# Patient Record
Sex: FEMALE | Race: WHITE | Employment: OTHER | URBAN - METROPOLITAN AREA
[De-identification: names, ages, dates, MRNs, and addresses within clinical notes are randomized per-mention and may not be internally consistent; named-entity substitution may affect disease eponyms.]

---

## 2017-01-13 ENCOUNTER — OFFICE VISIT (OUTPATIENT)
Dept: INTERNAL MEDICINE CLINIC | Age: 52
End: 2017-01-13

## 2017-01-13 VITALS
DIASTOLIC BLOOD PRESSURE: 80 MMHG | HEART RATE: 75 BPM | WEIGHT: 212.4 LBS | SYSTOLIC BLOOD PRESSURE: 122 MMHG | OXYGEN SATURATION: 98 % | BODY MASS INDEX: 33.27 KG/M2

## 2017-01-13 DIAGNOSIS — Z72.0 TOBACCO ABUSE: ICD-10-CM

## 2017-01-13 DIAGNOSIS — Z00.00 ROUTINE GENERAL MEDICAL EXAMINATION AT A HEALTH CARE FACILITY: Primary | ICD-10-CM

## 2017-01-13 LAB
ANION GAP SERPL CALCULATED.3IONS-SCNC: 11 MMOL/L (ref 3–16)
BASOPHILS ABSOLUTE: 0 K/UL (ref 0–0.2)
BASOPHILS RELATIVE PERCENT: 0.6 %
BUN BLDV-MCNC: 18 MG/DL (ref 7–20)
CALCIUM SERPL-MCNC: 9.2 MG/DL (ref 8.3–10.6)
CHLORIDE BLD-SCNC: 104 MMOL/L (ref 99–110)
CHOLESTEROL, TOTAL: 213 MG/DL (ref 0–199)
CO2: 27 MMOL/L (ref 21–32)
CREAT SERPL-MCNC: 0.7 MG/DL (ref 0.6–1.1)
EOSINOPHILS ABSOLUTE: 0.2 K/UL (ref 0–0.6)
EOSINOPHILS RELATIVE PERCENT: 2.6 %
GFR AFRICAN AMERICAN: >60
GFR NON-AFRICAN AMERICAN: >60
GLUCOSE BLD-MCNC: 99 MG/DL (ref 70–99)
HCT VFR BLD CALC: 46.4 % (ref 36–48)
HDLC SERPL-MCNC: 46 MG/DL (ref 40–60)
HEMOGLOBIN: 15.5 G/DL (ref 12–16)
LDL CHOLESTEROL CALCULATED: 134 MG/DL
LYMPHOCYTES ABSOLUTE: 2 K/UL (ref 1–5.1)
LYMPHOCYTES RELATIVE PERCENT: 31.3 %
MCH RBC QN AUTO: 31.7 PG (ref 26–34)
MCHC RBC AUTO-ENTMCNC: 33.5 G/DL (ref 31–36)
MCV RBC AUTO: 94.6 FL (ref 80–100)
MONOCYTES ABSOLUTE: 0.4 K/UL (ref 0–1.3)
MONOCYTES RELATIVE PERCENT: 6.6 %
NEUTROPHILS ABSOLUTE: 3.8 K/UL (ref 1.7–7.7)
NEUTROPHILS RELATIVE PERCENT: 58.9 %
PDW BLD-RTO: 13.3 % (ref 12.4–15.4)
PLATELET # BLD: 282 K/UL (ref 135–450)
PMV BLD AUTO: 9.1 FL (ref 5–10.5)
POTASSIUM SERPL-SCNC: 4.4 MMOL/L (ref 3.5–5.1)
RBC # BLD: 4.91 M/UL (ref 4–5.2)
SODIUM BLD-SCNC: 142 MMOL/L (ref 136–145)
TRIGL SERPL-MCNC: 166 MG/DL (ref 0–150)
TSH REFLEX: 0.63 UIU/ML (ref 0.27–4.2)
VITAMIN D 25-HYDROXY: 31.3 NG/ML
VLDLC SERPL CALC-MCNC: 33 MG/DL
WBC # BLD: 6.5 K/UL (ref 4–11)

## 2017-01-13 PROCEDURE — 99386 PREV VISIT NEW AGE 40-64: CPT | Performed by: NURSE PRACTITIONER

## 2017-01-13 RX ORDER — ESTRADIOL 0.1 MG/D
1 FILM, EXTENDED RELEASE TRANSDERMAL
COMMUNITY
End: 2018-10-11

## 2017-01-13 RX ORDER — AMOXICILLIN AND CLAVULANATE POTASSIUM 875; 125 MG/1; MG/1
1 TABLET, FILM COATED ORAL 2 TIMES DAILY
COMMUNITY
End: 2018-10-11

## 2017-01-13 RX ORDER — BENZONATATE 200 MG/1
200 CAPSULE ORAL 3 TIMES DAILY PRN
COMMUNITY
End: 2018-10-11

## 2017-01-13 RX ORDER — VARENICLINE TARTRATE 25 MG
KIT ORAL
Qty: 1 EACH | Refills: 0 | Status: SHIPPED | OUTPATIENT
Start: 2017-01-13 | End: 2018-10-11

## 2018-01-30 ENCOUNTER — HOSPITAL ENCOUNTER (OUTPATIENT)
Dept: ULTRASOUND IMAGING | Age: 53
Discharge: HOME | End: 2018-01-30
Attending: INTERNAL MEDICINE | Admitting: INTERNAL MEDICINE

## 2018-01-30 ENCOUNTER — OFFICE VISIT (OUTPATIENT)
Dept: INTERNAL MEDICINE CLINIC | Age: 53
End: 2018-01-30

## 2018-01-30 VITALS
TEMPERATURE: 98.3 F | WEIGHT: 215.6 LBS | HEART RATE: 72 BPM | BODY MASS INDEX: 33.77 KG/M2 | DIASTOLIC BLOOD PRESSURE: 76 MMHG | SYSTOLIC BLOOD PRESSURE: 138 MMHG

## 2018-01-30 DIAGNOSIS — M54.9 DORSALGIA: ICD-10-CM

## 2018-01-30 DIAGNOSIS — M54.9 OTHER ACUTE BACK PAIN: ICD-10-CM

## 2018-01-30 DIAGNOSIS — M54.9 OTHER ACUTE BACK PAIN: Primary | ICD-10-CM

## 2018-01-30 LAB
BILIRUBIN URINE: NEGATIVE
BILIRUBIN, POC: ABNORMAL
BLOOD URINE, POC: ABNORMAL
BLOOD, URINE: NEGATIVE
CLARITY, POC: CLEAR
CLARITY: CLEAR
COLOR, POC: YELLOW
COLOR: YELLOW
EPITHELIAL CELLS, UA: 6 /HPF (ref 0–5)
GLUCOSE URINE, POC: ABNORMAL
GLUCOSE URINE: NEGATIVE MG/DL
HYALINE CASTS: 5 /LPF (ref 0–8)
KETONES, POC: ABNORMAL
KETONES, URINE: NEGATIVE MG/DL
LEUKOCYTE EST, POC: ABNORMAL
LEUKOCYTE ESTERASE, URINE: NEGATIVE
MICROSCOPIC EXAMINATION: YES
NITRITE, POC: ABNORMAL
NITRITE, URINE: NEGATIVE
PH UA: 6
PH, POC: 6
PROTEIN UA: ABNORMAL MG/DL
PROTEIN, POC: ABNORMAL
RBC UA: 8 /HPF (ref 0–4)
SPECIFIC GRAVITY UA: >1.03
SPECIFIC GRAVITY, POC: >1.03
UROBILINOGEN, POC: 0.2
UROBILINOGEN, URINE: 0.2 E.U./DL
WBC UA: 1 /HPF (ref 0–5)

## 2018-01-30 PROCEDURE — G8419 CALC BMI OUT NRM PARAM NOF/U: HCPCS | Performed by: INTERNAL MEDICINE

## 2018-01-30 PROCEDURE — 3017F COLORECTAL CA SCREEN DOC REV: CPT | Performed by: INTERNAL MEDICINE

## 2018-01-30 PROCEDURE — 81001 URINALYSIS AUTO W/SCOPE: CPT | Performed by: INTERNAL MEDICINE

## 2018-01-30 PROCEDURE — G8482 FLU IMMUNIZE ORDER/ADMIN: HCPCS | Performed by: INTERNAL MEDICINE

## 2018-01-30 PROCEDURE — 3014F SCREEN MAMMO DOC REV: CPT | Performed by: INTERNAL MEDICINE

## 2018-01-30 PROCEDURE — 99213 OFFICE O/P EST LOW 20 MIN: CPT | Performed by: INTERNAL MEDICINE

## 2018-01-30 PROCEDURE — G8427 DOCREV CUR MEDS BY ELIG CLIN: HCPCS | Performed by: INTERNAL MEDICINE

## 2018-01-30 PROCEDURE — 4004F PT TOBACCO SCREEN RCVD TLK: CPT | Performed by: INTERNAL MEDICINE

## 2018-01-30 NOTE — PROGRESS NOTES
 Not on file     Social History Narrative    No narrative on file       Assessment/Plan:    1. Other acute back pain  - XR LUMBAR SPINE (2-3 VIEWS); Future  - URINALYSIS WITH MICROSCOPIC  UA POCT showed microscopic hematuria - rule out any urinary issues -US Renal Complete; Future  Continue pain medication PRN.

## 2018-01-31 ENCOUNTER — TELEPHONE (OUTPATIENT)
Dept: INTERNAL MEDICINE CLINIC | Age: 53
End: 2018-01-31

## 2018-01-31 DIAGNOSIS — R31.9 HEMATURIA, UNSPECIFIED TYPE: Primary | ICD-10-CM

## 2018-10-11 ENCOUNTER — OFFICE VISIT (OUTPATIENT)
Dept: PRIMARY CARE CLINIC | Age: 53
End: 2018-10-11

## 2018-10-11 VITALS
TEMPERATURE: 98.2 F | HEART RATE: 115 BPM | WEIGHT: 197 LBS | HEIGHT: 67 IN | SYSTOLIC BLOOD PRESSURE: 138 MMHG | BODY MASS INDEX: 30.92 KG/M2 | DIASTOLIC BLOOD PRESSURE: 70 MMHG

## 2018-10-11 DIAGNOSIS — T50.905A ADVERSE EFFECT OF DRUG, INITIAL ENCOUNTER: ICD-10-CM

## 2018-10-11 DIAGNOSIS — R00.2 PALPITATIONS: Primary | ICD-10-CM

## 2018-10-11 PROCEDURE — 99213 OFFICE O/P EST LOW 20 MIN: CPT | Performed by: INTERNAL MEDICINE

## 2018-10-11 PROCEDURE — 93000 ELECTROCARDIOGRAM COMPLETE: CPT | Performed by: INTERNAL MEDICINE

## 2018-10-11 RX ORDER — PHENTERMINE HYDROCHLORIDE 37.5 MG/1
37.5 CAPSULE ORAL 2 TIMES DAILY
COMMUNITY
End: 2019-04-25 | Stop reason: ALTCHOICE

## 2018-10-11 ASSESSMENT — ENCOUNTER SYMPTOMS
SHORTNESS OF BREATH: 1
WHEEZING: 0

## 2019-04-22 ENCOUNTER — TELEPHONE (OUTPATIENT)
Dept: PRIMARY CARE CLINIC | Age: 54
End: 2019-04-22

## 2019-04-22 NOTE — TELEPHONE ENCOUNTER
Pt is experiencing issues poss with her thyroids. Per pt stated that she is shaking all over. Pt was requesting an appt asap but scheduled the pt with the  first availability.             Thanks

## 2019-04-22 NOTE — TELEPHONE ENCOUNTER
She can be seen when there is availability. If symptoms are so severe, she can not wait until appt then I would suggest ER/urgent care.  At the very least prior to her appt, she can come in for LAB visit only for tsh w/ reflex

## 2019-04-25 ENCOUNTER — OFFICE VISIT (OUTPATIENT)
Dept: PRIMARY CARE CLINIC | Age: 54
End: 2019-04-25
Payer: COMMERCIAL

## 2019-04-25 VITALS
SYSTOLIC BLOOD PRESSURE: 130 MMHG | WEIGHT: 180.6 LBS | TEMPERATURE: 98.2 F | DIASTOLIC BLOOD PRESSURE: 60 MMHG | BODY MASS INDEX: 30.09 KG/M2 | HEART RATE: 88 BPM | HEIGHT: 65 IN

## 2019-04-25 DIAGNOSIS — L65.9 HAIR LOSS: ICD-10-CM

## 2019-04-25 DIAGNOSIS — R53.83 FATIGUE, UNSPECIFIED TYPE: Primary | ICD-10-CM

## 2019-04-25 DIAGNOSIS — E04.9 GOITER: ICD-10-CM

## 2019-04-25 LAB
ANION GAP SERPL CALCULATED.3IONS-SCNC: 12 MMOL/L (ref 3–16)
BUN BLDV-MCNC: 20 MG/DL (ref 7–20)
CALCIUM SERPL-MCNC: 9.4 MG/DL (ref 8.3–10.6)
CHLORIDE BLD-SCNC: 109 MMOL/L (ref 99–110)
CO2: 21 MMOL/L (ref 21–32)
CREAT SERPL-MCNC: <0.5 MG/DL (ref 0.6–1.1)
GFR AFRICAN AMERICAN: >60
GFR NON-AFRICAN AMERICAN: >60
GLUCOSE BLD-MCNC: 151 MG/DL (ref 70–99)
POTASSIUM SERPL-SCNC: 4.3 MMOL/L (ref 3.5–5.1)
SODIUM BLD-SCNC: 142 MMOL/L (ref 136–145)
T4 FREE: 5.7 NG/DL (ref 0.9–1.8)
TSH REFLEX: <0.01 UIU/ML (ref 0.27–4.2)

## 2019-04-25 PROCEDURE — 36415 COLL VENOUS BLD VENIPUNCTURE: CPT | Performed by: NURSE PRACTITIONER

## 2019-04-25 PROCEDURE — 3017F COLORECTAL CA SCREEN DOC REV: CPT | Performed by: NURSE PRACTITIONER

## 2019-04-25 PROCEDURE — 99213 OFFICE O/P EST LOW 20 MIN: CPT | Performed by: NURSE PRACTITIONER

## 2019-04-25 PROCEDURE — G8417 CALC BMI ABV UP PARAM F/U: HCPCS | Performed by: NURSE PRACTITIONER

## 2019-04-25 PROCEDURE — G8427 DOCREV CUR MEDS BY ELIG CLIN: HCPCS | Performed by: NURSE PRACTITIONER

## 2019-04-25 PROCEDURE — 1036F TOBACCO NON-USER: CPT | Performed by: NURSE PRACTITIONER

## 2019-04-25 ASSESSMENT — PATIENT HEALTH QUESTIONNAIRE - PHQ9
SUM OF ALL RESPONSES TO PHQ QUESTIONS 1-9: 1
1. LITTLE INTEREST OR PLEASURE IN DOING THINGS: 1
2. FEELING DOWN, DEPRESSED OR HOPELESS: 0
SUM OF ALL RESPONSES TO PHQ9 QUESTIONS 1 & 2: 1
SUM OF ALL RESPONSES TO PHQ QUESTIONS 1-9: 1

## 2019-04-25 NOTE — PROGRESS NOTES
Subjective:      Patient ID: Tiffany Baker is a 46 y.o. female. HPI 45 yo female presents for swollen lump to anterior neck. Has been there for a while, first noticed around dexter time. Cant sleep at night, heart feels like it is racing. Feels fine out the outside, but not inside. Reports tremors, cramps in finger, restlessness, diarrhea. Symptoms present > 1 month. Unable to sleep, fatigue, night sweats, feels shaky    No longer smoking, over 90 days. Went cold turkey    Patient past medical history, family history, social, and smoking reviewed and updated as pertinent. Health maintenance has been reviewed. Prior to Visit Medications    Not on File         Review of Systems   Constitutional: Positive for fatigue. Cardiovascular: Positive for palpitations. Endocrine:        Night sweats     Neurological: Positive for tremors. All other systems reviewed and are negative. Objective:   Physical Exam   Constitutional: She is oriented to person, place, and time. She appears well-developed and well-nourished. Neck: Thyromegaly present. Cardiovascular: Normal rate, regular rhythm and normal heart sounds. Pulmonary/Chest: Effort normal and breath sounds normal.   Neurological: She is alert and oriented to person, place, and time. Skin: Skin is warm and dry. Assessment:       Diagnosis Orders   1. Fatigue, unspecified type  TSH with Reflex    Basic Metabolic Panel   2. Hair loss  TSH with Reflex    Basic Metabolic Panel   3.  Goiter  US Head Neck Soft Tissue Thyroid    TSH with Reflex    Basic Metabolic Panel           Plan:      As above          Rosie Villa, APRN - CNP

## 2019-04-26 ENCOUNTER — HOSPITAL ENCOUNTER (OUTPATIENT)
Dept: ULTRASOUND IMAGING | Age: 54
Discharge: HOME | End: 2019-04-26
Payer: COMMERCIAL

## 2019-04-26 DIAGNOSIS — E04.9 GOITER: ICD-10-CM

## 2019-04-26 PROCEDURE — 76536 US EXAM OF HEAD AND NECK: CPT

## 2019-04-29 ENCOUNTER — TELEPHONE (OUTPATIENT)
Dept: PRIMARY CARE CLINIC | Age: 54
End: 2019-04-29

## 2019-04-29 DIAGNOSIS — E05.90 HYPERTHYROIDISM: Primary | ICD-10-CM

## 2019-04-29 NOTE — TELEPHONE ENCOUNTER
----- Message from MARY Travis CNP sent at 4/29/2019 10:14 AM EDT -----  Please let william know that her ultrasound confirms an enlarged thyroid. Her bloodwork is indicative of HYPERthyroidism.  I am going to refer her to endocrine for management    Please place referral to Dr. Manuel Danielle or partners    Dx: enlarged thyroid, hyperthyroidism

## 2019-05-21 PROBLEM — E66.9 CLASS 1 OBESITY WITH BODY MASS INDEX (BMI) OF 30.0 TO 30.9 IN ADULT: Status: ACTIVE | Noted: 2019-05-21

## 2019-05-21 PROBLEM — E05.90 HYPERTHYROIDISM: Status: ACTIVE | Noted: 2019-05-21

## 2019-05-21 PROBLEM — E04.1 THYROID NODULE: Status: ACTIVE | Noted: 2019-05-21

## 2019-05-21 PROBLEM — E04.9 GOITER: Status: ACTIVE | Noted: 2019-05-21

## 2019-05-21 PROBLEM — E66.811 CLASS 1 OBESITY WITH BODY MASS INDEX (BMI) OF 30.0 TO 30.9 IN ADULT: Status: ACTIVE | Noted: 2019-05-21

## 2019-05-22 ENCOUNTER — OFFICE VISIT (OUTPATIENT)
Dept: ENDOCRINOLOGY | Age: 54
End: 2019-05-22
Payer: COMMERCIAL

## 2019-05-22 VITALS
DIASTOLIC BLOOD PRESSURE: 67 MMHG | HEIGHT: 65 IN | OXYGEN SATURATION: 97 % | HEART RATE: 94 BPM | WEIGHT: 179.2 LBS | BODY MASS INDEX: 29.85 KG/M2 | SYSTOLIC BLOOD PRESSURE: 133 MMHG

## 2019-05-22 DIAGNOSIS — E66.3 OVERWEIGHT (BMI 25.0-29.9): ICD-10-CM

## 2019-05-22 DIAGNOSIS — E05.90 HYPERTHYROIDISM: ICD-10-CM

## 2019-05-22 DIAGNOSIS — E04.9 GOITER: ICD-10-CM

## 2019-05-22 DIAGNOSIS — E04.1 THYROID NODULE: ICD-10-CM

## 2019-05-22 PROCEDURE — G8417 CALC BMI ABV UP PARAM F/U: HCPCS | Performed by: INTERNAL MEDICINE

## 2019-05-22 PROCEDURE — 4004F PT TOBACCO SCREEN RCVD TLK: CPT | Performed by: INTERNAL MEDICINE

## 2019-05-22 PROCEDURE — G8427 DOCREV CUR MEDS BY ELIG CLIN: HCPCS | Performed by: INTERNAL MEDICINE

## 2019-05-22 PROCEDURE — 3017F COLORECTAL CA SCREEN DOC REV: CPT | Performed by: INTERNAL MEDICINE

## 2019-05-22 PROCEDURE — 99204 OFFICE O/P NEW MOD 45 MIN: CPT | Performed by: INTERNAL MEDICINE

## 2019-05-22 RX ORDER — CHOLECALCIFEROL (VITAMIN D3) 1250 MCG
CAPSULE ORAL
COMMUNITY
End: 2019-11-14

## 2019-05-22 RX ORDER — ATENOLOL 25 MG/1
25 TABLET ORAL DAILY
Qty: 60 TABLET | Refills: 3 | Status: SHIPPED | OUTPATIENT
Start: 2019-05-22 | End: 2019-06-21 | Stop reason: SDUPTHER

## 2019-05-22 NOTE — PROGRESS NOTES
SUBJECTIVE:  Josefa Mcardle is a 46 y.o. female who is here for hyperthyroidism. 1. Hyperthyroidism    This started in 2019. Patient was diagnosed with hyperthyroidism. The problem has been gradually worsening. Previous thyroid studies include: TSH and free thyroxine. Patient started medication in N/A. Currently patient is on: N/A. Misses  N/A doses a month. 2. Goiter    Current complaints: palpitations, weight loss, diarrhea, heat intolerance, goiter, hair thinning, sweating, insomnia, decreased concentration, anxiety, tremor. History of obstructive symptoms: difficulty swallowing Yes, changes in voice/hoarseness Yes. History of radiation to patient's neck: No  Resent iodine exposure: No  Family history includes unknown  Family history of thyroid cancer: unknown    Noticed right neck swelling 1 year ago, increased in size. Feels like sore throat. 3. Thyroid nodule  Small, 6 mm. 4. Overweight  Lost 30 lbs over 5 months on diet, exercise. THYROID ULTRASOUND       CLINICAL HISTORY: Goiter. Thyromegaly.        FINDINGS:  Ultrasound imaging of the thyroid gland was performed. The right left lobes of the thyroid gland are diffusely enlarged. The right lobe of the thyroid gland measures 6.1 x 2.8 x 2.4 cm. Left lobe of the thyroid gland measures 5.4 x 2.2 x 2.1    cm in size. Echotexture of the gland is mildly heterogeneous with some areas of lobulation. There is a small mixed echogenic nodule identified medially within the inferior pole of the right lobe of the thyroid measuring 0.6 x 0.5 x 0.4 cm.       There is increased Doppler flow identified within the thyroid gland.           Impression           1. Diffusely enlarged thyroid gland with hyperemia on Doppler flow. Thyromegaly is nonspecific but can be seen with Graves' disease, or Hashimoto's thyroiditis. Correlate with thyroid function tests.    2. Small thyroid nodule is identified at the inferior pole the right lobe of the thyroid gland. Recommend sonographic follow-up in one year to confirm stability.       Order History         History reviewed. No pertinent past medical history.   Patient Active Problem List    Diagnosis Date Noted    Hyperthyroidism 2019    Goiter 2019    Thyroid nodule 2019    Overweight (BMI 25.0-29.9) 2019    Acute back pain 2018     Past Surgical History:   Procedure Laterality Date    CHOLECYSTECTOMY      HYSTERECTOMY      TUBAL LIGATION       Family History   Adopted: Yes     Social History     Socioeconomic History    Marital status:      Spouse name: None    Number of children: None    Years of education: None    Highest education level: None   Occupational History    None   Social Needs    Financial resource strain: None    Food insecurity:     Worry: None     Inability: None    Transportation needs:     Medical: None     Non-medical: None   Tobacco Use    Smoking status: Current Every Day Smoker     Packs/day: 0.25     Years: 25.00     Pack years: 6.25     Types: Cigarettes     Last attempt to quit: 2019     Years since quittin.3    Smokeless tobacco: Never Used   Substance and Sexual Activity    Alcohol use: Yes     Comment: socially    Drug use: No    Sexual activity: Yes     Partners: Male   Lifestyle    Physical activity:     Days per week: None     Minutes per session: None    Stress: None   Relationships    Social connections:     Talks on phone: None     Gets together: None     Attends Gnosticist service: None     Active member of club or organization: None     Attends meetings of clubs or organizations: None     Relationship status: None    Intimate partner violence:     Fear of current or ex partner: None     Emotionally abused: None     Physically abused: None     Forced sexual activity: None   Other Topics Concern    None   Social History Narrative    None     Current Outpatient Medications   Medication Sig Dispense Refill    Cholecalciferol (VITAMIN D3) 30976 units CAPS Take by mouth      UNABLE TO FIND Hormone Pellets      atenolol (TENORMIN) 25 MG tablet Take 1 tablet by mouth daily 60 tablet 3     No current facility-administered medications for this visit. No Known Allergies  No family status information on file.        Review of Systems:  Constitutional: has fatigue, no fever, no recent weight gain, has recent weight loss, no changes in appetite  Eyes: no eye pain, no change in vision, no eye redness, no eye irritation, no double vision  Ears, nose, throat: no nasal congestion, no sore throat, no earache, no decrease in hearing, hsa hoarseness, no dry mouth, no sinus problems, has difficulty swallowing, no neck lumps, no dental problems, no mouth sores, no ringing in ears  Pulmonary: has shortness of breath, no wheezing, no dyspnea on exertion, has cough  Cardiovascular: has chest pain, has lower extremity edema, no orthopnea, no intermittent leg claudication, has palpitations  Gastrointestinal: no abdominal pain, no nausea, no vomiting, has diarrhea, no constipation, no dysphagia, no heartburn, no bloating  Genitourinary: no dysuria, no urinary incontinence, no urinary hesitancy, no urinary frequency, no feelings of urinary urgency, has nocturia  Musculoskeletal: no joint swelling, no joint stiffness, no joint pain, no muscle cramps, has muscle pain, no bone pain  Integument/Breast: no hair loss, no skin rashes, no skin lesions, no itching, no dry skin  Neurological: no numbness, no tingling, no weakness, no confusion, has headaches, has dizziness, no fainting, no tremors, no decrease in memory, no balance problems  Psychiatric: has anxiety, no depression, no insomnia  Hematologic/Lymphatic: no tendency for easy bleeding, no swollen lymph nodes, no tendency for easy bruising  Immunology: no seasonal allergies, no frequent infections, no frequent illnesses  Endocrine: no temperature intolerance, has hand tremor    /67 CO2 21 04/25/2019    BUN 20 04/25/2019    CREATININE <0.5 04/25/2019    GLUCOSE 151 04/25/2019    CALCIUM 9.4 04/25/2019    PROT 7.1 02/21/2018    LABALBU 3.8 02/21/2018    BILITOT <0.2 02/21/2018    ALKPHOS 89 02/21/2018    AST 17 02/21/2018    ALT 19 02/21/2018    LABGLOM >60 04/25/2019    GFRAA >60 04/25/2019     No results found for: TSHFT4, TSH, FT3  No results found for: LABA1C  No results found for: EAG  Lab Results   Component Value Date    CHOL 213 01/13/2017     Lab Results   Component Value Date    TRIG 166 01/13/2017     Lab Results   Component Value Date    HDL 46 01/13/2017     Lab Results   Component Value Date    LDLCALC 134 01/13/2017     Lab Results   Component Value Date    LABVLDL 33 01/13/2017     No results found for: [facility]  Lab Results   Component Value Date    LABMICR Yes 02/21/2018     Lab Results   Component Value Date    VITD25 31.3 01/13/2017        ASSESSMENT/PLAN:  1. Hyperthyroidism  - Thyroid uptake and scan  - atenolol (TENORMIN) 25 MG tablet; Take 1 tablet by mouth daily  Dispense: 60 tablet; Refill: 3  - T3; Future  - T3, Free; Future  - T4; Future  - T4, Free; Future  - CBC Auto Differential; Future  - Comprehensive Metabolic Panel; Future  - Thyrotropin receptor antibody; Future  - Thyroid Stimulating Immunoglobulin; Future  - Thyroid Peroxidase Antibody; Future  - TSH without Reflex; Future  - Anti-Thyroglobulin Antibody; Future    2. Goiter    - T3; Future  - T3, Free; Future  - T4; Future  - T4, Free; Future  - CBC Auto Differential; Future  - Comprehensive Metabolic Panel; Future  - Thyrotropin receptor antibody; Future  - Thyroid Stimulating Immunoglobulin; Future  - Thyroid Peroxidase Antibody; Future  - TSH without Reflex; Future  - Anti-Thyroglobulin Antibody; Future    3. Thyroid nodule  Thyroid sono follow up. 4. Overweight (BMI 25.0-29. 9)  Diet, exercise.     Reviewed and/or ordered clinical lab results Yes  Reviewed and/or ordered radiology tests Yes Reviewed and/or ordered other diagnostic tests No  Discussed test results with performing physician No  Independently reviewed image, tracing, or specimen No  Made a decision to obtain old records No  Reviewed and summarized old records Yes  TSH less than 0.01  FT4 5.7  TSH 0.63 in 2017  Obtained history from other than patient No    Masha Pichardo was counseled regarding symptoms of thyroid diagnosis, course and complications of disease if inadequately treated, side effects of medications, diagnosis, treatment options, and prognosis, risks, benefits, complications, and alternatives of treatment, labs, imaging and other studies and treatment targets and goals, I-131 Tx, methimazole, side effects, remission, permanent hypothyroidism. She understands instructions and counseling. Return in about 2 weeks (around 6/5/2019) for thyroid problems.

## 2019-05-23 ENCOUNTER — TELEPHONE (OUTPATIENT)
Dept: ENDOCRINOLOGY | Age: 54
End: 2019-05-23

## 2019-05-23 DIAGNOSIS — E05.90 HYPERTHYROIDISM: Primary | ICD-10-CM

## 2019-05-30 ENCOUNTER — HOSPITAL ENCOUNTER (OUTPATIENT)
Dept: NUCLEAR MEDICINE | Age: 54
Discharge: HOME | End: 2019-05-30
Payer: COMMERCIAL

## 2019-05-30 DIAGNOSIS — E05.90 HYPERTHYROIDISM: ICD-10-CM

## 2019-05-30 PROCEDURE — 78014 THYROID IMAGING W/BLOOD FLOW: CPT

## 2019-05-30 PROCEDURE — A9516 IODINE I-123 SOD IODIDE MIC: HCPCS | Performed by: INTERNAL MEDICINE

## 2019-05-30 PROCEDURE — 3430000000 HC RX DIAGNOSTIC RADIOPHARMACEUTICAL: Performed by: INTERNAL MEDICINE

## 2019-05-30 RX ADMIN — SODIUM IODIDE I 123 200 MICRO CURIE: 100 CAPSULE, GELATIN COATED ORAL at 15:09

## 2019-05-31 ENCOUNTER — HOSPITAL ENCOUNTER (OUTPATIENT)
Dept: NUCLEAR MEDICINE | Age: 54
Discharge: HOME | End: 2019-05-31
Payer: COMMERCIAL

## 2019-05-31 DIAGNOSIS — E05.90 HYPERTHYROIDISM: ICD-10-CM

## 2019-05-31 PROCEDURE — 78014 THYROID IMAGING W/BLOOD FLOW: CPT

## 2019-06-12 ENCOUNTER — OFFICE VISIT (OUTPATIENT)
Dept: ENDOCRINOLOGY | Age: 54
End: 2019-06-12
Payer: COMMERCIAL

## 2019-06-12 VITALS
OXYGEN SATURATION: 98 % | WEIGHT: 175.4 LBS | BODY MASS INDEX: 29.22 KG/M2 | DIASTOLIC BLOOD PRESSURE: 79 MMHG | SYSTOLIC BLOOD PRESSURE: 133 MMHG | HEIGHT: 65 IN | HEART RATE: 92 BPM

## 2019-06-12 DIAGNOSIS — E04.1 THYROID NODULE: ICD-10-CM

## 2019-06-12 DIAGNOSIS — E66.3 OVERWEIGHT (BMI 25.0-29.9): ICD-10-CM

## 2019-06-12 DIAGNOSIS — Z78.0 MENOPAUSE: ICD-10-CM

## 2019-06-12 DIAGNOSIS — E05.90 HYPERTHYROIDISM: Primary | ICD-10-CM

## 2019-06-12 DIAGNOSIS — E05.90 HYPERTHYROIDISM: ICD-10-CM

## 2019-06-12 DIAGNOSIS — E04.9 GOITER: ICD-10-CM

## 2019-06-12 LAB
A/G RATIO: 1.6 (ref 1.1–2.2)
ALBUMIN SERPL-MCNC: 3.9 G/DL (ref 3.4–5)
ALP BLD-CCNC: 139 U/L (ref 40–129)
ALT SERPL-CCNC: 29 U/L (ref 10–40)
ANION GAP SERPL CALCULATED.3IONS-SCNC: 12 MMOL/L (ref 3–16)
ANTI-THYROGLOB ABS: 211 IU/ML
AST SERPL-CCNC: 19 U/L (ref 15–37)
BILIRUB SERPL-MCNC: 0.3 MG/DL (ref 0–1)
BUN BLDV-MCNC: 19 MG/DL (ref 7–20)
CALCIUM SERPL-MCNC: 9.6 MG/DL (ref 8.3–10.6)
CHLORIDE BLD-SCNC: 106 MMOL/L (ref 99–110)
CO2: 24 MMOL/L (ref 21–32)
CREAT SERPL-MCNC: <0.5 MG/DL (ref 0.6–1.1)
GFR AFRICAN AMERICAN: >60
GFR NON-AFRICAN AMERICAN: >60
GLOBULIN: 2.5 G/DL
GLUCOSE BLD-MCNC: 104 MG/DL (ref 70–99)
POTASSIUM SERPL-SCNC: 5.1 MMOL/L (ref 3.5–5.1)
SODIUM BLD-SCNC: 142 MMOL/L (ref 136–145)
T3 FREE: 26 PG/ML (ref 2.3–4.2)
T3 TOTAL: 6 NG/ML (ref 0.8–2)
T4 FREE: 6.5 NG/DL (ref 0.9–1.8)
T4 TOTAL: 16.4 UG/DL (ref 4.5–10.9)
THYROID PEROXIDASE (TPO) ABS: 124 IU/ML
TOTAL PROTEIN: 6.4 G/DL (ref 6.4–8.2)

## 2019-06-12 PROCEDURE — 1036F TOBACCO NON-USER: CPT | Performed by: INTERNAL MEDICINE

## 2019-06-12 PROCEDURE — 3017F COLORECTAL CA SCREEN DOC REV: CPT | Performed by: INTERNAL MEDICINE

## 2019-06-12 PROCEDURE — G8417 CALC BMI ABV UP PARAM F/U: HCPCS | Performed by: INTERNAL MEDICINE

## 2019-06-12 PROCEDURE — 99214 OFFICE O/P EST MOD 30 MIN: CPT | Performed by: INTERNAL MEDICINE

## 2019-06-12 PROCEDURE — G8427 DOCREV CUR MEDS BY ELIG CLIN: HCPCS | Performed by: INTERNAL MEDICINE

## 2019-06-12 NOTE — PROGRESS NOTES
SUBJECTIVE:  Lissa Betancourt is a 46 y.o. female who is here for hyperthyroidism. 1. Hyperthyroidism    This started in 2019. Patient was diagnosed with hyperthyroidism. The problem has been gradually worsening. Previous thyroid studies include: TSH and free thyroxine. Patient started medication in N/A. Currently patient is on: N/A. Misses  N/A doses a month. 2. Goiter    Current complaints: palpitations, weight loss, diarrhea, heat intolerance, goiter, hair thinning, sweating, insomnia, decreased concentration, anxiety, tremor. Insomnia is better. History of obstructive symptoms: difficulty swallowing Yes, changes in voice/hoarseness Yes. History of radiation to patient's neck: No  Resent iodine exposure: No  Family history includes unknown  Family history of thyroid cancer: unknown    Noticed right neck swelling 1 year ago, increased in size. Feels like sore throat. 3. Thyroid nodule  Small, 6 mm. 4. Overweight  Lost 30 lbs over 5 months on diet, exercise. Thyroid uptake and scan.       HISTORY: Hyperthyroidism       COMPARISON: Thyroid ultrasound April 26, 2019.       228.4 uCi of I-123 was administered by mouth followed by thyroid uptake and scan       Homogeneous activity within the right and left lobes of the thyroid without hot or cold nodule. The right lobe is larger than the left incidental.       The 6 mm nodule noted inferior pole right lobe on previous ultrasound examination is beyond the spatial resolution of the thyroid scan.       Uptake at 24 hours is 73% which is markedly elevated with normal between 10 and 35%.         Impression       Markedly increased uptake at 73.6% indeterminate though likely relates to diffuse toxic goiter/Graves' disease         THYROID ULTRASOUND       CLINICAL HISTORY: Goiter. Thyromegaly.        FINDINGS:  Ultrasound imaging of the thyroid gland was performed. The right left lobes of the thyroid gland are diffusely enlarged.  The right lobe of the thyroid gland measures 6.1 x 2.8 x 2.4 cm. Left lobe of the thyroid gland measures 5.4 x 2.2 x 2.1    cm in size. Echotexture of the gland is mildly heterogeneous with some areas of lobulation. There is a small mixed echogenic nodule identified medially within the inferior pole of the right lobe of the thyroid measuring 0.6 x 0.5 x 0.4 cm.       There is increased Doppler flow identified within the thyroid gland.           Impression           1. Diffusely enlarged thyroid gland with hyperemia on Doppler flow. Thyromegaly is nonspecific but can be seen with Graves' disease, or Hashimoto's thyroiditis. Correlate with thyroid function tests. 2. Small thyroid nodule is identified at the inferior pole the right lobe of the thyroid gland. Recommend sonographic follow-up in one year to confirm stability.       Order History         History reviewed. No pertinent past medical history. Patient Active Problem List    Diagnosis Date Noted    Hyperthyroidism 2019    Goiter 2019    Thyroid nodule 2019    Overweight (BMI 25.0-29.9) 2019    Acute back pain 2018     Past Surgical History:   Procedure Laterality Date    CHOLECYSTECTOMY      HYSTERECTOMY      TUBAL LIGATION       Family History   Adopted: Yes     Social History     Socioeconomic History    Marital status:      Spouse name: None    Number of children: None    Years of education: None    Highest education level: None   Occupational History    None   Social Needs    Financial resource strain: None    Food insecurity:     Worry: None     Inability: None    Transportation needs:     Medical: None     Non-medical: None   Tobacco Use    Smoking status: Former Smoker     Packs/day: 0.25     Years: 25.00     Pack years: 6.25     Types: Cigarettes     Last attempt to quit: 2019     Years since quittin.3    Smokeless tobacco: Never Used   Substance and Sexual Activity    Alcohol use:  Yes Comment: socially    Drug use: No    Sexual activity: Yes     Partners: Male   Lifestyle    Physical activity:     Days per week: None     Minutes per session: None    Stress: None   Relationships    Social connections:     Talks on phone: None     Gets together: None     Attends Congregational service: None     Active member of club or organization: None     Attends meetings of clubs or organizations: None     Relationship status: None    Intimate partner violence:     Fear of current or ex partner: None     Emotionally abused: None     Physically abused: None     Forced sexual activity: None   Other Topics Concern    None   Social History Narrative    None     Current Outpatient Medications   Medication Sig Dispense Refill    Cholecalciferol (VITAMIN D3) 65570 units CAPS Take by mouth      UNABLE TO FIND Hormone Pellets      atenolol (TENORMIN) 25 MG tablet Take 1 tablet by mouth daily 60 tablet 3     No current facility-administered medications for this visit. No Known Allergies  No family status information on file.        Review of Systems:  Constitutional: has fatigue, no fever, no recent weight gain, has recent weight loss, no changes in appetite  Eyes: no eye pain, no change in vision, no eye redness, no eye irritation, no double vision  Ears, nose, throat: no nasal congestion, no sore throat, no earache, no decrease in hearing, hsa hoarseness, no dry mouth, no sinus problems, has difficulty swallowing, no neck lumps, no dental problems, no mouth sores, no ringing in ears  Pulmonary: has shortness of breath, no wheezing, no dyspnea on exertion, has cough  Cardiovascular: has chest pain, has lower extremity edema, no orthopnea, no intermittent leg claudication, has palpitations  Gastrointestinal: no abdominal pain, no nausea, no vomiting, has diarrhea, no constipation, no dysphagia, no heartburn, no bloating  Genitourinary: no dysuria, no urinary incontinence, no urinary hesitancy, no urinary frequency, no feelings of urinary urgency, has nocturia  Musculoskeletal: no joint swelling, no joint stiffness, no joint pain, no muscle cramps, has muscle pain, no bone pain  Integument/Breast: no hair loss, no skin rashes, no skin lesions, no itching, no dry skin  Neurological: no numbness, no tingling, no weakness, no confusion, has headaches, has dizziness, no fainting, no tremors, no decrease in memory, no balance problems  Psychiatric: has anxiety, no depression, no insomnia  Hematologic/Lymphatic: no tendency for easy bleeding, no swollen lymph nodes, no tendency for easy bruising  Immunology: no seasonal allergies, no frequent infections, no frequent illnesses  Endocrine: no temperature intolerance, has hand tremor    /79 (Site: Left Upper Arm, Position: Sitting, Cuff Size: Medium Adult)   Pulse 92   Ht 5' 5\" (1.651 m)   Wt 175 lb 6.4 oz (79.6 kg)   SpO2 98%   BMI 29.19 kg/m²    Wt Readings from Last 3 Encounters:   06/12/19 175 lb 6.4 oz (79.6 kg)   05/22/19 179 lb 3.2 oz (81.3 kg)   04/25/19 180 lb 9.6 oz (81.9 kg)     Body mass index is 29.19 kg/m².     OBJECTIVE:  Constitutional: no acute distress, well appearing and well nourished  Psychiatric: oriented to person, place and time, judgement and insight and normal, recent and remote memory and intact and mood and affect are normal  Skin: skin and subcutaneous tissue is normal without mass, normal turgor  Head and Face: examination of head and face revealed no abnormalities  Eyes: no lid or conjunctival swelling, erythema or discharge, pupils are normal, equal, round, reactive to light  Ears/Nose: external inspection of ears and nose revealed no abnormalities, hearing is grossly normal  Oropharynx/Mouth/Face: lips, tongue and gums are normal with no lesions, the voice quality was normal  Neck: neck is supple and symmetric, with midline trachea and no masses, thyroid is enlarged, right lobe larger  Lymphatics: normal cervical lymph nodes, normal supraclavicular nodes  Pulmonary: no increased work of breathing or signs of respiratory distress, lungs are clear to auscultation  Cardiovascular: normal heart rate and rhythm, normal S1 and S2, no murmurs and pedal pulses and 2+ bilaterally, No edema  Abdomen: abdomen is soft, non-tender with no masses  Musculoskeletal: normal gait and station and exam of the digits and nails are normal  Neurological: normal coordination and normal general cortical function, has hand tremor      Lab Review:    Lab Results   Component Value Date    WBC 12.5 02/21/2018    HGB 15.1 02/21/2018    HCT 44.4 02/21/2018    MCV 93.5 02/21/2018     02/21/2018     Lab Results   Component Value Date     04/25/2019    K 4.3 04/25/2019     04/25/2019    CO2 21 04/25/2019    BUN 20 04/25/2019    CREATININE <0.5 04/25/2019    GLUCOSE 151 04/25/2019    CALCIUM 9.4 04/25/2019    PROT 7.1 02/21/2018    LABALBU 3.8 02/21/2018    BILITOT <0.2 02/21/2018    ALKPHOS 89 02/21/2018    AST 17 02/21/2018    ALT 19 02/21/2018    LABGLOM >60 04/25/2019    GFRAA >60 04/25/2019     No results found for: TSHFT4, TSH, FT3  No results found for: LABA1C  No results found for: EAG  Lab Results   Component Value Date    CHOL 213 01/13/2017     Lab Results   Component Value Date    TRIG 166 01/13/2017     Lab Results   Component Value Date    HDL 46 01/13/2017     Lab Results   Component Value Date    LDLCALC 134 01/13/2017     Lab Results   Component Value Date    LABVLDL 33 01/13/2017     No results found for: [facility]  Lab Results   Component Value Date    LABMICR Yes 02/21/2018     Lab Results   Component Value Date    VITD25 31.3 01/13/2017        ASSESSMENT/PLAN:  1. Hyperthyroidism  Call with results, start [address] if needed. I-131 Tx when stable. - DXA  - atenolol (TENORMIN) 25 MG tablet; Take 1 tablet by mouth daily  Dispense: 60 tablet;  Refill: 3  - T3; Future  - T3, Free; Future  - T4; Future  - T4, Free; Future  - CBC Auto Differential; Future  - Comprehensive Metabolic Panel; Future  - TSH without Reflex; Future    2. Goiter  Thyroid sono    3. Thyroid nodule  Thyroid sono follow up. 4. Overweight (BMI 25.0-29. 9)  Diet, exercise. Reviewed and/or ordered clinical lab results Yes  Reviewed and/or ordered radiology tests Yes   Reviewed and/or ordered other diagnostic tests No  Discussed test results with performing physician No  Independently reviewed image, tracing, or specimen No  Made a decision to obtain old records No  Reviewed and summarized old records Yes  TSH less than 0.01  FT4 5.7  TSH 0.63 in 2017  Obtained history from other than patient No    Yury Vásquez was counseled regarding symptoms of thyroid diagnosis, course and complications of disease if inadequately treated, side effects of medications, diagnosis, treatment options, and prognosis, risks, benefits, complications, and alternatives of treatment, labs, imaging and other studies and treatment targets and goals, I-131 Tx, methimazole, side effects, remission, permanent hypothyroidism. She understands instructions and counseling. Return in about 1 month (around 7/12/2019), or one and 2 months, for thyroid problems.

## 2019-06-14 LAB — TSH RECEPTOR AB: >40 IU/L

## 2019-06-17 LAB — THYROID STIMULATING IMMUNOGLOBULIN: 480 %

## 2019-06-21 ENCOUNTER — TELEPHONE (OUTPATIENT)
Dept: ENDOCRINOLOGY | Age: 54
End: 2019-06-21

## 2019-06-21 DIAGNOSIS — E05.90 HYPERTHYROIDISM: ICD-10-CM

## 2019-06-21 RX ORDER — METHIMAZOLE 10 MG/1
10 TABLET ORAL 3 TIMES DAILY
Qty: 90 TABLET | Refills: 3 | Status: SHIPPED | OUTPATIENT
Start: 2019-06-21 | End: 2019-10-30 | Stop reason: SINTOL

## 2019-06-21 RX ORDER — ATENOLOL 25 MG/1
25 TABLET ORAL DAILY
Qty: 60 TABLET | Refills: 3 | Status: SHIPPED | OUTPATIENT
Start: 2019-06-21 | End: 2019-11-13 | Stop reason: SDUPTHER

## 2019-06-21 NOTE — TELEPHONE ENCOUNTER
PT called states Dr. Chano Doherty left her a voicemail and she couldn't understand it, needs a call back to let her know what the doctor wants to tell her, [phone]

## 2019-06-21 NOTE — TELEPHONE ENCOUNTER
I spoke with patient, discussed side effects and benefits of methimazole. Informed how to take it. I sent prescription to pharmacy. I answered questions to patient satisfaction.

## 2019-06-21 NOTE — TELEPHONE ENCOUNTER
LOV: 6/12/19  NOV: 7/17/19    Medication: Atenolol 25 MG  Sig: Take 1 tablet by mouth daily  Route: Oral  Quantity: 60

## 2019-06-24 ENCOUNTER — TELEPHONE (OUTPATIENT)
Dept: ENDOCRINOLOGY | Age: 54
End: 2019-06-24

## 2019-06-24 NOTE — TELEPHONE ENCOUNTER
Spoke with patient and advised prescription was sent to [facility]. Patient stated that was fine.

## 2019-07-09 DIAGNOSIS — E05.90 HYPERTHYROIDISM: ICD-10-CM

## 2019-07-09 LAB
A/G RATIO: 1.8 (ref 1.1–2.2)
ALBUMIN SERPL-MCNC: 3.9 G/DL (ref 3.4–5)
ALP BLD-CCNC: 149 U/L (ref 40–129)
ALT SERPL-CCNC: 28 U/L (ref 10–40)
ANION GAP SERPL CALCULATED.3IONS-SCNC: 10 MMOL/L (ref 3–16)
AST SERPL-CCNC: 19 U/L (ref 15–37)
BASOPHILS ABSOLUTE: 0 K/UL (ref 0–0.2)
BASOPHILS RELATIVE PERCENT: 0.6 %
BILIRUB SERPL-MCNC: 0.5 MG/DL (ref 0–1)
BUN BLDV-MCNC: 15 MG/DL (ref 7–20)
CALCIUM SERPL-MCNC: 9.6 MG/DL (ref 8.3–10.6)
CHLORIDE BLD-SCNC: 107 MMOL/L (ref 99–110)
CO2: 24 MMOL/L (ref 21–32)
CREAT SERPL-MCNC: <0.5 MG/DL (ref 0.6–1.1)
EOSINOPHILS ABSOLUTE: 0.3 K/UL (ref 0–0.6)
EOSINOPHILS RELATIVE PERCENT: 6.2 %
GFR AFRICAN AMERICAN: >60
GFR NON-AFRICAN AMERICAN: >60
GLOBULIN: 2.2 G/DL
GLUCOSE BLD-MCNC: 117 MG/DL (ref 70–99)
HCT VFR BLD CALC: 40.8 % (ref 36–48)
HEMOGLOBIN: 13.7 G/DL (ref 12–16)
LYMPHOCYTES ABSOLUTE: 1.6 K/UL (ref 1–5.1)
LYMPHOCYTES RELATIVE PERCENT: 34.8 %
MCH RBC QN AUTO: 28.4 PG (ref 26–34)
MCHC RBC AUTO-ENTMCNC: 33.5 G/DL (ref 31–36)
MCV RBC AUTO: 84.6 FL (ref 80–100)
MONOCYTES ABSOLUTE: 0.5 K/UL (ref 0–1.3)
MONOCYTES RELATIVE PERCENT: 11.2 %
NEUTROPHILS ABSOLUTE: 2.2 K/UL (ref 1.7–7.7)
NEUTROPHILS RELATIVE PERCENT: 47.2 %
PDW BLD-RTO: 13.6 % (ref 12.4–15.4)
PLATELET # BLD: 251 K/UL (ref 135–450)
PMV BLD AUTO: 9.6 FL (ref 5–10.5)
POTASSIUM SERPL-SCNC: 4.9 MMOL/L (ref 3.5–5.1)
RBC # BLD: 4.82 M/UL (ref 4–5.2)
SODIUM BLD-SCNC: 141 MMOL/L (ref 136–145)
T3 FREE: 8.4 PG/ML (ref 2.3–4.2)
T3 TOTAL: 2.48 NG/ML (ref 0.8–2)
T4 FREE: 2.4 NG/DL (ref 0.9–1.8)
T4 TOTAL: 9.9 UG/DL (ref 4.5–10.9)
TOTAL PROTEIN: 6.1 G/DL (ref 6.4–8.2)
TSH SERPL DL<=0.05 MIU/L-ACNC: <0.01 UIU/ML (ref 0.27–4.2)
WBC # BLD: 4.6 K/UL (ref 4–11)

## 2019-07-17 ENCOUNTER — OFFICE VISIT (OUTPATIENT)
Dept: ENDOCRINOLOGY | Age: 54
End: 2019-07-17
Payer: COMMERCIAL

## 2019-07-17 VITALS
BODY MASS INDEX: 29.06 KG/M2 | DIASTOLIC BLOOD PRESSURE: 76 MMHG | HEART RATE: 90 BPM | WEIGHT: 174.4 LBS | HEIGHT: 65 IN | OXYGEN SATURATION: 99 % | SYSTOLIC BLOOD PRESSURE: 124 MMHG

## 2019-07-17 DIAGNOSIS — E05.90 HYPERTHYROIDISM: Primary | ICD-10-CM

## 2019-07-17 DIAGNOSIS — E04.1 THYROID NODULE: ICD-10-CM

## 2019-07-17 DIAGNOSIS — E04.9 GOITER: ICD-10-CM

## 2019-07-17 DIAGNOSIS — E05.00 GRAVES' DISEASE: ICD-10-CM

## 2019-07-17 DIAGNOSIS — E66.3 OVERWEIGHT (BMI 25.0-29.9): ICD-10-CM

## 2019-07-17 PROCEDURE — 3017F COLORECTAL CA SCREEN DOC REV: CPT | Performed by: INTERNAL MEDICINE

## 2019-07-17 PROCEDURE — G8427 DOCREV CUR MEDS BY ELIG CLIN: HCPCS | Performed by: INTERNAL MEDICINE

## 2019-07-17 PROCEDURE — 99214 OFFICE O/P EST MOD 30 MIN: CPT | Performed by: INTERNAL MEDICINE

## 2019-07-17 PROCEDURE — G8417 CALC BMI ABV UP PARAM F/U: HCPCS | Performed by: INTERNAL MEDICINE

## 2019-07-17 PROCEDURE — 1036F TOBACCO NON-USER: CPT | Performed by: INTERNAL MEDICINE

## 2019-07-17 NOTE — PROGRESS NOTES
gland are diffusely enlarged. The right lobe of the thyroid gland measures 6.1 x 2.8 x 2.4 cm. Left lobe of the thyroid gland measures 5.4 x 2.2 x 2.1    cm in size. Echotexture of the gland is mildly heterogeneous with some areas of lobulation. There is a small mixed echogenic nodule identified medially within the inferior pole of the right lobe of the thyroid measuring 0.6 x 0.5 x 0.4 cm.       There is increased Doppler flow identified within the thyroid gland.           Impression           1. Diffusely enlarged thyroid gland with hyperemia on Doppler flow. Thyromegaly is nonspecific but can be seen with Graves' disease, or Hashimoto's thyroiditis. Correlate with thyroid function tests. 2. Small thyroid nodule is identified at the inferior pole the right lobe of the thyroid gland. Recommend sonographic follow-up in one year to confirm stability.       Order History         History reviewed. No pertinent past medical history.   Patient Active Problem List    Diagnosis Date Noted    Hyperthyroidism 2019    Goiter 2019    Thyroid nodule 2019    Overweight (BMI 25.0-29.9) 2019    Acute back pain 2018     Past Surgical History:   Procedure Laterality Date    CHOLECYSTECTOMY      HYSTERECTOMY      TUBAL LIGATION       Family History   Adopted: Yes     Social History     Socioeconomic History    Marital status:      Spouse name: None    Number of children: None    Years of education: None    Highest education level: None   Occupational History    None   Social Needs    Financial resource strain: None    Food insecurity:     Worry: None     Inability: None    Transportation needs:     Medical: None     Non-medical: None   Tobacco Use    Smoking status: Former Smoker     Packs/day: 0.25     Years: 25.00     Pack years: 6.25     Types: Cigarettes     Last attempt to quit: 2019     Years since quittin.4    Smokeless tobacco: Never Used   Substance and Sexual with midline trachea and no masses, thyroid is enlarged, right lobe larger  Lymphatics: normal cervical lymph nodes, normal supraclavicular nodes  Pulmonary: no increased work of breathing or signs of respiratory distress, lungs are clear to auscultation  Cardiovascular: normal heart rate and rhythm, normal S1 and S2, no murmurs and pedal pulses and 2+ bilaterally, No edema  Abdomen: abdomen is soft, non-tender with no masses  Musculoskeletal: normal gait and station and exam of the digits and nails are normal  Neurological: normal coordination and normal general cortical function, has hand tremor      Lab Review:    Lab Results   Component Value Date    WBC 4.6 07/09/2019    HGB 13.7 07/09/2019    HCT 40.8 07/09/2019    MCV 84.6 07/09/2019     07/09/2019     Lab Results   Component Value Date     07/09/2019    K 4.9 07/09/2019     07/09/2019    CO2 24 07/09/2019    BUN 15 07/09/2019    CREATININE <0.5 07/09/2019    GLUCOSE 117 07/09/2019    CALCIUM 9.6 07/09/2019    PROT 6.1 07/09/2019    LABALBU 3.9 07/09/2019    BILITOT 0.5 07/09/2019    ALKPHOS 149 07/09/2019    AST 19 07/09/2019    ALT 28 07/09/2019    LABGLOM >60 07/09/2019    GFRAA >60 07/09/2019    AGRATIO 1.8 07/09/2019    GLOB 2.2 07/09/2019     Lab Results   Component Value Date    TSH <0.01 07/09/2019    FT3 8.4 07/09/2019     No results found for: LABA1C  No results found for: EAG  Lab Results   Component Value Date    CHOL 213 01/13/2017     Lab Results   Component Value Date    TRIG 166 01/13/2017     Lab Results   Component Value Date    HDL 46 01/13/2017     Lab Results   Component Value Date    LDLCALC 134 01/13/2017     Lab Results   Component Value Date    LABVLDL 33 01/13/2017     No results found for: [facility]  Lab Results   Component Value Date    LABMICR Yes 02/21/2018     Lab Results   Component Value Date    VITD25 31.3 01/13/2017        ASSESSMENT/PLAN:  1. Hyperthyroidism  Improved, but uncontrolled.   Same

## 2019-10-09 ENCOUNTER — OFFICE VISIT (OUTPATIENT)
Dept: FAMILY MEDICINE CLINIC | Age: 54
End: 2019-10-09
Payer: COMMERCIAL

## 2019-10-09 ENCOUNTER — TELEPHONE (OUTPATIENT)
Dept: ENDOCRINOLOGY | Age: 54
End: 2019-10-09

## 2019-10-09 VITALS
DIASTOLIC BLOOD PRESSURE: 68 MMHG | WEIGHT: 171.6 LBS | BODY MASS INDEX: 28.56 KG/M2 | HEART RATE: 73 BPM | OXYGEN SATURATION: 98 % | SYSTOLIC BLOOD PRESSURE: 132 MMHG

## 2019-10-09 DIAGNOSIS — R19.7 DIARRHEA, UNSPECIFIED TYPE: Primary | ICD-10-CM

## 2019-10-09 DIAGNOSIS — E05.90 HYPERTHYROIDISM: Primary | ICD-10-CM

## 2019-10-09 DIAGNOSIS — R19.7 DIARRHEA, UNSPECIFIED TYPE: ICD-10-CM

## 2019-10-09 LAB
A/G RATIO: 1.6 (ref 1.1–2.2)
ALBUMIN SERPL-MCNC: 3.8 G/DL (ref 3.4–5)
ALP BLD-CCNC: 161 U/L (ref 40–129)
ALT SERPL-CCNC: 27 U/L (ref 10–40)
AMYLASE: 36 U/L (ref 25–115)
ANION GAP SERPL CALCULATED.3IONS-SCNC: 15 MMOL/L (ref 3–16)
AST SERPL-CCNC: 21 U/L (ref 15–37)
BILIRUB SERPL-MCNC: 0.3 MG/DL (ref 0–1)
BUN BLDV-MCNC: 18 MG/DL (ref 7–20)
CALCIUM SERPL-MCNC: 9.1 MG/DL (ref 8.3–10.6)
CHLORIDE BLD-SCNC: 108 MMOL/L (ref 99–110)
CO2: 20 MMOL/L (ref 21–32)
CREAT SERPL-MCNC: <0.5 MG/DL (ref 0.6–1.1)
GFR AFRICAN AMERICAN: >60
GFR NON-AFRICAN AMERICAN: >60
GLOBULIN: 2.4 G/DL
GLUCOSE BLD-MCNC: 92 MG/DL (ref 70–99)
LIPASE: 20 U/L (ref 13–60)
POTASSIUM SERPL-SCNC: 4.2 MMOL/L (ref 3.5–5.1)
SODIUM BLD-SCNC: 143 MMOL/L (ref 136–145)
TOTAL PROTEIN: 6.2 G/DL (ref 6.4–8.2)

## 2019-10-09 PROCEDURE — 1036F TOBACCO NON-USER: CPT | Performed by: NURSE PRACTITIONER

## 2019-10-09 PROCEDURE — G8427 DOCREV CUR MEDS BY ELIG CLIN: HCPCS | Performed by: NURSE PRACTITIONER

## 2019-10-09 PROCEDURE — 99213 OFFICE O/P EST LOW 20 MIN: CPT | Performed by: NURSE PRACTITIONER

## 2019-10-09 PROCEDURE — 3017F COLORECTAL CA SCREEN DOC REV: CPT | Performed by: NURSE PRACTITIONER

## 2019-10-09 PROCEDURE — G8417 CALC BMI ABV UP PARAM F/U: HCPCS | Performed by: NURSE PRACTITIONER

## 2019-10-09 PROCEDURE — G8484 FLU IMMUNIZE NO ADMIN: HCPCS | Performed by: NURSE PRACTITIONER

## 2019-10-09 ASSESSMENT — ENCOUNTER SYMPTOMS
ABDOMINAL PAIN: 0
RECTAL PAIN: 0
ABDOMINAL DISTENTION: 0
NAUSEA: 0
VOMITING: 0
BLOOD IN STOOL: 0
CONSTIPATION: 0
DIARRHEA: 1

## 2019-10-30 ENCOUNTER — TELEPHONE (OUTPATIENT)
Dept: ENDOCRINOLOGY | Age: 54
End: 2019-10-30

## 2019-10-30 ENCOUNTER — OFFICE VISIT (OUTPATIENT)
Dept: ENDOCRINOLOGY | Age: 54
End: 2019-10-30
Payer: COMMERCIAL

## 2019-10-30 VITALS
BODY MASS INDEX: 28.49 KG/M2 | HEIGHT: 65 IN | HEART RATE: 87 BPM | WEIGHT: 171 LBS | OXYGEN SATURATION: 96 % | SYSTOLIC BLOOD PRESSURE: 132 MMHG | DIASTOLIC BLOOD PRESSURE: 64 MMHG

## 2019-10-30 DIAGNOSIS — E04.9 GOITER: ICD-10-CM

## 2019-10-30 DIAGNOSIS — F17.200 SMOKING: ICD-10-CM

## 2019-10-30 DIAGNOSIS — E05.90 HYPERTHYROIDISM: Primary | ICD-10-CM

## 2019-10-30 DIAGNOSIS — E66.3 OVERWEIGHT (BMI 25.0-29.9): ICD-10-CM

## 2019-10-30 DIAGNOSIS — E04.1 THYROID NODULE: ICD-10-CM

## 2019-10-30 PROCEDURE — G8484 FLU IMMUNIZE NO ADMIN: HCPCS | Performed by: INTERNAL MEDICINE

## 2019-10-30 PROCEDURE — G8427 DOCREV CUR MEDS BY ELIG CLIN: HCPCS | Performed by: INTERNAL MEDICINE

## 2019-10-30 PROCEDURE — 4004F PT TOBACCO SCREEN RCVD TLK: CPT | Performed by: INTERNAL MEDICINE

## 2019-10-30 PROCEDURE — G8417 CALC BMI ABV UP PARAM F/U: HCPCS | Performed by: INTERNAL MEDICINE

## 2019-10-30 PROCEDURE — 99214 OFFICE O/P EST MOD 30 MIN: CPT | Performed by: INTERNAL MEDICINE

## 2019-10-30 PROCEDURE — 3017F COLORECTAL CA SCREEN DOC REV: CPT | Performed by: INTERNAL MEDICINE

## 2019-11-04 DIAGNOSIS — E05.90 HYPERTHYROIDISM: ICD-10-CM

## 2019-11-04 LAB
T3 FREE: 25.6 PG/ML (ref 2.3–4.2)
T3 TOTAL: 5.35 NG/ML (ref 0.8–2)
T4 FREE: 6.3 NG/DL (ref 0.9–1.8)
T4 TOTAL: 17.2 UG/DL (ref 4.5–10.9)
TSH SERPL DL<=0.05 MIU/L-ACNC: <0.01 UIU/ML (ref 0.27–4.2)

## 2019-11-08 RX ORDER — METHIMAZOLE 10 MG/1
10 TABLET ORAL 3 TIMES DAILY
Qty: 90 TABLET | Refills: 2 | Status: SHIPPED | OUTPATIENT
Start: 2019-11-08 | End: 2020-02-20 | Stop reason: SDUPTHER

## 2019-11-13 DIAGNOSIS — E05.90 HYPERTHYROIDISM: ICD-10-CM

## 2019-11-14 ENCOUNTER — OFFICE VISIT (OUTPATIENT)
Dept: PRIMARY CARE CLINIC | Age: 54
End: 2019-11-14
Payer: COMMERCIAL

## 2019-11-14 VITALS
WEIGHT: 170.2 LBS | TEMPERATURE: 98.1 F | HEART RATE: 84 BPM | DIASTOLIC BLOOD PRESSURE: 74 MMHG | SYSTOLIC BLOOD PRESSURE: 138 MMHG | BODY MASS INDEX: 28.32 KG/M2

## 2019-11-14 DIAGNOSIS — R00.2 PALPITATIONS: ICD-10-CM

## 2019-11-14 DIAGNOSIS — F17.200 SMOKING: ICD-10-CM

## 2019-11-14 DIAGNOSIS — E05.90 HYPERTHYROIDISM: Primary | ICD-10-CM

## 2019-11-14 DIAGNOSIS — R11.0 NAUSEA: ICD-10-CM

## 2019-11-14 PROCEDURE — 3017F COLORECTAL CA SCREEN DOC REV: CPT | Performed by: INTERNAL MEDICINE

## 2019-11-14 PROCEDURE — G8417 CALC BMI ABV UP PARAM F/U: HCPCS | Performed by: INTERNAL MEDICINE

## 2019-11-14 PROCEDURE — G8484 FLU IMMUNIZE NO ADMIN: HCPCS | Performed by: INTERNAL MEDICINE

## 2019-11-14 PROCEDURE — 99214 OFFICE O/P EST MOD 30 MIN: CPT | Performed by: INTERNAL MEDICINE

## 2019-11-14 PROCEDURE — G8427 DOCREV CUR MEDS BY ELIG CLIN: HCPCS | Performed by: INTERNAL MEDICINE

## 2019-11-14 PROCEDURE — 4004F PT TOBACCO SCREEN RCVD TLK: CPT | Performed by: INTERNAL MEDICINE

## 2019-11-14 RX ORDER — ATENOLOL 25 MG/1
25 TABLET ORAL DAILY
Qty: 90 TABLET | Refills: 0 | Status: SHIPPED | OUTPATIENT
Start: 2019-11-14 | End: 2020-02-07

## 2019-11-14 RX ORDER — ONDANSETRON 4 MG/1
4 TABLET, FILM COATED ORAL EVERY 12 HOURS PRN
Qty: 14 TABLET | Refills: 0 | Status: SHIPPED | OUTPATIENT
Start: 2019-11-14 | End: 2020-06-08 | Stop reason: CLARIF

## 2020-02-07 RX ORDER — ATENOLOL 25 MG/1
25 TABLET ORAL DAILY
Qty: 90 TABLET | Refills: 0 | Status: SHIPPED | OUTPATIENT
Start: 2020-02-07 | End: 2020-03-11 | Stop reason: ALTCHOICE

## 2020-02-20 RX ORDER — METHIMAZOLE 10 MG/1
10 TABLET ORAL 3 TIMES DAILY
Qty: 90 TABLET | Refills: 0 | Status: SHIPPED | OUTPATIENT
Start: 2020-02-20 | End: 2020-02-21 | Stop reason: SDUPTHER

## 2020-02-21 RX ORDER — METHIMAZOLE 10 MG/1
10 TABLET ORAL 3 TIMES DAILY
Qty: 270 TABLET | Refills: 0 | Status: SHIPPED | OUTPATIENT
Start: 2020-02-21 | End: 2020-03-11

## 2020-02-21 NOTE — TELEPHONE ENCOUNTER
I spoke with patient. She feels overall better. Complains of hot flashes. Sent prescription for methimazole to [facility].

## 2020-02-21 NOTE — TELEPHONE ENCOUNTER
Spoke with pt and advised to keep appointment and to have labs done 1 week before her appointment. Pt asked that the prescription be sent too [facility].

## 2020-03-06 DIAGNOSIS — E05.90 HYPERTHYROIDISM: ICD-10-CM

## 2020-03-06 LAB
A/G RATIO: 1.5 (ref 1.1–2.2)
ALBUMIN SERPL-MCNC: 4 G/DL (ref 3.4–5)
ALP BLD-CCNC: 203 U/L (ref 40–129)
ALT SERPL-CCNC: 24 U/L (ref 10–40)
ANION GAP SERPL CALCULATED.3IONS-SCNC: 11 MMOL/L (ref 3–16)
AST SERPL-CCNC: 15 U/L (ref 15–37)
BASOPHILS ABSOLUTE: 0.1 K/UL (ref 0–0.2)
BASOPHILS RELATIVE PERCENT: 0.9 %
BILIRUB SERPL-MCNC: <0.2 MG/DL (ref 0–1)
BUN BLDV-MCNC: 22 MG/DL (ref 7–20)
CALCIUM SERPL-MCNC: 9.1 MG/DL (ref 8.3–10.6)
CHLORIDE BLD-SCNC: 103 MMOL/L (ref 99–110)
CO2: 24 MMOL/L (ref 21–32)
CREAT SERPL-MCNC: 0.6 MG/DL (ref 0.6–1.1)
EOSINOPHILS ABSOLUTE: 0.3 K/UL (ref 0–0.6)
EOSINOPHILS RELATIVE PERCENT: 4.2 %
GFR AFRICAN AMERICAN: >60
GFR NON-AFRICAN AMERICAN: >60
GLOBULIN: 2.6 G/DL
GLUCOSE BLD-MCNC: 114 MG/DL (ref 70–99)
HCT VFR BLD CALC: 46.9 % (ref 36–48)
HEMOGLOBIN: 15.9 G/DL (ref 12–16)
LYMPHOCYTES ABSOLUTE: 2.1 K/UL (ref 1–5.1)
LYMPHOCYTES RELATIVE PERCENT: 31.1 %
MCH RBC QN AUTO: 32.1 PG (ref 26–34)
MCHC RBC AUTO-ENTMCNC: 33.9 G/DL (ref 31–36)
MCV RBC AUTO: 94.8 FL (ref 80–100)
MONOCYTES ABSOLUTE: 0.6 K/UL (ref 0–1.3)
MONOCYTES RELATIVE PERCENT: 9.7 %
NEUTROPHILS ABSOLUTE: 3.6 K/UL (ref 1.7–7.7)
NEUTROPHILS RELATIVE PERCENT: 54.1 %
PDW BLD-RTO: 14.8 % (ref 12.4–15.4)
PLATELET # BLD: 248 K/UL (ref 135–450)
PMV BLD AUTO: 9.3 FL (ref 5–10.5)
POTASSIUM SERPL-SCNC: 4.1 MMOL/L (ref 3.5–5.1)
RBC # BLD: 4.94 M/UL (ref 4–5.2)
SODIUM BLD-SCNC: 138 MMOL/L (ref 136–145)
T3 FREE: 3 PG/ML (ref 2.3–4.2)
T3 TOTAL: 1.18 NG/ML (ref 0.8–2)
T4 FREE: 0.6 NG/DL (ref 0.9–1.8)
T4 TOTAL: 4.3 UG/DL (ref 4.5–10.9)
TOTAL PROTEIN: 6.6 G/DL (ref 6.4–8.2)
TSH SERPL DL<=0.05 MIU/L-ACNC: 0.03 UIU/ML (ref 0.27–4.2)
WBC # BLD: 6.6 K/UL (ref 4–11)

## 2020-03-09 LAB — TSH RECEPTOR AB: >40 IU/L

## 2020-03-10 LAB — THYROID STIMULATING IMMUNOGLOBULIN: >40 IU/L

## 2020-03-11 ENCOUNTER — OFFICE VISIT (OUTPATIENT)
Dept: ENDOCRINOLOGY | Age: 55
End: 2020-03-11
Payer: COMMERCIAL

## 2020-03-11 VITALS
HEART RATE: 91 BPM | WEIGHT: 193 LBS | SYSTOLIC BLOOD PRESSURE: 132 MMHG | DIASTOLIC BLOOD PRESSURE: 76 MMHG | OXYGEN SATURATION: 97 % | BODY MASS INDEX: 32.15 KG/M2 | HEIGHT: 65 IN

## 2020-03-11 PROCEDURE — 3017F COLORECTAL CA SCREEN DOC REV: CPT | Performed by: INTERNAL MEDICINE

## 2020-03-11 PROCEDURE — 99214 OFFICE O/P EST MOD 30 MIN: CPT | Performed by: INTERNAL MEDICINE

## 2020-03-11 PROCEDURE — G8417 CALC BMI ABV UP PARAM F/U: HCPCS | Performed by: INTERNAL MEDICINE

## 2020-03-11 PROCEDURE — G8427 DOCREV CUR MEDS BY ELIG CLIN: HCPCS | Performed by: INTERNAL MEDICINE

## 2020-03-11 PROCEDURE — G8484 FLU IMMUNIZE NO ADMIN: HCPCS | Performed by: INTERNAL MEDICINE

## 2020-03-11 PROCEDURE — 1036F TOBACCO NON-USER: CPT | Performed by: INTERNAL MEDICINE

## 2020-03-11 RX ORDER — METHIMAZOLE 5 MG/1
15 TABLET ORAL DAILY
Qty: 90 TABLET | Refills: 3 | Status: SHIPPED | OUTPATIENT
Start: 2020-03-11 | End: 2020-05-20 | Stop reason: SDUPTHER

## 2020-03-11 NOTE — PROGRESS NOTES
Comment: socially    Drug use: No    Sexual activity: Yes     Partners: Male   Lifestyle    Physical activity     Days per week: None     Minutes per session: None    Stress: None   Relationships    Social connections     Talks on phone: None     Gets together: None     Attends Hindu service: None     Active member of club or organization: None     Attends meetings of clubs or organizations: None     Relationship status: None    Intimate partner violence     Fear of current or ex partner: None     Emotionally abused: None     Physically abused: None     Forced sexual activity: None   Other Topics Concern    None   Social History Narrative    None     Current Outpatient Medications   Medication Sig Dispense Refill    methIMAzole (TAPAZOLE) 10 MG tablet Take 1 tablet by mouth 3 times daily 270 tablet 0    atenolol (TENORMIN) 25 MG tablet TAKE 1 TABLET BY MOUTH  DAILY 90 tablet 0    ondansetron (ZOFRAN) 4 MG tablet Take 1 tablet by mouth every 12 hours as needed for Nausea or Vomiting 14 tablet 0     No current facility-administered medications for this visit. No Known Allergies  No family status information on file.        Review of Systems:  Constitutional: has fatigue, no fever, no recent weight gain, has recent weight loss, no changes in appetite  Eyes: no eye pain, no change in vision, no eye redness, no eye irritation, no double vision  Ears, nose, throat: no nasal congestion, no sore throat, no earache, no decrease in hearing, hsa hoarseness, no dry mouth, no sinus problems, has difficulty swallowing, no neck lumps, no dental problems, no mouth sores, no ringing in ears  Pulmonary: has shortness of breath, no wheezing, no dyspnea on exertion, has cough  Cardiovascular: has chest pain, has lower extremity edema, no orthopnea, no intermittent leg claudication, has palpitations  Gastrointestinal: no abdominal pain, no nausea, no vomiting, has diarrhea, no constipation, no dysphagia, no heartburn, no bloating  Genitourinary: no dysuria, no urinary incontinence, no urinary hesitancy, no urinary frequency, no feelings of urinary urgency, has nocturia  Musculoskeletal: no joint swelling, no joint stiffness, no joint pain, no muscle cramps, has muscle pain, no bone pain  Integument/Breast: no hair loss, no skin rashes, no skin lesions, no itching, no dry skin  Neurological: no numbness, no tingling, no weakness, no confusion, has headaches, has dizziness, no fainting, no tremors, no decrease in memory, no balance problems  Psychiatric: has anxiety, no depression, no insomnia  Hematologic/Lymphatic: no tendency for easy bleeding, no swollen lymph nodes, no tendency for easy bruising  Immunology: no seasonal allergies, no frequent infections, no frequent illnesses  Endocrine: no temperature intolerance, has hand tremor    /76 (Site: Left Upper Arm, Position: Sitting, Cuff Size: Large Adult)   Pulse 91   Ht 5' 5\" (1.651 m)   Wt 193 lb (87.5 kg)   SpO2 97%   BMI 32.12 kg/m²    Wt Readings from Last 3 Encounters:   03/11/20 193 lb (87.5 kg)   11/14/19 170 lb 3.2 oz (77.2 kg)   10/30/19 171 lb (77.6 kg)     Body mass index is 32.12 kg/m².     OBJECTIVE:  Constitutional: no acute distress, well appearing and well nourished  Psychiatric: oriented to person, place and time, judgement and insight and normal, recent and remote memory and intact and mood and affect are normal  Skin: skin and subcutaneous tissue is normal without mass, normal turgor  Head and Face: examination of head and face revealed no abnormalities  Eyes: no lid or conjunctival swelling, erythema or discharge, pupils are normal, equal, round, reactive to light  Ears/Nose: external inspection of ears and nose revealed no abnormalities, hearing is grossly normal  Oropharynx/Mouth/Face: lips, tongue and gums are normal with no lesions, the voice quality was normal  Neck: neck is supple and symmetric, with midline trachea and no masses, thyroid is enlarged, right lobe larger  Lymphatics: normal cervical lymph nodes, normal supraclavicular nodes  Pulmonary: no increased work of breathing or signs of respiratory distress, lungs are clear to auscultation  Cardiovascular: normal heart rate and rhythm, normal S1 and S2, no murmurs and pedal pulses and 2+ bilaterally, No edema  Abdomen: abdomen is soft, non-tender with no masses  Musculoskeletal: normal gait and station and exam of the digits and nails are normal  Neurological: normal coordination and normal general cortical function, no hand tremor      Lab Review:    Lab Results   Component Value Date    WBC 6.6 03/06/2020    HGB 15.9 03/06/2020    HCT 46.9 03/06/2020    MCV 94.8 03/06/2020     03/06/2020     Lab Results   Component Value Date     03/06/2020    K 4.1 03/06/2020     03/06/2020    CO2 24 03/06/2020    BUN 22 03/06/2020    CREATININE 0.6 03/06/2020    GLUCOSE 114 03/06/2020    CALCIUM 9.1 03/06/2020    PROT 6.6 03/06/2020    LABALBU 4.0 03/06/2020    BILITOT <0.2 03/06/2020    ALKPHOS 203 03/06/2020    AST 15 03/06/2020    ALT 24 03/06/2020    LABGLOM >60 03/06/2020    GFRAA >60 03/06/2020    AGRATIO 1.5 03/06/2020    GLOB 2.6 03/06/2020     Lab Results   Component Value Date    TSH 0.03 03/06/2020    FT3 3.0 03/06/2020     No results found for: LABA1C  No results found for: EAG  Lab Results   Component Value Date    CHOL 213 01/13/2017     Lab Results   Component Value Date    TRIG 166 01/13/2017     Lab Results   Component Value Date    HDL 46 01/13/2017     Lab Results   Component Value Date    LDLCALC 134 01/13/2017     Lab Results   Component Value Date    LABVLDL 33 01/13/2017     No results found for: [facility]  Lab Results   Component Value Date    LABMICR Yes 02/21/2018     Lab Results   Component Value Date    VITD25 31.3 01/13/2017        ASSESSMENT/PLAN:  1.  Hyperthyroidism  Noncompliant with appointments  Very good response, T4, T3, free T4, free T3 increased. TR antibody, TSI positive. Thyroid uptake and scan consistent with Graves' disease  Stopped MMI because of severe nausea, resumed, still has nausea. I-131 Tx or surgery, patient does not know yet. Decrease Methimazole to 15 mg daily  - T3; Future  - T3, Free; Future  - T4; Future  - T4, Free; Future  - CBC Auto Differential; Future  - Comprehensive Metabolic Panel; Future  - TSH without Reflex; Future    2. Goiter  Thyroid sono    3. Thyroid nodule  Thyroid sono follow up. 4. Obesity  Diet, exercise. Reviewed and/or ordered clinical lab results Yes  Reviewed and/or ordered radiology tests Yes   Reviewed and/or ordered other diagnostic tests No  Discussed test results with performing physician No  Independently reviewed image, tracing, or specimen No  Made a decision to obtain old records No  Reviewed and summarized old records Yes  TSH less than 0.01  FT4 5.7  TSH 0.63 in 2017  Obtained history from other than patient No    Jordi Arce was counseled regarding symptoms of thyroid diagnosis, course and complications of disease if inadequately treated, side effects of medications, diagnosis, treatment options, and prognosis, risks, benefits, complications, and alternatives of treatment, labs, imaging and other studies and treatment targets and goals, I-131 Tx, methimazole, side effects, remission, permanent hypothyroidism, Graves' eye disease, smoking cessations, risk of worsening of eye disease with A-686, surgery, complications. She understands instructions and counseling. Return in about 1 month (around 4/11/2020) for thyroid problems.

## 2020-03-13 ENCOUNTER — OFFICE VISIT (OUTPATIENT)
Dept: ENT CLINIC | Age: 55
End: 2020-03-13
Payer: COMMERCIAL

## 2020-03-13 VITALS
SYSTOLIC BLOOD PRESSURE: 138 MMHG | WEIGHT: 192 LBS | DIASTOLIC BLOOD PRESSURE: 78 MMHG | BODY MASS INDEX: 31.99 KG/M2 | TEMPERATURE: 97.7 F | HEIGHT: 65 IN | HEART RATE: 81 BPM

## 2020-03-13 PROCEDURE — 99204 OFFICE O/P NEW MOD 45 MIN: CPT | Performed by: OTOLARYNGOLOGY

## 2020-03-13 PROCEDURE — G8484 FLU IMMUNIZE NO ADMIN: HCPCS | Performed by: OTOLARYNGOLOGY

## 2020-03-13 PROCEDURE — 31575 DIAGNOSTIC LARYNGOSCOPY: CPT | Performed by: OTOLARYNGOLOGY

## 2020-03-13 PROCEDURE — G8427 DOCREV CUR MEDS BY ELIG CLIN: HCPCS | Performed by: OTOLARYNGOLOGY

## 2020-03-13 PROCEDURE — G8417 CALC BMI ABV UP PARAM F/U: HCPCS | Performed by: OTOLARYNGOLOGY

## 2020-03-13 ASSESSMENT — ENCOUNTER SYMPTOMS
CONSTIPATION: 0
BLOOD IN STOOL: 0
EYE DISCHARGE: 0
COUGH: 0
SINUS PAIN: 0
VOICE CHANGE: 0
STRIDOR: 0
COLOR CHANGE: 0
FACIAL SWELLING: 0
DIARRHEA: 0
WHEEZING: 0
EYE ITCHING: 0
SHORTNESS OF BREATH: 0
SORE THROAT: 0
TROUBLE SWALLOWING: 1
CHOKING: 0
BACK PAIN: 0
RHINORRHEA: 0
SINUS PRESSURE: 0
NAUSEA: 0
PHOTOPHOBIA: 0
VOMITING: 0

## 2020-03-13 NOTE — PROGRESS NOTES
[facility]  [facility], [address]  P: [phone]  F: [phone]       Patient     Isaiah Cantrell  [birth year]    ChiefComplaint     Chief Complaint   Patient presents with    Thyroid Problem     Patient is here today because she would like to discuss having her thyroid removed       History of Present Illness     Isaiah Catnrell is a pleasant 53 y.o. female who was referred by her endocrinologist Dr. Mary Kay Parra to discuss thyroidectomy for Graves' disease. She is hyperthyroid with TR antibody and TSI positive. TSH less than 0.01 thyroid uptake scan consistent with Graves' disease. Stop MMI  because of severe nausea. Currently on 15 mg of methimazole daily. She is interested in thyroidectomy for treating her Graves' disease and hyperthyroidism. Thyroid ultrasound reveals enlarged thyroid with no nodules greater than 1 cm. No family history of thyroid cancer. No history of radiation exposure to the neck. She does have some compressive symptoms with dysphasia. Denies change in voice. Past Medical History     No past medical history on file.     Past Surgical History     Past Surgical History:   Procedure Laterality Date    CHOLECYSTECTOMY      HYSTERECTOMY      TUBAL LIGATION         Family History     Family History   Adopted: Yes       Social History     Social History     Socioeconomic History    Marital status:      Spouse name: Not on file    Number of children: Not on file    Years of education: Not on file    Highest education level: Not on file   Occupational History    Not on file   Social Needs    Financial resource strain: Not on file    Food insecurity     Worry: Not on file     Inability: Not on file    Transportation needs     Medical: Not on file     Non-medical: Not on file   Tobacco Use    Smoking status: Former Smoker     Packs/day: 0.25     Years: 25.00     Pack years: 6.25     Types: Cigarettes    Smokeless constipation, diarrhea, nausea and vomiting. Endocrine: Negative for cold intolerance, heat intolerance, polyphagia and polyuria. Musculoskeletal: Negative for back pain, gait problem, neck pain and neck stiffness. Skin: Negative for color change, pallor, rash and wound. Neurological: Negative for dizziness, syncope, facial asymmetry, speech difficulty, light-headedness, numbness and headaches. Hematological: Negative for adenopathy. Does not bruise/bleed easily. Psychiatric/Behavioral: Negative for agitation, confusion and sleep disturbance. PhysicalExam     Vitals:    03/13/20 1314   BP: 138/78   Pulse: 81   Temp: 97.7 °F (36.5 °C)       Physical Exam  Constitutional:       Appearance: She is well-developed. HENT:      Head: Normocephalic and atraumatic. Not macrocephalic and not microcephalic. No raccoon eyes, Pak's sign, abrasion, contusion, right periorbital erythema, left periorbital erythema or laceration. Hair is normal.      Jaw: No trismus. Right Ear: Hearing, tympanic membrane and external ear normal. No decreased hearing noted. No drainage, swelling or tenderness. No middle ear effusion. No mastoid tenderness. Tympanic membrane is not perforated, retracted or bulging. Tympanic membrane has normal mobility. Left Ear: Hearing, tympanic membrane and external ear normal. No decreased hearing noted. No drainage, swelling or tenderness. No middle ear effusion. No mastoid tenderness. Tympanic membrane is not perforated, retracted or bulging. Tympanic membrane has normal mobility. Nose: No nasal deformity, septal deviation, laceration, mucosal edema or rhinorrhea. Right Sinus: No maxillary sinus tenderness or frontal sinus tenderness. Left Sinus: No maxillary sinus tenderness or frontal sinus tenderness. Mouth/Throat:      Mouth: Mucous membranes are not pale, not dry and not cyanotic. No lacerations or oral lesions. Dentition: Normal dentition.  No dental caries or dental abscesses. Pharynx: Uvula midline. No oropharyngeal exudate, posterior oropharyngeal erythema or uvula swelling. Tonsils: No tonsillar abscesses. Eyes:      General: Lids are normal.         Right eye: No discharge. Left eye: No discharge. Extraocular Movements:      Right eye: Normal extraocular motion and no nystagmus. Left eye: Normal extraocular motion and no nystagmus. Conjunctiva/sclera:      Right eye: No chemosis or exudate. Left eye: No chemosis or exudate. Neck:      Musculoskeletal: Neck supple. Thyroid: Thyromegaly present. No thyroid mass. Vascular: Normal carotid pulses. Trachea: No tracheal tenderness or tracheal deviation. Cardiovascular:      Rate and Rhythm: Normal rate and regular rhythm. Pulmonary:      Effort: No tachypnea, bradypnea or respiratory distress. Breath sounds: No stridor. Musculoskeletal:      Right shoulder: She exhibits normal range of motion. Lymphadenopathy:      Head:      Right side of head: No submental, submandibular, tonsillar, preauricular, posterior auricular or occipital adenopathy. Left side of head: No submental, submandibular, tonsillar, preauricular, posterior auricular or occipital adenopathy. Cervical: No cervical adenopathy. Right cervical: No superficial, deep or posterior cervical adenopathy. Left cervical: No superficial, deep or posterior cervical adenopathy. Skin:     General: Skin is warm and dry. Findings: No bruising, erythema, laceration, lesion or rash. Neurological:      Mental Status: She is alert and oriented to person, place, and time. Cranial Nerves: No cranial nerve deficit. Psychiatric:         Speech: Speech normal.         Behavior: Behavior normal.           Procedure     Flexible Laryngoscopy    Pre op: Hyperthyroidism, Graves' disease.    Procedure : Flexible Laryngoscopy  Surgeon: Roberto Jarvis DO  Anesthesia: Afrin with 4% lidocaine  Indication: Laryngeal mirror examination was not tolerated due to gag reflex  Description:  The scope was passed along the floor of the right naris to the level of the larynx. There was no evidence of concerning masses or lesions of the base of tongue, vallecula, epiglottis, aryepiglottic folds, arytenoids, false vocal folds, true vocal folds, or pyriform sinuses. True vocal folds exhibited symmetric motion bilaterally without evidence of paralysis or paresis. The scope was removed. The patient tolerated the procedure without difficulty. There were no complications. Pertinent findings: Normal examination           Assessment and Plan     1. Graves disease  The patient is hyperthyroid with a TSH of less than 0.01, not tolerating medical therapy with Graves' disease. She was referred here to discuss surgical excision of her thyroid to control her hyperthyroidism. Risks, benefits and alternatives of the procedure discussed with the patient. Risks include, but not limited to bleeding, infection, pain, hematoma and airway obstruction, temporary or permanent damage to the recurrent laryngeal nerves, need for tracheostomy, hypocalcemia and hypoparathyroidism. Patient understands the risks and is willing to proceed with the procedure. She will fill the necessary paperwork and schedule a time. 2. Hyperthyroidism        Return for 1 week post op. Portions of this note were dictated using Dragon.  There may be linguistic errors secondary to the use of this program.

## 2020-03-16 ENCOUNTER — TELEPHONE (OUTPATIENT)
Dept: ENT CLINIC | Age: 55
End: 2020-03-16

## 2020-03-16 NOTE — TELEPHONE ENCOUNTER
Received surgery order from Dr. Rebeca Mcdonald on Friday. Patient is to be scheduled for a total thyroidectomy on 3/23/2020.

## 2020-04-22 ENCOUNTER — VIRTUAL VISIT (OUTPATIENT)
Dept: ENDOCRINOLOGY | Age: 55
End: 2020-04-22
Payer: COMMERCIAL

## 2020-04-22 ENCOUNTER — TELEPHONE (OUTPATIENT)
Dept: PRIMARY CARE CLINIC | Age: 55
End: 2020-04-22

## 2020-04-22 PROCEDURE — 3017F COLORECTAL CA SCREEN DOC REV: CPT | Performed by: INTERNAL MEDICINE

## 2020-04-22 PROCEDURE — 99214 OFFICE O/P EST MOD 30 MIN: CPT | Performed by: INTERNAL MEDICINE

## 2020-04-22 PROCEDURE — G8427 DOCREV CUR MEDS BY ELIG CLIN: HCPCS | Performed by: INTERNAL MEDICINE

## 2020-04-22 NOTE — TELEPHONE ENCOUNTER
Spoke with patient. Explained to her that a pre-op could not be done as a virtual visit. The office may not open up to appointments until May. She should contact her surgeon's office to let them know of the situation. She is aware that if her surgery is put off until June she will need to choose a new primary care provider.

## 2020-04-22 NOTE — PROGRESS NOTES
Review:    Lab Results   Component Value Date    WBC 6.6 03/06/2020    HGB 15.9 03/06/2020    HCT 46.9 03/06/2020    MCV 94.8 03/06/2020     03/06/2020     Lab Results   Component Value Date     03/06/2020    K 4.1 03/06/2020     03/06/2020    CO2 24 03/06/2020    BUN 22 03/06/2020    CREATININE 0.6 03/06/2020    GLUCOSE 114 03/06/2020    CALCIUM 9.1 03/06/2020    PROT 6.6 03/06/2020    LABALBU 4.0 03/06/2020    BILITOT <0.2 03/06/2020    ALKPHOS 203 03/06/2020    AST 15 03/06/2020    ALT 24 03/06/2020    LABGLOM >60 03/06/2020    GFRAA >60 03/06/2020    AGRATIO 1.5 03/06/2020    GLOB 2.6 03/06/2020     Lab Results   Component Value Date    TSH 0.03 03/06/2020    FT3 3.0 03/06/2020     No results found for: LABA1C  No results found for: EAG  Lab Results   Component Value Date    CHOL 213 01/13/2017     Lab Results   Component Value Date    TRIG 166 01/13/2017     Lab Results   Component Value Date    HDL 46 01/13/2017     Lab Results   Component Value Date    LDLCALC 134 01/13/2017     Lab Results   Component Value Date    LABVLDL 33 01/13/2017     No results found for: [facility]  Lab Results   Component Value Date    LABMICR Yes 02/21/2018     Lab Results   Component Value Date    VITD25 31.3 01/13/2017        ASSESSMENT/PLAN:  1. Hyperthyroidism  Referred patient for surgical consultation  Patient saw ENT surgeon for thyroidectomy, will schedule when allowed  Patient will notify me with date of surgery, if longer than 6 weeks wait, obtain labs. Will need SSKI for preparation for surgery. Will send Rx to pharmacy when date is known. TR antibody, TSI positive. Thyroid uptake and scan consistent with Graves' disease  Stopped MMI because of severe nausea, resumed, but still had nausea.   Decreased Methimazole to 15 mg daily, tolerates it well, no nausea  - T3; Future  - T3, Free; Future  - T4; Future  - T4, Free; Future  - CBC Auto Differential; Future  - Comprehensive Metabolic Panel; Future  - TSH without Reflex; Future    2. Goiter  Patient saw ENT surgeon for thyroidectomy  Thyroid sono    3. Thyroid nodule  Thyroid sono follow up. 4. Obesity  Diet, exercise. Reviewed and/or ordered clinical lab results Yes  Reviewed and/or ordered radiology tests Yes   Reviewed and/or ordered other diagnostic tests yes- nuclear medicine thyroid uptake and scan  Discussed test results with performing physician No  Independently reviewed image, tracing, or specimen yes, reviewed images of thyroid ultrasound  Made a decision to obtain old records No  Reviewed and summarized old records Yes  TSH less than 0.01  FT4 5.7  TSH 0.63 in 2017  Obtained history from other than patient No    Garrick Judd was counseled regarding symptoms of thyroid diagnosis, course and complications of disease if inadequately treated, side effects of medications, diagnosis, treatment options, and prognosis, risks, benefits, complications, and alternatives of treatment, labs, imaging and other studies and treatment targets and goals, methimazole, side effects, permanent hypothyroidism after surgery, Graves' eye disease, surgery, complications, SSKI prior to surgery. She understands instructions and counseling. This was a video visit, including two-way audio and video communication, in lieu of an in-person visit due to coronavirus emergency. Patient provided verbal consent to use the video visit. I conducted an interview, performed a limited exam by video and educated the patient on my assessment and plan. Pursuant to the emergency declaration under the [facility] [address] waiver authority and the [employer] General Act, this Virtual  Visit was conducted, with patient's consent, to reduce the patient's risk of exposure to COVID-19 and provide continuity of care for an established patient.     Services were provided through a video

## 2020-05-04 RX ORDER — ATENOLOL 25 MG/1
25 TABLET ORAL DAILY
Qty: 90 TABLET | Refills: 0 | Status: ON HOLD | OUTPATIENT
Start: 2020-05-04 | End: 2020-06-30 | Stop reason: HOSPADM

## 2020-05-05 NOTE — TELEPHONE ENCOUNTER
I sent prescription for atenolol. Please ask patient to schedule appointment for the end of June, will be 2 months as discussed during appointment.

## 2020-06-01 ENCOUNTER — TELEPHONE (OUTPATIENT)
Dept: PRIMARY CARE CLINIC | Age: 55
End: 2020-06-01

## 2020-06-08 ENCOUNTER — OFFICE VISIT (OUTPATIENT)
Dept: INTERNAL MEDICINE CLINIC | Age: 55
End: 2020-06-08
Payer: COMMERCIAL

## 2020-06-08 VITALS — TEMPERATURE: 98 F | WEIGHT: 176 LBS | BODY MASS INDEX: 29.32 KG/M2 | HEIGHT: 65 IN

## 2020-06-08 PROCEDURE — 3017F COLORECTAL CA SCREEN DOC REV: CPT | Performed by: HOSPITALIST

## 2020-06-08 PROCEDURE — G8417 CALC BMI ABV UP PARAM F/U: HCPCS | Performed by: HOSPITALIST

## 2020-06-08 PROCEDURE — 99214 OFFICE O/P EST MOD 30 MIN: CPT | Performed by: HOSPITALIST

## 2020-06-08 PROCEDURE — 93000 ELECTROCARDIOGRAM COMPLETE: CPT | Performed by: HOSPITALIST

## 2020-06-08 PROCEDURE — 1036F TOBACCO NON-USER: CPT | Performed by: HOSPITALIST

## 2020-06-08 PROCEDURE — G8427 DOCREV CUR MEDS BY ELIG CLIN: HCPCS | Performed by: HOSPITALIST

## 2020-06-08 ASSESSMENT — ENCOUNTER SYMPTOMS
TROUBLE SWALLOWING: 1
ALLERGIC/IMMUNOLOGIC NEGATIVE: 1
BACK PAIN: 1
CHOKING: 1
DIARRHEA: 1

## 2020-06-08 NOTE — PROGRESS NOTES
Neck:      Musculoskeletal: Normal range of motion and neck supple. No neck rigidity or muscular tenderness. Vascular: No carotid bruit. Comments: Thyroid gland is diffusely enlarged  Cardiovascular:      Rate and Rhythm: Normal rate and regular rhythm. Pulses: Normal pulses. Heart sounds: Normal heart sounds. No murmur. Pulmonary:      Effort: Pulmonary effort is normal.      Breath sounds: No wheezing, rhonchi or rales. Chest:      Chest wall: No tenderness. Abdominal:      General: Bowel sounds are normal. There is no distension. Palpations: Abdomen is soft. There is no mass. Tenderness: There is no abdominal tenderness. There is no right CVA tenderness, left CVA tenderness or guarding. Musculoskeletal: Normal range of motion. General: No swelling, deformity or signs of injury. Right lower leg: No edema. Left lower leg: No edema. Lymphadenopathy:      Cervical: No cervical adenopathy. Skin:     General: Skin is warm. Capillary Refill: Capillary refill takes less than 2 seconds. Findings: No erythema or rash. Neurological:      General: No focal deficit present. Mental Status: She is alert and oriented to person, place, and time. Sensory: No sensory deficit.       Comments: Mild bilateral hand tremor   Psychiatric:         Mood and Affect: Mood normal.         Behavior: Behavior normal.       EKG: NSR, normal ECG     /Plan:   Linnea Stephens was seen today for pre-op exam.    Diagnoses and all orders for this visit:    Pre-op exam  -     EKG 12 Lead    Hyperthyroidism          Pre-Operative Risk assessment using 2014 ACC/AHA guidelines     Emergent procedure No  Active Cardiac Condition No (decompensated HF, Arrhythmia, MI <3 weeks, severe valve disease)  Risk Level of Procedure Intermediate Risk (intraperitoneal, intrathoracic, HENT, orthopedic, or carotid endarterectomy, etc.)  Revised Cardiac Risk Index Risk factors: None  Measurement of Exercise Tolerance before Surgery >4 Yes    According to the 2014 ACC/AHA pre-operative risk assessment guidelines Milady Petit is a low risk for major cardiac complications during a intermediate risk procedure and may continue as planned. Specific medication recommendations are listed below. Medications recommended to continue should be taken with a sip of water even when NPO. Further recommendations from consultants: None      1. Preoperative workup as follows: per ENT  2. Change in medicationregimen before surgery: none  3. Prophylaxis for cardiac events with perioperative beta-blockers:  Continue [place]  4. Deep vein thrombosis prophylaxis:  Per ENT. Encouraged early ambulation.     This form will be routed to Dr Karen Montalvo,  Dr Kayla Villagran, and Dr Tino Esparza M.D.   6/8/2020, 9:42 AM

## 2020-06-22 NOTE — PROGRESS NOTES
PRE-OP INSTRUCTIONS FOR THE SURGICAL PATIENT YOU ARE UNABLE TO MAKE CONTACT FOR AN INTERVIEW:      1. Follow instructions for your ARRIVAL TIME as DIRECTED BY YOUR SURGEON. 2. Enter the MAIN entrance located on [address] and report to the desk. 3. Bring your insurance & prescription card and photo ID with you. You may also be asked to pay a co-pay, as you may want to bring a check or credit card with you. 4. Leave all other valuables at home. 5. Arrange for someone to drive you home and be with you for the first 24 hours after discharge. 6. Bring your medication list with you day of surgery with doses and frequency listed  7. You must contact your surgeon for Instructions regarding:         - ALL medication instructions, especially if taking blood thinners, aspirin, or diabetic medication.  - IF  there is a change in your physical condition such as a cold, fever, rash, cuts, sores or any other infection, especially near your surgical site. 8. A Pre-op History and Physical for surgery MUST be completed by your Physician or an Urgent Care within 30 days of your procedure date. Please bring a copy with you on the day of your procedure and along with any other testing performed. 9. DO NOT EAT ANYTHING eight hours prior to surgery. May have up to 8 ounces of water 4 hours prior to surgery. 10. No gum, candy, mints, or ice chips day of procedure. 11. Please refrain from drinking alcohol the day before or day of your procedure. 12. Please do not smoke the day of your procedure. 13. Dress in loose, comfortable clothing appropriate for redressing after your procedure. Do not wear jewelry (including body piercings), make-up, fingernail polish, lotion, powders or metal hairclips. 15. Contacts will need to be removed prior to surgery. You may want to bring your            Eye glasses to wear immediately before and after surgery.   14. Dentures will need to be removed before your

## 2020-06-23 ENCOUNTER — OFFICE VISIT (OUTPATIENT)
Dept: PRIMARY CARE CLINIC | Age: 55
End: 2020-06-23
Payer: COMMERCIAL

## 2020-06-23 PROCEDURE — G8417 CALC BMI ABV UP PARAM F/U: HCPCS | Performed by: NURSE PRACTITIONER

## 2020-06-23 PROCEDURE — 99211 OFF/OP EST MAY X REQ PHY/QHP: CPT | Performed by: NURSE PRACTITIONER

## 2020-06-23 PROCEDURE — G8428 CUR MEDS NOT DOCUMENT: HCPCS | Performed by: NURSE PRACTITIONER

## 2020-06-25 ENCOUNTER — TELEPHONE (OUTPATIENT)
Dept: ENT CLINIC | Age: 55
End: 2020-06-25

## 2020-06-26 ENCOUNTER — ANESTHESIA EVENT (OUTPATIENT)
Dept: OPERATING ROOM | Age: 55
End: 2020-06-26
Payer: COMMERCIAL

## 2020-06-26 LAB
SARS-COV-2: NOT DETECTED
SOURCE: NORMAL

## 2020-06-29 ENCOUNTER — ANESTHESIA (OUTPATIENT)
Dept: OPERATING ROOM | Age: 55
End: 2020-06-29
Payer: COMMERCIAL

## 2020-06-29 ENCOUNTER — HOSPITAL ENCOUNTER (OUTPATIENT)
Age: 55
Setting detail: OBSERVATION
Discharge: HOME | End: 2020-06-30
Attending: OTOLARYNGOLOGY | Admitting: OTOLARYNGOLOGY
Payer: COMMERCIAL

## 2020-06-29 VITALS — SYSTOLIC BLOOD PRESSURE: 133 MMHG | OXYGEN SATURATION: 98 % | TEMPERATURE: 94.8 F | DIASTOLIC BLOOD PRESSURE: 62 MMHG

## 2020-06-29 PROBLEM — E05.00 GRAVES DISEASE: Status: ACTIVE | Noted: 2020-06-29

## 2020-06-29 LAB
GLUCOSE BLD-MCNC: 143 MG/DL (ref 70–99)
PARATHYROID HORMONE INTACT: 19.1 PG/ML (ref 14–72)
PERFORMED ON: ABNORMAL
PTH INTACT: 19.2 PG/ML (ref 14–72)
PTH INTACT: 35.8 PG/ML (ref 14–72)

## 2020-06-29 PROCEDURE — 94150 VITAL CAPACITY TEST: CPT

## 2020-06-29 PROCEDURE — 7100000000 HC PACU RECOVERY - FIRST 15 MIN: Performed by: OTOLARYNGOLOGY

## 2020-06-29 PROCEDURE — 6360000002 HC RX W HCPCS: Performed by: STUDENT IN AN ORGANIZED HEALTH CARE EDUCATION/TRAINING PROGRAM

## 2020-06-29 PROCEDURE — 3700000001 HC ADD 15 MINUTES (ANESTHESIA): Performed by: OTOLARYNGOLOGY

## 2020-06-29 PROCEDURE — 36415 COLL VENOUS BLD VENIPUNCTURE: CPT

## 2020-06-29 PROCEDURE — 6360000002 HC RX W HCPCS: Performed by: NURSE ANESTHETIST, CERTIFIED REGISTERED

## 2020-06-29 PROCEDURE — 2580000003 HC RX 258: Performed by: STUDENT IN AN ORGANIZED HEALTH CARE EDUCATION/TRAINING PROGRAM

## 2020-06-29 PROCEDURE — 60240 REMOVAL OF THYROID: CPT | Performed by: OTOLARYNGOLOGY

## 2020-06-29 PROCEDURE — G0378 HOSPITAL OBSERVATION PER HR: HCPCS

## 2020-06-29 PROCEDURE — 2709999900 HC NON-CHARGEABLE SUPPLY: Performed by: OTOLARYNGOLOGY

## 2020-06-29 PROCEDURE — 3600000014 HC SURGERY LEVEL 4 ADDTL 15MIN: Performed by: OTOLARYNGOLOGY

## 2020-06-29 PROCEDURE — 2720000010 HC SURG SUPPLY STERILE: Performed by: OTOLARYNGOLOGY

## 2020-06-29 PROCEDURE — 7100000001 HC PACU RECOVERY - ADDTL 15 MIN: Performed by: OTOLARYNGOLOGY

## 2020-06-29 PROCEDURE — 3600000004 HC SURGERY LEVEL 4 BASE: Performed by: OTOLARYNGOLOGY

## 2020-06-29 PROCEDURE — 2500000003 HC RX 250 WO HCPCS: Performed by: OTOLARYNGOLOGY

## 2020-06-29 PROCEDURE — 94799 UNLISTED PULMONARY SVC/PX: CPT

## 2020-06-29 PROCEDURE — 2580000003 HC RX 258: Performed by: ANESTHESIOLOGY

## 2020-06-29 PROCEDURE — 2700000000 HC OXYGEN THERAPY PER DAY

## 2020-06-29 PROCEDURE — 3700000000 HC ANESTHESIA ATTENDED CARE: Performed by: OTOLARYNGOLOGY

## 2020-06-29 PROCEDURE — 83970 ASSAY OF PARATHORMONE: CPT

## 2020-06-29 PROCEDURE — 94761 N-INVAS EAR/PLS OXIMETRY MLT: CPT

## 2020-06-29 PROCEDURE — 2580000003 HC RX 258: Performed by: OTOLARYNGOLOGY

## 2020-06-29 PROCEDURE — 2500000003 HC RX 250 WO HCPCS: Performed by: NURSE ANESTHETIST, CERTIFIED REGISTERED

## 2020-06-29 PROCEDURE — 88307 TISSUE EXAM BY PATHOLOGIST: CPT

## 2020-06-29 RX ORDER — FENTANYL CITRATE 50 UG/ML
25 INJECTION, SOLUTION INTRAMUSCULAR; INTRAVENOUS EVERY 5 MIN PRN
Status: DISCONTINUED | OUTPATIENT
Start: 2020-06-29 | End: 2020-06-29

## 2020-06-29 RX ORDER — LIDOCAINE HYDROCHLORIDE 20 MG/ML
INJECTION, SOLUTION INTRAVENOUS PRN
Status: DISCONTINUED | OUTPATIENT
Start: 2020-06-29 | End: 2020-06-29 | Stop reason: SDUPTHER

## 2020-06-29 RX ORDER — CEFAZOLIN SODIUM 1 G/3ML
INJECTION, POWDER, FOR SOLUTION INTRAMUSCULAR; INTRAVENOUS PRN
Status: DISCONTINUED | OUTPATIENT
Start: 2020-06-29 | End: 2020-06-29 | Stop reason: SDUPTHER

## 2020-06-29 RX ORDER — LABETALOL 20 MG/4 ML (5 MG/ML) INTRAVENOUS SYRINGE
5 EVERY 10 MIN PRN
Status: DISCONTINUED | OUTPATIENT
Start: 2020-06-29 | End: 2020-06-29

## 2020-06-29 RX ORDER — LIDOCAINE HYDROCHLORIDE 10 MG/ML
1 INJECTION, SOLUTION EPIDURAL; INFILTRATION; INTRACAUDAL; PERINEURAL
Status: DISCONTINUED | OUTPATIENT
Start: 2020-06-29 | End: 2020-06-29

## 2020-06-29 RX ORDER — MIDAZOLAM HYDROCHLORIDE 1 MG/ML
INJECTION INTRAMUSCULAR; INTRAVENOUS PRN
Status: DISCONTINUED | OUTPATIENT
Start: 2020-06-29 | End: 2020-06-29 | Stop reason: SDUPTHER

## 2020-06-29 RX ORDER — SODIUM CHLORIDE 0.9 % (FLUSH) 0.9 %
10 SYRINGE (ML) INJECTION PRN
Status: DISCONTINUED | OUTPATIENT
Start: 2020-06-29 | End: 2020-06-29

## 2020-06-29 RX ORDER — PROPOFOL 10 MG/ML
INJECTION, EMULSION INTRAVENOUS PRN
Status: DISCONTINUED | OUTPATIENT
Start: 2020-06-29 | End: 2020-06-29 | Stop reason: SDUPTHER

## 2020-06-29 RX ORDER — SODIUM CHLORIDE 0.9 % (FLUSH) 0.9 %
10 SYRINGE (ML) INJECTION PRN
Status: DISCONTINUED | OUTPATIENT
Start: 2020-06-29 | End: 2020-06-30 | Stop reason: HOSPADM

## 2020-06-29 RX ORDER — ONDANSETRON 2 MG/ML
4 INJECTION INTRAMUSCULAR; INTRAVENOUS
Status: DISCONTINUED | OUTPATIENT
Start: 2020-06-29 | End: 2020-06-29

## 2020-06-29 RX ORDER — DEXAMETHASONE SODIUM PHOSPHATE 4 MG/ML
INJECTION, SOLUTION INTRA-ARTICULAR; INTRALESIONAL; INTRAMUSCULAR; INTRAVENOUS; SOFT TISSUE PRN
Status: DISCONTINUED | OUTPATIENT
Start: 2020-06-29 | End: 2020-06-29 | Stop reason: SDUPTHER

## 2020-06-29 RX ORDER — HYDROMORPHONE HCL 110MG/55ML
PATIENT CONTROLLED ANALGESIA SYRINGE INTRAVENOUS PRN
Status: DISCONTINUED | OUTPATIENT
Start: 2020-06-29 | End: 2020-06-29 | Stop reason: SDUPTHER

## 2020-06-29 RX ORDER — GLYCOPYRROLATE 0.2 MG/ML
0.2 INJECTION INTRAMUSCULAR; INTRAVENOUS ONCE
Status: DISCONTINUED | OUTPATIENT
Start: 2020-06-29 | End: 2020-06-29

## 2020-06-29 RX ORDER — SODIUM CHLORIDE 0.9 % (FLUSH) 0.9 %
10 SYRINGE (ML) INJECTION EVERY 12 HOURS SCHEDULED
Status: DISCONTINUED | OUTPATIENT
Start: 2020-06-29 | End: 2020-06-30 | Stop reason: HOSPADM

## 2020-06-29 RX ORDER — OXYCODONE HYDROCHLORIDE 5 MG/1
10 TABLET ORAL EVERY 4 HOURS PRN
Status: DISCONTINUED | OUTPATIENT
Start: 2020-06-29 | End: 2020-06-30 | Stop reason: HOSPADM

## 2020-06-29 RX ORDER — SODIUM CHLORIDE, SODIUM LACTATE, POTASSIUM CHLORIDE, CALCIUM CHLORIDE 600; 310; 30; 20 MG/100ML; MG/100ML; MG/100ML; MG/100ML
INJECTION, SOLUTION INTRAVENOUS CONTINUOUS
Status: DISCONTINUED | OUTPATIENT
Start: 2020-06-29 | End: 2020-06-29

## 2020-06-29 RX ORDER — ONDANSETRON 2 MG/ML
4 INJECTION INTRAMUSCULAR; INTRAVENOUS EVERY 6 HOURS PRN
Status: DISCONTINUED | OUTPATIENT
Start: 2020-06-29 | End: 2020-06-30 | Stop reason: HOSPADM

## 2020-06-29 RX ORDER — ONDANSETRON 2 MG/ML
INJECTION INTRAMUSCULAR; INTRAVENOUS
Status: DISCONTINUED
Start: 2020-06-29 | End: 2020-06-30

## 2020-06-29 RX ORDER — SODIUM CHLORIDE 0.9 % (FLUSH) 0.9 %
10 SYRINGE (ML) INJECTION EVERY 12 HOURS SCHEDULED
Status: DISCONTINUED | OUTPATIENT
Start: 2020-06-29 | End: 2020-06-29

## 2020-06-29 RX ORDER — FENTANYL CITRATE 50 UG/ML
50 INJECTION, SOLUTION INTRAMUSCULAR; INTRAVENOUS EVERY 5 MIN PRN
Status: DISCONTINUED | OUTPATIENT
Start: 2020-06-29 | End: 2020-06-29

## 2020-06-29 RX ORDER — SUCCINYLCHOLINE/SOD CL,ISO/PF 200MG/10ML
SYRINGE (ML) INTRAVENOUS PRN
Status: DISCONTINUED | OUTPATIENT
Start: 2020-06-29 | End: 2020-06-29 | Stop reason: SDUPTHER

## 2020-06-29 RX ORDER — ONDANSETRON 2 MG/ML
INJECTION INTRAMUSCULAR; INTRAVENOUS PRN
Status: DISCONTINUED | OUTPATIENT
Start: 2020-06-29 | End: 2020-06-29 | Stop reason: SDUPTHER

## 2020-06-29 RX ORDER — OXYCODONE HYDROCHLORIDE 5 MG/1
5 TABLET ORAL EVERY 4 HOURS PRN
Status: DISCONTINUED | OUTPATIENT
Start: 2020-06-29 | End: 2020-06-30 | Stop reason: HOSPADM

## 2020-06-29 RX ORDER — LIDOCAINE HYDROCHLORIDE AND EPINEPHRINE 10; 10 MG/ML; UG/ML
INJECTION, SOLUTION INFILTRATION; PERINEURAL PRN
Status: DISCONTINUED | OUTPATIENT
Start: 2020-06-29 | End: 2020-06-29 | Stop reason: ALTCHOICE

## 2020-06-29 RX ORDER — DEXAMETHASONE SODIUM PHOSPHATE 4 MG/ML
4 INJECTION, SOLUTION INTRA-ARTICULAR; INTRALESIONAL; INTRAMUSCULAR; INTRAVENOUS; SOFT TISSUE ONCE
Status: DISCONTINUED | OUTPATIENT
Start: 2020-06-29 | End: 2020-06-29

## 2020-06-29 RX ORDER — HYDRALAZINE HYDROCHLORIDE 20 MG/ML
5 INJECTION INTRAMUSCULAR; INTRAVENOUS EVERY 5 MIN PRN
Status: DISCONTINUED | OUTPATIENT
Start: 2020-06-29 | End: 2020-06-29

## 2020-06-29 RX ORDER — FENTANYL CITRATE 50 UG/ML
INJECTION, SOLUTION INTRAMUSCULAR; INTRAVENOUS PRN
Status: DISCONTINUED | OUTPATIENT
Start: 2020-06-29 | End: 2020-06-29 | Stop reason: SDUPTHER

## 2020-06-29 RX ORDER — SODIUM CHLORIDE, SODIUM LACTATE, POTASSIUM CHLORIDE, CALCIUM CHLORIDE 600; 310; 30; 20 MG/100ML; MG/100ML; MG/100ML; MG/100ML
INJECTION, SOLUTION INTRAVENOUS CONTINUOUS
Status: DISCONTINUED | OUTPATIENT
Start: 2020-06-29 | End: 2020-06-30

## 2020-06-29 RX ORDER — ENALAPRILAT 2.5 MG/2ML
1.25 INJECTION INTRAVENOUS
Status: DISCONTINUED | OUTPATIENT
Start: 2020-06-29 | End: 2020-06-29

## 2020-06-29 RX ADMIN — HYDROMORPHONE HYDROCHLORIDE 1 MG: 1 INJECTION, SOLUTION INTRAMUSCULAR; INTRAVENOUS; SUBCUTANEOUS at 16:19

## 2020-06-29 RX ADMIN — PROPOFOL 100 MG: 10 INJECTION, EMULSION INTRAVENOUS at 08:02

## 2020-06-29 RX ADMIN — PROPOFOL 80 MG: 10 INJECTION, EMULSION INTRAVENOUS at 09:53

## 2020-06-29 RX ADMIN — SODIUM CHLORIDE, SODIUM LACTATE, POTASSIUM CHLORIDE, AND CALCIUM CHLORIDE: 600; 310; 30; 20 INJECTION, SOLUTION INTRAVENOUS at 08:40

## 2020-06-29 RX ADMIN — PROPOFOL 200 MG: 10 INJECTION, EMULSION INTRAVENOUS at 07:35

## 2020-06-29 RX ADMIN — PROPOFOL 70 MG: 10 INJECTION, EMULSION INTRAVENOUS at 09:44

## 2020-06-29 RX ADMIN — Medication 80 MG: at 07:36

## 2020-06-29 RX ADMIN — HYDROMORPHONE HYDROCHLORIDE 0.2 MG: 2 INJECTION, SOLUTION INTRAMUSCULAR; INTRAVENOUS; SUBCUTANEOUS at 09:32

## 2020-06-29 RX ADMIN — SODIUM CHLORIDE, POTASSIUM CHLORIDE, SODIUM LACTATE AND CALCIUM CHLORIDE: 600; 310; 30; 20 INJECTION, SOLUTION INTRAVENOUS at 16:20

## 2020-06-29 RX ADMIN — HYDROMORPHONE HYDROCHLORIDE 0.5 MG: 2 INJECTION, SOLUTION INTRAMUSCULAR; INTRAVENOUS; SUBCUTANEOUS at 08:02

## 2020-06-29 RX ADMIN — LIDOCAINE HYDROCHLORIDE 80 MG: 20 INJECTION, SOLUTION INTRAVENOUS at 07:35

## 2020-06-29 RX ADMIN — SODIUM CHLORIDE, POTASSIUM CHLORIDE, SODIUM LACTATE AND CALCIUM CHLORIDE: 600; 310; 30; 20 INJECTION, SOLUTION INTRAVENOUS at 13:01

## 2020-06-29 RX ADMIN — SODIUM CHLORIDE, SODIUM LACTATE, POTASSIUM CHLORIDE, AND CALCIUM CHLORIDE: 600; 310; 30; 20 INJECTION, SOLUTION INTRAVENOUS at 06:38

## 2020-06-29 RX ADMIN — PROPOFOL 50 MG: 10 INJECTION, EMULSION INTRAVENOUS at 07:41

## 2020-06-29 RX ADMIN — CEFAZOLIN SODIUM 2000 MG: 1 POWDER, FOR SOLUTION INTRAMUSCULAR; INTRAVENOUS at 07:40

## 2020-06-29 RX ADMIN — PROPOFOL 50 MG: 10 INJECTION, EMULSION INTRAVENOUS at 09:32

## 2020-06-29 RX ADMIN — ONDANSETRON 4 MG: 2 INJECTION INTRAMUSCULAR; INTRAVENOUS at 09:20

## 2020-06-29 RX ADMIN — DEXAMETHASONE SODIUM PHOSPHATE 4 MG: 4 INJECTION, SOLUTION INTRAMUSCULAR; INTRAVENOUS at 09:20

## 2020-06-29 RX ADMIN — ONDANSETRON 4 MG: 2 INJECTION INTRAMUSCULAR; INTRAVENOUS at 13:09

## 2020-06-29 RX ADMIN — FENTANYL CITRATE 50 MCG: 50 INJECTION INTRAMUSCULAR; INTRAVENOUS at 07:35

## 2020-06-29 RX ADMIN — MIDAZOLAM HYDROCHLORIDE 2 MG: 2 INJECTION, SOLUTION INTRAMUSCULAR; INTRAVENOUS at 07:20

## 2020-06-29 RX ADMIN — HYDROMORPHONE HYDROCHLORIDE 1 MG: 2 INJECTION, SOLUTION INTRAMUSCULAR; INTRAVENOUS; SUBCUTANEOUS at 07:41

## 2020-06-29 RX ADMIN — FENTANYL CITRATE 50 MCG: 50 INJECTION INTRAMUSCULAR; INTRAVENOUS at 10:02

## 2020-06-29 RX ADMIN — HYDROMORPHONE HYDROCHLORIDE 0.3 MG: 2 INJECTION, SOLUTION INTRAMUSCULAR; INTRAVENOUS; SUBCUTANEOUS at 09:44

## 2020-06-29 RX ADMIN — SODIUM CHLORIDE, SODIUM LACTATE, POTASSIUM CHLORIDE, AND CALCIUM CHLORIDE: 600; 310; 30; 20 INJECTION, SOLUTION INTRAVENOUS at 07:25

## 2020-06-29 RX ADMIN — FENTANYL CITRATE 50 MCG: 50 INJECTION INTRAMUSCULAR; INTRAVENOUS at 07:20

## 2020-06-29 RX ADMIN — PROPOFOL 50 MG: 10 INJECTION, EMULSION INTRAVENOUS at 07:46

## 2020-06-29 ASSESSMENT — PULMONARY FUNCTION TESTS
PIF_VALUE: 19
PIF_VALUE: 16
PIF_VALUE: 21
PIF_VALUE: 20
PIF_VALUE: 21
PIF_VALUE: 19
PIF_VALUE: 21
PIF_VALUE: 14
PIF_VALUE: 20
PIF_VALUE: 21
PIF_VALUE: 4
PIF_VALUE: 3
PIF_VALUE: 16
PIF_VALUE: 20
PIF_VALUE: 20
PIF_VALUE: 21
PIF_VALUE: 22
PIF_VALUE: 20
PIF_VALUE: 20
PIF_VALUE: 21
PIF_VALUE: 21
PIF_VALUE: 32
PIF_VALUE: 21
PIF_VALUE: 20
PIF_VALUE: 23
PIF_VALUE: 21
PIF_VALUE: 16
PIF_VALUE: 21
PIF_VALUE: 20
PIF_VALUE: 21
PIF_VALUE: 16
PIF_VALUE: 21
PIF_VALUE: 20
PIF_VALUE: 16
PIF_VALUE: 20
PIF_VALUE: 20
PIF_VALUE: 25
PIF_VALUE: 1
PIF_VALUE: 16
PIF_VALUE: 15
PIF_VALUE: 21
PIF_VALUE: 20
PIF_VALUE: 22
PIF_VALUE: 20
PIF_VALUE: 14
PIF_VALUE: 2
PIF_VALUE: 21
PIF_VALUE: 20
PIF_VALUE: 2
PIF_VALUE: 20
PIF_VALUE: 20
PIF_VALUE: 21
PIF_VALUE: 21
PIF_VALUE: 20
PIF_VALUE: 20
PIF_VALUE: 21
PIF_VALUE: 16
PIF_VALUE: 20
PIF_VALUE: 19
PIF_VALUE: 21
PIF_VALUE: 20
PIF_VALUE: 21
PIF_VALUE: 21
PIF_VALUE: 20
PIF_VALUE: 16
PIF_VALUE: 21
PIF_VALUE: 20
PIF_VALUE: 20
PIF_VALUE: 1
PIF_VALUE: 30
PIF_VALUE: 20
PIF_VALUE: 17
PIF_VALUE: 16
PIF_VALUE: 22
PIF_VALUE: 15
PIF_VALUE: 20
PIF_VALUE: 21
PIF_VALUE: 2
PIF_VALUE: 21
PIF_VALUE: 3
PIF_VALUE: 20
PIF_VALUE: 21
PIF_VALUE: 20
PIF_VALUE: 20
PIF_VALUE: 21
PIF_VALUE: 21
PIF_VALUE: 20
PIF_VALUE: 21
PIF_VALUE: 20
PIF_VALUE: 21
PIF_VALUE: 20
PIF_VALUE: 21
PIF_VALUE: 22
PIF_VALUE: 21
PIF_VALUE: 15
PIF_VALUE: 32
PIF_VALUE: 2
PIF_VALUE: 21
PIF_VALUE: 20
PIF_VALUE: 3
PIF_VALUE: 20
PIF_VALUE: 33
PIF_VALUE: 21
PIF_VALUE: 20
PIF_VALUE: 20
PIF_VALUE: 21
PIF_VALUE: 21
PIF_VALUE: 16
PIF_VALUE: 20
PIF_VALUE: 20
PIF_VALUE: 21
PIF_VALUE: 20
PIF_VALUE: 21
PIF_VALUE: 20
PIF_VALUE: 21
PIF_VALUE: 0
PIF_VALUE: 20
PIF_VALUE: 21
PIF_VALUE: 20
PIF_VALUE: 21
PIF_VALUE: 21
PIF_VALUE: 20
PIF_VALUE: 0
PIF_VALUE: 21
PIF_VALUE: 20
PIF_VALUE: 0
PIF_VALUE: 20
PIF_VALUE: 16
PIF_VALUE: 20
PIF_VALUE: 20
PIF_VALUE: 16
PIF_VALUE: 20
PIF_VALUE: 5
PIF_VALUE: 22
PIF_VALUE: 16
PIF_VALUE: 20
PIF_VALUE: 21
PIF_VALUE: 21
PIF_VALUE: 14
PIF_VALUE: 16
PIF_VALUE: 20
PIF_VALUE: 21
PIF_VALUE: 14

## 2020-06-29 ASSESSMENT — PAIN SCALES - GENERAL
PAINLEVEL_OUTOF10: 0
PAINLEVEL_OUTOF10: 2
PAINLEVEL_OUTOF10: 7
PAINLEVEL_OUTOF10: 0
PAINLEVEL_OUTOF10: 0

## 2020-06-29 ASSESSMENT — PAIN - FUNCTIONAL ASSESSMENT: PAIN_FUNCTIONAL_ASSESSMENT: 0-10

## 2020-06-29 NOTE — ANESTHESIA PRE PROCEDURE
Department of Anesthesiology  Preprocedure Note       Name:  Bel Bermeo   Age:  53 y.o.  :  1965                                          MRN:  [id number]         Date:  2020      Surgeon: Alex Eden):  Elsi Chau DO    Procedure: Procedure(s):  TOTAL THYROIDECTOMY    Medications prior to admission:   Prior to Admission medications    Medication Sig Start Date End Date Taking?  Authorizing Provider   atenolol (TENORMIN) 25 MG tablet TAKE 1 TABLET BY MOUTH  DAILY 20  Yes Arielle Carrero MD       Current medications:    Current Facility-Administered Medications   Medication Dose Route Frequency Provider Last Rate Last Dose    lactated ringers infusion   Intravenous Continuous Phil Patel  mL/hr at 20 9751      lactated ringers infusion   Intravenous Continuous Charissa Otoole MD        sodium chloride flush 0.9 % injection 10 mL  10 mL Intravenous 2 times per day Charissa Otoole MD        sodium chloride flush 0.9 % injection 10 mL  10 mL Intravenous PRN Charissa Otoole MD        lidocaine PF 1 % injection 1 mL  1 mL Intradermal Once PRN Charissa Otoole MD        glycopyrrolate (ROBINUL) injection 0.2 mg  0.2 mg Intravenous Once Charissa Otoole MD        dexamethasone (DECADRON) injection 4 mg  4 mg Intravenous Once Charissa Otoole MD           Allergies:  No Known Allergies    Problem List:    Patient Active Problem List   Diagnosis Code    Acute back pain M54.9    Hyperthyroidism E05.90    Goiter E04.9    Thyroid nodule E04.1    Class 1 obesity with body mass index (BMI) of 31.0 to 31.9 in adult E66.9, Z68.31    Smoking F17.200       Past Medical History:        Diagnosis Date    Hypertension     Hyperthyroidism        Past Surgical History:        Procedure Laterality Date    CHOLECYSTECTOMY      HYSTERECTOMY      TUBAL LIGATION         Social History:    Social History     Tobacco Use    Smoking status: Current Every Day Smoker     Packs/day: found for: PHART, PO2ART, PZK9UYV, YBA2WHI, BEART, D8IKVIVK     Type & Screen (If Applicable):  No results found for: LABABO, LABRH    Drug/Infectious Status (If Applicable):  No results found for: HIV, HEPCAB    COVID-19 Screening (If Applicable):   Lab Results   Component Value Date    COVID19 Not Detected 06/23/2020         Anesthesia Evaluation  Patient summary reviewed no history of anesthetic complications:   Airway: Mallampati: II  TM distance: >3 FB   Neck ROM: full  Mouth opening: > = 3 FB Dental:          Pulmonary:Negative Pulmonary ROS                              Cardiovascular:        Dysrhythmias: hx of tachycardia                 Neuro/Psych:   Negative Neuro/Psych ROS              GI/Hepatic/Renal: Neg GI/Hepatic/Renal ROS            Endo/Other:    (+) hyperthyroidism::., .                 Abdominal:           Vascular:                                        Anesthesia Plan      general     ASA 2       Induction: intravenous. Anesthetic plan and risks discussed with patient.                       Kamini Padron MD   6/29/2020

## 2020-06-29 NOTE — PROGRESS NOTES
PACU Transfer Note    Vitals:    06/29/20 1154   BP: (!) 151/68   Pulse: 98   Resp: 18   Temp: 97.6 °F (36.4 °C)   SpO2: 99%       In: 1615 [I.V.:1615]  Out: 50     Pain assessment:  none  Pain Level: 0    Report given to Receiving unit RN. Patient meets charissa discharge criteria from PACU. All belongings sent with patient to inpatient unit. Patient's  informed that patient is on her way to inpatient room. No further changes.      6/29/2020 11:59 AM

## 2020-06-29 NOTE — BRIEF OP NOTE
Brief Postoperative Note      Patient: Radha Costello  YOB: 1965  MRN: [id number]    Date of Procedure: 6/29/2020    Pre-Op Diagnosis: Hyperthyroidism [E05.90] Graves disease [E05.00]    Post-Op Diagnosis: Same       Procedure(s):  TOTAL THYROIDECTOMY    Surgeon(s):  Vern Noonan DO    Assistant:  Surgical Assistant: Andrew Cat  First Assistant: Lisandro Birch DO PGY3    Anesthesia: General    Estimated Blood Loss (mL): 50    Complications: None    Specimens:   ID Type Source Tests Collected by Time Destination   1 : PRE-OP RAPID PTH Blood Blood PTH, INTRAOPERATIVE Vern Noonan DO 6/29/2020 0739    2 : INTRA-OP PTH Blood Blood PTH, INTRAOPERATIVE Vern Noonan DO 6/29/2020 0644    A : TOTAL THYROID Tissue Tissue SURGICAL PATHOLOGY Vern Noonan DO 6/29/2020 1165        Implants:  * No implants in log *      Drains: * No LDAs found *    Findings: Thyroid removed in entirety. Recurrent laryngeal nerves identified bilaterally.      Electronically signed by Lisandro Birch DO on 6/29/2020 at 10:18 AM

## 2020-06-29 NOTE — PROGRESS NOTES
Pt oriented to room and call light. Both pt and  stated understanding. Ambulated to bathroom twice, still lightly weak from OR medications. Pt has lost balance once when getting up from bed but was able to re-balance self with no issues. Nurse completed education regarding assistance when getting up. Pt and  stated understanding. Call light is within reach.  continues at bedside at this time and stated will call if needs arise. Surgical area to neck is open to air, clean, dry and intact with surgical glue noted.   Electronically signed by Crystal Isaacs RN on 6/29/2020 at 5:03 PM

## 2020-06-29 NOTE — H&P
(M25.551) Hip pain, right  (primary encounter diagnosis)  Comment: We will request X-ray of the lumbar spine and hip today and plan to resume chiropractic pain.  If not improving with chiropractic then we will plan to follow up in orthopedics   Plan: XR Lumbar Spine 2/3 Views, XR Pelvis and Hip         Right 1 View            (M48.02) Cervical stenosis of spinal canal  Comment: OK to use flexeril   Plan: cyclobenzaprine (FLEXERIL) 10 MG tablet              Fear of current or ex partner: None     Emotionally abused: None     Physically abused: None     Forced sexual activity: None   Other Topics Concern    None   Social History Narrative    None         Medications Prior to Admission:      Prior to Admission medications    Medication Sig Start Date End Date Taking? Authorizing Provider   atenolol (TENORMIN) 25 MG tablet TAKE 1 TABLET BY MOUTH  DAILY 5/4/20  Yes Gisel Danielle MD         Allergies:  Patient has no known allergies. PHYSICAL EXAM:      /75   Pulse 91   Temp 98.1 °F (36.7 °C)   Resp 16   Ht 5' 7\" (1.702 m)   Wt 170 lb (77.1 kg)   SpO2 95%   BMI 26.63 kg/m²      Airway:  Airway patent with no audible stridor    Heart:  regular rate and rhythm, No murmur noted    Lungs:  No increased work of breathing, good air exchange, clear to auscultation bilaterally, no crackles or wheezing    Abdomen:  soft, non-distended, non-tender, no rebound tenderness or guarding, normal active bowel sounds and no masses palpated    ASSESSMENT AND PLAN     Patient is a 53 y.o. female with above specified procedure planned. 1.  Patient seen and focused exam done today- no new changes since last physical exam on 6/8/20    2. Access to ancillary services are available per request of the provider.     MARY Young - CNP     6/29/2020

## 2020-06-29 NOTE — OP NOTE
Self-retaining retractors were inserted into the incision. The midline raphae was identified and Bovie electrocautery was used to dissect down and split the raphae. Then blunt combination of blunt dissection and harmonic scalpel device was used to split the strap muscles. The thyroid gland was then exposed. We then began on the right side. Again using combination of blunt dissection and the harmonic scalpel, the strap muscles were dissected free from the anterior, lateral and superior portions of the thyroid gland. Thyroid gland was found to be enlarged on the right side. We traced the superior pole. We then identified the superior pole vessels. These were ligated using the harmonic scalpel. At this time then the right portion of the gland was delivered through the incision. We then rolled the gland medially and the undersurface was exposed. The middle thyroid vein was then dissected free and ligated using the harmonic scalpel. We continued with blunt dissection using Maria Ines nerve dissector. As well as bipolar electrocautery. We then dissected and identified the right recurrent laryngeal nerve. This was confirmed with Nims stimulation. We then dissected the nerve free from the attaching fascial tissue to the thyroid lobe. This was done using the [facility] nerve dissector, bipolar electrocautery, and a Metzenbaum scissor. Once the nerve was freed and then Bovie electrocautery was used on the undersurface to detach Hugh's ligament from the thyroid and trachea. Attention was then turned to the left side and the same procedure was performed with similar findings. Once both lobes were freed in both recurrent laryngeal nerves were identified and intact, Bovie electrocautery was used to separate to the thyroid isthmus from the trachea. It was removed en bloc. At this time any residual bleeding was then controlled with bipolar electrocautery.   The surgical bed was then irrigated and suctioned with bacitracin impregnated sterile saline solution. A Valsalva was performed. There was no residual bleeding. Surgicel was placed in the surgical bed bilaterally. Strap muscles were then closed loosely with a figure-of-eight chromic 3-0 chromic suture. The dismal layer was then closed with a 3-0 chromic buried simple erupted suture. Subcutaneous deep dermal layer was closed with a 4-0 chromic simple erupted suture. The skin was closed with Dermabond. This concluded my portion of procedure. The patient was then turned back over the anesthesia team.  She was taken off anesthesia, extubated and transferred to PACU in stable condition. There were no complications with the procedure. The patient tolerated the procedure well.       Electronically signed by Alexei Mcnamara DO on 6/29/2020 at 10:18 AM

## 2020-06-29 NOTE — PROGRESS NOTES
Otolaryngology Surgery Post-op Note    CC: graves disease    Procedure(s) Performed: total thyroidecomy    Subjective:   Complains some pain that feels like sore throat, denies nausea or vomiting. Voiding. Endorses minimal dysphonia, no dysphagia or difficulty breathing. Denies perioral or digital numbness or tingling. Denies muscle cramp. Objective:  Anesthesia type: General      I/O    Intra op    Post op     Fluids  1000 mL 615 mL     EBL 50 mL 0 mL     Urine 0 mL 400 mL     Exam:  Vitals:    06/29/20 1145 06/29/20 1154 06/29/20 1228 06/29/20 1312   BP: (!) 167/66 (!) 151/68 (!) 152/78    Pulse: 95 98 93    Resp: 15 18 16    Temp:  97.6 °F (36.4 °C) 97.8 °F (36.6 °C)    TempSrc:  Temporal Oral    SpO2: 97% 99% 98% 98%   Weight:       Height:           General appearance: alert, no acute distress, grooming appropriate  HEENT: incision c/d/i without fluid collection, hematoma, or skin color changes. Chest/Lungs: CTAB, no crackles/rales, wheezes/rhonchi, normal effort  Cardiovascular: RRR, no murmurs/gallops/rubs  Abdomen: soft, non-tender, non-distended  Extremities: no edema, no cyanosis  Neuro: A&Ox3, no focal deficits, sensation intact,     Post op labs:   PTH: pending    Assessment and Plan  This is a 53y.o. year old female s/p total thyroidectomy POD #0    Pain management: scheduled tylenol, PRN oxycodone, dilaudid  Cardiovascular: continue to monitor  Respiratory: extubated, encourage hourly incentive spirometry and deep breathing  FEN:  Fluids: LR 75, Diet: CLD  : Monitor urine output  Wound: local care  Ambulation: OOB to chair, encourage ambulation  Prophylaxis: SCDs only  - Follow up post op PTH. Intraop PTH was 19.2 from 35.8 preop.     Coleman Nicole MD  General Surgery Resident  06/29/20 2:25 PM   [phone]

## 2020-06-30 VITALS
HEIGHT: 67 IN | HEART RATE: 83 BPM | BODY MASS INDEX: 26.68 KG/M2 | OXYGEN SATURATION: 96 % | TEMPERATURE: 99.3 F | WEIGHT: 170 LBS | RESPIRATION RATE: 18 BRPM | DIASTOLIC BLOOD PRESSURE: 72 MMHG | SYSTOLIC BLOOD PRESSURE: 120 MMHG

## 2020-06-30 LAB
ALBUMIN SERPL-MCNC: 3.6 G/DL (ref 3.4–5)
ANION GAP SERPL CALCULATED.3IONS-SCNC: 9 MMOL/L (ref 3–16)
BASOPHILS ABSOLUTE: 0 K/UL (ref 0–0.2)
BASOPHILS RELATIVE PERCENT: 0.1 %
BUN BLDV-MCNC: 13 MG/DL (ref 7–20)
CALCIUM IONIZED: 1.17 MMOL/L (ref 1.12–1.32)
CALCIUM SERPL-MCNC: 8.8 MG/DL (ref 8.3–10.6)
CHLORIDE BLD-SCNC: 106 MMOL/L (ref 99–110)
CO2: 27 MMOL/L (ref 21–32)
CREAT SERPL-MCNC: <0.5 MG/DL (ref 0.6–1.1)
EKG ATRIAL RATE: 109 BPM
EKG DIAGNOSIS: NORMAL
EKG P AXIS: 56 DEGREES
EKG P-R INTERVAL: 142 MS
EKG Q-T INTERVAL: 340 MS
EKG QRS DURATION: 90 MS
EKG QTC CALCULATION (BAZETT): 457 MS
EKG R AXIS: 70 DEGREES
EKG T AXIS: 57 DEGREES
EKG VENTRICULAR RATE: 109 BPM
EOSINOPHILS ABSOLUTE: 0 K/UL (ref 0–0.6)
EOSINOPHILS RELATIVE PERCENT: 0.1 %
GFR AFRICAN AMERICAN: >60
GFR NON-AFRICAN AMERICAN: >60
GLUCOSE BLD-MCNC: 138 MG/DL (ref 70–99)
HCT VFR BLD CALC: 39.3 % (ref 36–48)
HEMOGLOBIN: 12.8 G/DL (ref 12–16)
LYMPHOCYTES ABSOLUTE: 1.5 K/UL (ref 1–5.1)
LYMPHOCYTES RELATIVE PERCENT: 17.2 %
MAGNESIUM: 1.7 MG/DL (ref 1.8–2.4)
MCH RBC QN AUTO: 28.3 PG (ref 26–34)
MCHC RBC AUTO-ENTMCNC: 32.6 G/DL (ref 31–36)
MCV RBC AUTO: 87 FL (ref 80–100)
MONOCYTES ABSOLUTE: 1 K/UL (ref 0–1.3)
MONOCYTES RELATIVE PERCENT: 11.1 %
NEUTROPHILS ABSOLUTE: 6.1 K/UL (ref 1.7–7.7)
NEUTROPHILS RELATIVE PERCENT: 71.5 %
PDW BLD-RTO: 13 % (ref 12.4–15.4)
PH VENOUS: 7.32 (ref 7.35–7.45)
PHOSPHORUS: 5 MG/DL (ref 2.5–4.9)
PLATELET # BLD: 232 K/UL (ref 135–450)
PMV BLD AUTO: 9.7 FL (ref 5–10.5)
POTASSIUM SERPL-SCNC: 4.7 MMOL/L (ref 3.5–5.1)
RBC # BLD: 4.52 M/UL (ref 4–5.2)
SODIUM BLD-SCNC: 142 MMOL/L (ref 136–145)
TROPONIN: <0.01 NG/ML
WBC # BLD: 8.6 K/UL (ref 4–11)

## 2020-06-30 PROCEDURE — 6360000002 HC RX W HCPCS: Performed by: SURGERY

## 2020-06-30 PROCEDURE — G0378 HOSPITAL OBSERVATION PER HR: HCPCS

## 2020-06-30 PROCEDURE — 2580000003 HC RX 258: Performed by: STUDENT IN AN ORGANIZED HEALTH CARE EDUCATION/TRAINING PROGRAM

## 2020-06-30 PROCEDURE — 6370000000 HC RX 637 (ALT 250 FOR IP): Performed by: STUDENT IN AN ORGANIZED HEALTH CARE EDUCATION/TRAINING PROGRAM

## 2020-06-30 PROCEDURE — 80069 RENAL FUNCTION PANEL: CPT

## 2020-06-30 PROCEDURE — 93010 ELECTROCARDIOGRAM REPORT: CPT | Performed by: INTERNAL MEDICINE

## 2020-06-30 PROCEDURE — 84484 ASSAY OF TROPONIN QUANT: CPT

## 2020-06-30 PROCEDURE — 96365 THER/PROPH/DIAG IV INF INIT: CPT

## 2020-06-30 PROCEDURE — 36415 COLL VENOUS BLD VENIPUNCTURE: CPT

## 2020-06-30 PROCEDURE — 93005 ELECTROCARDIOGRAM TRACING: CPT | Performed by: NURSE PRACTITIONER

## 2020-06-30 PROCEDURE — 83735 ASSAY OF MAGNESIUM: CPT

## 2020-06-30 PROCEDURE — 85025 COMPLETE CBC W/AUTO DIFF WBC: CPT

## 2020-06-30 PROCEDURE — 96366 THER/PROPH/DIAG IV INF ADDON: CPT

## 2020-06-30 RX ORDER — MAGNESIUM SULFATE IN WATER 40 MG/ML
2 INJECTION, SOLUTION INTRAVENOUS ONCE
Status: COMPLETED | OUTPATIENT
Start: 2020-06-30 | End: 2020-06-30

## 2020-06-30 RX ORDER — OXYCODONE HYDROCHLORIDE 5 MG/1
5 TABLET ORAL EVERY 6 HOURS PRN
Qty: 20 TABLET | Refills: 0 | Status: SHIPPED | OUTPATIENT
Start: 2020-06-30 | End: 2020-07-05

## 2020-06-30 RX ORDER — DOCUSATE SODIUM 100 MG/1
100 CAPSULE, LIQUID FILLED ORAL 2 TIMES DAILY
Qty: 28 CAPSULE | Refills: 0 | Status: SHIPPED | OUTPATIENT
Start: 2020-06-30 | End: 2020-07-14

## 2020-06-30 RX ORDER — LEVOTHYROXINE SODIUM 0.05 MG/1
50 TABLET ORAL DAILY
Qty: 30 TABLET | Refills: 0 | Status: SHIPPED | OUTPATIENT
Start: 2020-06-30 | End: 2020-08-04 | Stop reason: SDUPTHER

## 2020-06-30 RX ORDER — ATENOLOL 25 MG/1
25 TABLET ORAL DAILY
Qty: 30 TABLET | Refills: 3
Start: 2020-06-30 | End: 2021-06-16

## 2020-06-30 RX ORDER — ATENOLOL 25 MG/1
25 TABLET ORAL DAILY
Status: DISCONTINUED | OUTPATIENT
Start: 2020-06-30 | End: 2020-06-30 | Stop reason: HOSPADM

## 2020-06-30 RX ORDER — LEVOTHYROXINE SODIUM 0.05 MG/1
50 TABLET ORAL DAILY
Status: DISCONTINUED | OUTPATIENT
Start: 2020-06-30 | End: 2020-06-30 | Stop reason: HOSPADM

## 2020-06-30 RX ADMIN — SODIUM CHLORIDE, POTASSIUM CHLORIDE, SODIUM LACTATE AND CALCIUM CHLORIDE: 600; 310; 30; 20 INJECTION, SOLUTION INTRAVENOUS at 05:34

## 2020-06-30 RX ADMIN — ATENOLOL 25 MG: 25 TABLET ORAL at 17:02

## 2020-06-30 RX ADMIN — MAGNESIUM SULFATE HEPTAHYDRATE 2 G: 40 INJECTION, SOLUTION INTRAVENOUS at 08:16

## 2020-06-30 RX ADMIN — OXYCODONE 10 MG: 5 TABLET ORAL at 01:18

## 2020-06-30 RX ADMIN — Medication 10 ML: at 08:12

## 2020-06-30 RX ADMIN — LEVOTHYROXINE SODIUM 50 MCG: 50 TABLET ORAL at 08:12

## 2020-06-30 ASSESSMENT — PAIN SCALES - GENERAL: PAINLEVEL_OUTOF10: 7

## 2020-06-30 NOTE — PLAN OF CARE
Problem: Falls - Risk of:  Goal: Will remain free from falls  Description: Will remain free from falls  Note: Pt low fall risk. A&O, VSS. Bed in lowest position, call light and belongings within reach, bed alarm on, room free from clutter. Continuing to hourly round for pt needs. Problem: Infection - Surgical Site:  Goal: Will show no infection signs and symptoms  Description: Will show no infection signs and symptoms  Note: Surgical site remains open to air-CDI. Skin is generally red all over, incision does not appear different from the rest of integument. VSS. Problem: Pain:  Goal: Control of acute pain  Description: Control of acute pain  Note: Did experience an increase in pain throughout the night. PRN oxycodone given. Reinforcing pt education r/t appropriate pain management and expectations. Continuing to monitor for changes in status and for the need to adjust interventions.

## 2020-06-30 NOTE — PROGRESS NOTES
Notified by nursing staff that patient became tachycardic to 120 and was c/o SOB. Upon entering the room the patient was ambulating back from the bathroom to the bed. Stated her SOB has improved. She also stated that she has a history of intermittent tachycardia. Re-checked vitals showed HR of 100, 's/79, O2 sat of 93% on RA. Physical exam  Lungs: CTAB, diminished breath sounds at bases B/L, no wheezing, no crackles   Heart: tachy, +murmur   Neck: surgical incision is c/d/i, stable post op swelling at surgical site since this AM    - Will obtain EKG and troponin, as long as both are WNL ok to dc patient. - She will need to follow up as an outpatient for heart murmur with her primary care   - Plan discussed with Dr. Valeria Ngo. Glenis Duke MD   General Surgery Resident       Addendum:    Troponin was negative, EKG showed sinus tach at 109. Will restart home atenolol. Stable for discharge. Discussed with Dr. Valeria Ngo.       Glenis Duke MD   General Surgery Resident

## 2020-06-30 NOTE — PROGRESS NOTES
Pt A&O, VSS. Neck incision remains CDI, some swelling to site noted. Open to air. Voice is raspy. Denies difficulty swallowing or breathing. Does feel pressure sensation. IV fluids infusing. Tolerating clear liquids very well. Voiding w/o issues. PRN oxycodone given for breakthrough pain. No other requests at this time, will continue to monitor.

## 2020-06-30 NOTE — CONSULTS
Surgery Daily Progress Note  Gladys Cowan Eusebia  CC:  Graves Disease      Subjective : No acute events overnight. Confirms hunger. Denies any c/o nausea or emesis. Denies any numbness or parathesia. Denies any difficulty swallowing or breating      Objective    Infusions:   lactated ringers 75 mL/hr at 06/30/20 0534        I/O:I/O last 3 completed shifts: In: 1615 [I.V.:1615]  Out: 450 [Urine:400; Blood:50]           Wt Readings from Last 1 Encounters:   06/29/20 170 lb (77.1 kg)                 LABS:    Recent Labs     06/30/20  0506   WBC 8.6   HGB 12.8   HCT 39.3   MCV 87.0           Recent Labs     06/30/20  0506      K 4.7      CO2 27   PHOS 5.0*   BUN 13   CREATININE <0.5*      No results for input(s): AST, ALT, ALB, BILIDIR, BILITOT, ALKPHOS in the last 72 hours. No results for input(s): LIPASE, AMYLASE in the last 72 hours. No results for input(s): PROT, INR, APTT in the last 72 hours. No results for input(s): CKTOTAL, CKMB, CKMBINDEX, TROPONINI in the last 72 hours. Exam:BP (!) 153/72   Pulse 93   Temp 98.4 °F (36.9 °C) (Oral)   Resp 17   Ht 5' 7\" (1.702 m)   Wt 170 lb (77.1 kg)   SpO2 94%   BMI 26.63 kg/m²   General appearance: alert, appears stated age and cooperative  Lungs: clear to auscultation bilaterally  Heart: regular rate and rhythm, S1, S2   Abdomen: soft, appropriately-tender; bowel sounds present  NECK:  Incision c/d/i.  No hematoma, no fullness noted    ASSESSMENT/PLAN: Pt. is a 53 y.o. female s/p total thyroidectomy     - follow up calcium  - will discuss advancing diet  - if tolerates diet, will likely be discharged home today      Elizabeth Hooper 6/30/2020 6:28 AM  [phone]

## 2020-06-30 NOTE — PROGRESS NOTES
Nurse went to discharge Patient around 1400 and Patient notified nurse she was SOB. Nurse took a set of VS and Patient O2 Saturation was 93% on Room air and Patient was Tachycardic with a HR in the 120's. Nurse notified surgical residents and EKG was ordered. Surgical residents came to the bedside to evaluate. Patient was cleared for discharge and given dose of atenolol before leaving.   Electronically signed by Newton Barry RN on 6/30/2020 at 5:40 PM

## 2020-06-30 NOTE — CARE COORDINATION
(payment due at time of pick-up or delivery - cash, check, or card accepted)     Able to afford home medications/ co-pay costs: No    ADLS:  Current PT AM-PAC Score:   /24  Current OT AM-PAC Score:   /24      DISCHARGE Disposition: Home- No Services Needed    LOC at discharge: Not Applicable  TANNER Completed: Not Indicated    Notification completed in HENS/PAS?:  Not Applicable    IMM Completed:   Not Indicated    Transportation:  Transportation PLAN for discharge: family   Mode of Transport: Private Car  Reason for medical transport: Not Applicable  Name of Transport Company: Not Applicable  Time of Transport:     Transport form completed: Not Indicated    Home Care:  [address] ordered at discharge: Not [address]: Not Applicable  Orders faxed: No    Durable Medical Equipment:  DME Provider:   Equipment obtained during hospitalization:     Home Oxygen and Respiratory Equipment:  Oxygen needed at discharge?: Not [address]: Not Applicable  Portable tank available for discharge?: Not Indicated    Dialysis:  Dialysis patient: No    Dialysis Center:  Not Applicable    Hospice Services:  Location: Not Applicable  Agency: Not Applicable    Consents signed: Not Indicated    Referrals made at [facility] for outpatient continued care:  Not Applicable    Additional CM Notes:  Pt home with no needs. The Plan for Transition of Care is related to the following treatment goals of Hyperthyroidism [E05.90]  Graves disease [E05.00]  Graves disease [E05.00]    The Patient and/or patient representative Leann Peña and her family were provided with a choice of provider and agrees with the discharge plan Yes    Freedom of choice list was provided with basic dialogue that supports the patient's individualized plan of care/goals and shares the quality data associated with the providers.  Yes    Care Transitions patient: No    Haydee Bocanegra RN  [facility]  Case Management Department  Ph: [phone] Fax:

## 2020-07-02 NOTE — DISCHARGE SUMMARY
Discharge Summary      Patient:  Nani Asa Date: 6/29/2020  5:36 AM    Discharge Date: 6/30/2020    Admitting Physician: Sheryle Garland, DO     Discharge Physician: same    Admitting Diagnosis:  Hyperthyroidism     Discharge Diagnosis: same     Past Medical History:   Diagnosis Date    Hypertension     Hyperthyroidism         Indication for Admission:   Ms. Garrick Falk is a 53year old female with history of Graves disease who presented to the hospital for surgical management. Hospital Course: The patient was taken to the OR for a total thyroidectomy. She tolerated the procedure well. Post operatively she complained of a sore throat. She denied any difficulty breathing or swallowing. Her post operative PTH dropped initially but then stabilized. POD#1 Pain was well controlled. Her calcium was normal. Her diet was advanced to general and she was able to tolerate it. Prior to leaving the hospital the patient became tachycardic. An EKG was performed which showed sinus tachycardia. Her troponin was negative. She was restarted on her home atenolol. Later in the afternoon the patient improved and was stable for discharge home. Procedures: Total thyroidectomy 6/29/2020    Discharge physical exam:  /72   Pulse 83   Temp 99.3 °F (37.4 °C) (Oral)   Resp 18   Ht 5' 7\" (1.702 m)   Wt 170 lb (77.1 kg)   SpO2 96%   BMI 26.63 kg/m²   General appearance: alert, appears stated age and cooperative  Lungs: clear to auscultation bilaterally  Heart: regular rate and rhythm, S1, S2   Abdomen: soft, appropriately-tender; bowel sounds present  NECK:  Incision c/d/i.  No hematoma, no fullness noted    Disposition:  home    Condition at discharge:  Stable    Discharge Instructions:  See separate form    Patient Instructions:      Medication List      START taking these medications    docusate sodium 100 MG capsule  Commonly known as:  Colace  Take 1 capsule by mouth 2 times daily for 14 days Please take while taking narcotic pain medicine. If you develop loose or watery stools, then stop taking. levothyroxine 50 MCG tablet  Commonly known as:  SYNTHROID  Take 1 tablet by mouth Daily     oxyCODONE 5 MG immediate release tablet  Commonly known as:  ROXICODONE  Take 1 tablet by mouth every 6 hours as needed for Pain for up to 5 days.         CONTINUE taking these medications    atenolol 25 MG tablet  Commonly known as:  TENORMIN  Take 1 tablet by mouth daily           Where to Get Your Medications      You can get these medications from any pharmacy    Bring a paper prescription for each of these medications  · docusate sodium 100 MG capsule  · levothyroxine 50 MCG tablet  · oxyCODONE 5 MG immediate release tablet     Information about where to get these medications is not yet available    Ask your nurse or doctor about these medications  · atenolol 25 MG tablet         Asha Anna DO  07/02/20  3:38 PM  [phone]

## 2020-07-07 ENCOUNTER — OFFICE VISIT (OUTPATIENT)
Dept: ENT CLINIC | Age: 55
End: 2020-07-07

## 2020-07-07 VITALS — TEMPERATURE: 99.1 F | HEIGHT: 67 IN | WEIGHT: 175 LBS | BODY MASS INDEX: 27.47 KG/M2

## 2020-07-07 PROCEDURE — 99024 POSTOP FOLLOW-UP VISIT: CPT | Performed by: OTOLARYNGOLOGY

## 2020-07-07 NOTE — PROGRESS NOTES
[facility]  [address], [address]  P: [phone]  F: [phone]       Patient     Miles Henrandez  1965    ChiefComplaint     Chief Complaint   Patient presents with    Post-Op Check     Patient is here today for her pre -op, she is doing fine but still cannot raise her voice and is having discomfort when she swallows       History of Present Illness     Miles Hernandez is a pleasant 53 y.o. female here for 1 week postop visit for total thyroidectomy for Hashimoto's thyroiditis. She is overall doing well. She is not experiencing any significant pain or swelling of her neck. Her tremors have improved. She denies any numbness or tingling of her mouth or fingers. She did have a murmur discovered during her postoperative stay. She is going to follow-up with her PCP about this. She is currently on 50 mcg of Synthroid. She has not seen her endocrinologist yet. She states she is unable to raise her voice very loudly. She has some mild discomfort upon swallowing.     Past Medical History     Past Medical History:   Diagnosis Date    Hypertension     Hyperthyroidism        Past Surgical History     Past Surgical History:   Procedure Laterality Date    CHOLECYSTECTOMY      HYSTERECTOMY      THYROIDECTOMY Bilateral 6/29/2020    TOTAL THYROIDECTOMY performed by Filiberto Acosta DO at [address]         Family History     Family History   Adopted: Yes       Social History     Social History     Socioeconomic History    Marital status:      Spouse name: Not on file    Number of children: Not on file    Years of education: Not on file    Highest education level: Not on file   Occupational History    Not on file   Social Needs    Financial resource strain: Not on file    Food insecurity     Worry: Not on file     Inability: Not on file    Transportation needs     Medical: Not on file     Non-medical: Not on file   Tobacco Use    Smoking status: Current Every Day Smoker     Packs/day: 0.25     Years: 25.00     Pack years: 6.25     Types: Cigarettes    Smokeless tobacco: Never Used    Tobacco comment: 3 cig a day   Substance and Sexual Activity    Alcohol use: Yes     Comment: socially    Drug use: No    Sexual activity: Yes     Partners: Male   Lifestyle    Physical activity     Days per week: Not on file     Minutes per session: Not on file    Stress: Not on file   Relationships    Social connections     Talks on phone: Not on file     Gets together: Not on file     Attends [employer] service: Not on file     Active member of club or organization: Not on file     Attends meetings of clubs or organizations: Not on file     Relationship status: Not on file    Intimate partner violence     Fear of current or ex partner: Not on file     Emotionally abused: Not on file     Physically abused: Not on file     Forced sexual activity: Not on file   Other Topics Concern    Not on file   Social History Narrative    Not on file       Allergies     No Known Allergies    Medications     Current Outpatient Medications   Medication Sig Dispense Refill    levothyroxine (SYNTHROID) 50 MCG tablet Take 1 tablet by mouth Daily 30 tablet 0    docusate sodium (COLACE) 100 MG capsule Take 1 capsule by mouth 2 times daily for 14 days Please take while taking narcotic pain medicine. If you develop loose or watery stools, then stop taking. 28 capsule 0    atenolol (TENORMIN) 25 MG tablet Take 1 tablet by mouth daily 30 tablet 3     No current facility-administered medications for this visit. Review of Systems     Review of Systems      PhysicalExam     Vitals:    07/07/20 0957   Temp: 99.1 °F (37.3 °C)       Physical Exam  Neck is soft. Incision healing well. No evidence of hematoma or infection. Voice is strong but somewhat gravelly  Sensation intact    Procedure           Assessment and Plan     1.  Graves disease  Patient is one-week status post total thyroidectomy for Hashimoto thyroiditis. Incision healing well. She is currently on 50 mcg of Synthroid a day. I instructed her to follow-up with her endocrinologist Dr. Bradley Vincent to discuss management of her thyroid hormone levels. She is not experiencing any symptoms of hypocalcemia. Postoperative ionized calcium within normal range as well as PTH. Additionally, she should follow-up with her PCP to discuss further requirements for her atenolol and work-up for a possible heart murmur. Follow-up in 1 month. If voice is not improved we will perform laryngoscopy    2. Hyperthyroidism        Return in about 4 weeks (around 8/4/2020). Portions of this note were dictated using Dragon.  There may be linguistic errors secondary to the use of this program.

## 2020-07-08 ENCOUNTER — TELEPHONE (OUTPATIENT)
Dept: ENDOCRINOLOGY | Age: 55
End: 2020-07-08

## 2020-07-08 NOTE — TELEPHONE ENCOUNTER
I spoke with patient. She feels well other than her voice. Recommended to increase levothyroxine dose, but patient prefers to keep the same for now. Will do test in 3 weeks, and then decide on dose. I ordered labs. Please schedule lachelle in about 4 weeks.

## 2020-07-27 RX ORDER — ATENOLOL 25 MG/1
25 TABLET ORAL DAILY
Qty: 90 TABLET | Refills: 0 | OUTPATIENT
Start: 2020-07-27

## 2020-07-28 NOTE — TELEPHONE ENCOUNTER
Patient had thyroid surgery, atenolol is not needed from thyroid point. If she needs it from blood pressure or heart rate standpoint, then she needs to discuss that with PCP. Let me know.

## 2020-07-29 DIAGNOSIS — E05.90 HYPERTHYROIDISM: ICD-10-CM

## 2020-07-29 DIAGNOSIS — E04.1 THYROID NODULE: ICD-10-CM

## 2020-07-29 DIAGNOSIS — E05.00 GRAVES DISEASE: ICD-10-CM

## 2020-07-29 DIAGNOSIS — E83.51 HYPOCALCEMIA: ICD-10-CM

## 2020-07-29 LAB
A/G RATIO: 1.6 (ref 1.1–2.2)
ALBUMIN SERPL-MCNC: 3.8 G/DL (ref 3.4–5)
ALP BLD-CCNC: 190 U/L (ref 40–129)
ALT SERPL-CCNC: 16 U/L (ref 10–40)
ANION GAP SERPL CALCULATED.3IONS-SCNC: 13 MMOL/L (ref 3–16)
AST SERPL-CCNC: 15 U/L (ref 15–37)
BILIRUB SERPL-MCNC: 0.3 MG/DL (ref 0–1)
BUN BLDV-MCNC: 21 MG/DL (ref 7–20)
CALCIUM SERPL-MCNC: 8.8 MG/DL (ref 8.3–10.6)
CHLORIDE BLD-SCNC: 105 MMOL/L (ref 99–110)
CO2: 23 MMOL/L (ref 21–32)
CREAT SERPL-MCNC: 0.7 MG/DL (ref 0.6–1.1)
GFR AFRICAN AMERICAN: >60
GFR NON-AFRICAN AMERICAN: >60
GLOBULIN: 2.4 G/DL
GLUCOSE BLD-MCNC: 143 MG/DL (ref 70–99)
MAGNESIUM: 2 MG/DL (ref 1.8–2.4)
PARATHYROID HORMONE INTACT: 60.9 PG/ML (ref 14–72)
PHOSPHORUS: 3.2 MG/DL (ref 2.5–4.9)
POTASSIUM SERPL-SCNC: 4.1 MMOL/L (ref 3.5–5.1)
SODIUM BLD-SCNC: 141 MMOL/L (ref 136–145)
T3 FREE: 1.4 PG/ML (ref 2.3–4.2)
T3 TOTAL: 0.49 NG/ML (ref 0.8–2)
T4 FREE: 0.5 NG/DL (ref 0.9–1.8)
T4 TOTAL: 3.4 UG/DL (ref 4.5–10.9)
TOTAL PROTEIN: 6.2 G/DL (ref 6.4–8.2)
TSH SERPL DL<=0.05 MIU/L-ACNC: 4.22 UIU/ML (ref 0.27–4.2)
VITAMIN D 25-HYDROXY: 49.8 NG/ML

## 2020-07-31 LAB
CALCIUM IONIZED, CALC AT PH 7.4: 1.24 MMOL/L (ref 1.11–1.3)
CALCIUM IONIZED: 1.2 MMOL/L (ref 1.11–1.3)

## 2020-08-03 NOTE — TELEPHONE ENCOUNTER
PT requests a call back she has ran out of levothyroxine.  She had her thyroid removed 6/29/20 and needs a call back to discuss the medication

## 2020-08-03 NOTE — TELEPHONE ENCOUNTER
Patient needs a new script for her Thyroid med levothyroxine she said that the pharmacy call on Thursday but I do not see any record of that conversation

## 2020-08-04 ENCOUNTER — TELEPHONE (OUTPATIENT)
Dept: ENT CLINIC | Age: 55
End: 2020-08-04

## 2020-08-04 RX ORDER — LEVOTHYROXINE SODIUM 0.05 MG/1
50 TABLET ORAL DAILY
Qty: 30 TABLET | Refills: 0 | Status: SHIPPED | OUTPATIENT
Start: 2020-08-04 | End: 2020-08-05

## 2020-08-04 NOTE — TELEPHONE ENCOUNTER
Patient has been notified that her script has been sent to her pharmacy. In the future, as I explained in my previous decline of the medication, Dr. Dario Rahman should be providing refills as she will be managing the TSH/medication level. Patient needs a new script for her Thyroid med levothyroxine she said that the pharmacy call on Thursday but I do not see any record of that conversation, she told that her doctor would not refill her script because she was still under Dr. Marysol Paris care.

## 2020-08-04 NOTE — TELEPHONE ENCOUNTER
In the future, as I explained in my previous decline of the medication, Dr. Mirna Peters should be providing refills as she will be managing the TSH/medication level.

## 2020-08-05 ENCOUNTER — OFFICE VISIT (OUTPATIENT)
Dept: ENDOCRINOLOGY | Age: 55
End: 2020-08-05
Payer: COMMERCIAL

## 2020-08-05 VITALS
BODY MASS INDEX: 29.41 KG/M2 | HEIGHT: 67 IN | DIASTOLIC BLOOD PRESSURE: 86 MMHG | HEART RATE: 88 BPM | OXYGEN SATURATION: 98 % | SYSTOLIC BLOOD PRESSURE: 136 MMHG | WEIGHT: 187.4 LBS | TEMPERATURE: 97.7 F

## 2020-08-05 PROBLEM — E66.3 OVERWEIGHT (BMI 25.0-29.9): Status: ACTIVE | Noted: 2020-08-05

## 2020-08-05 PROBLEM — E89.0 POSTOPERATIVE HYPOTHYROIDISM: Status: ACTIVE | Noted: 2020-08-05

## 2020-08-05 PROBLEM — Z86.39 HISTORY OF HYPERTHYROIDISM: Status: ACTIVE | Noted: 2020-08-05

## 2020-08-05 PROCEDURE — 3017F COLORECTAL CA SCREEN DOC REV: CPT | Performed by: INTERNAL MEDICINE

## 2020-08-05 PROCEDURE — 99214 OFFICE O/P EST MOD 30 MIN: CPT | Performed by: INTERNAL MEDICINE

## 2020-08-05 PROCEDURE — 4004F PT TOBACCO SCREEN RCVD TLK: CPT | Performed by: INTERNAL MEDICINE

## 2020-08-05 PROCEDURE — G8417 CALC BMI ABV UP PARAM F/U: HCPCS | Performed by: INTERNAL MEDICINE

## 2020-08-05 PROCEDURE — G8427 DOCREV CUR MEDS BY ELIG CLIN: HCPCS | Performed by: INTERNAL MEDICINE

## 2020-08-05 RX ORDER — LEVOTHYROXINE SODIUM 0.07 MG/1
75 TABLET ORAL DAILY
Qty: 30 TABLET | Refills: 0 | Status: SHIPPED | OUTPATIENT
Start: 2020-08-05 | End: 2020-08-31 | Stop reason: SDUPTHER

## 2020-08-05 NOTE — PROGRESS NOTES
SUBJECTIVE:  Micah Leo is a 53 y.o. female who is here for hypothyroidism. 1. Hypothyroidism    This started in 2020. Patient was diagnosed with postsurgical Hypothyroidism. The problem has been uncontrolled. Patient started medication in 2020. Currently patient is on: Levothyroxine. Misses 0 doses a month. Current complaints:  heat intolerance, hair thinning, sweating, puffiness around eyes. Improved. 2.  History of hyperthyroidism    This started in 2019. Patient was diagnosed with hyperthyroidism. The problem had been gradually worsening. Previous thyroid studies include: TSH and free thyroxine. Patient started methimazole in 7/2019. Patient had severe nausea on methimazole. Stopped after surgery. History of obstructive symptoms: difficulty swallowing Yes, changes in voice/hoarseness Yes. History of radiation to patient's neck: No  Resent iodine exposure: No  Family history includes unknown  Family history of thyroid cancer: unknown    Date: 6/29/2020TOTAL THYROIDECTOMY   Surgeon  Role    Srikanth Zepeda DO       FINAL DIAGNOSIS:     Total thyroid:   -   Diffuse hyperplasia (Grave's disease). -   Attached small lymph node is noted. -   No malignant tumor is identified    3. Overweight  Lost 30 lbs over 5 months on diet, exercise. 4. Graves' disease  Positive antibodies        Thyroid uptake and scan.       HISTORY: Hyperthyroidism       COMPARISON: Thyroid ultrasound April 26, 2019.       228.4 uCi of I-123 was administered by mouth followed by thyroid uptake and scan       Homogeneous activity within the right and left lobes of the thyroid without hot or cold nodule. The right lobe is larger than the left incidental.       The 6 mm nodule noted inferior pole right lobe on previous ultrasound examination is beyond the spatial resolution of the thyroid scan.       Uptake at 24 hours is 73% which is markedly elevated with normal between 10 and 35%.            Impression     Markedly increased uptake at 73.6% indeterminate though likely relates to diffuse toxic goiter/Graves' disease         THYROID ULTRASOUND       CLINICAL HISTORY: Goiter. Thyromegaly.        FINDINGS:  Ultrasound imaging of the thyroid gland was performed. The right left lobes of the thyroid gland are diffusely enlarged. The right lobe of the thyroid gland measures 6.1 x 2.8 x 2.4 cm. Left lobe of the thyroid gland measures 5.4 x 2.2 x 2.1    cm in size. Echotexture of the gland is mildly heterogeneous with some areas of lobulation. There is a small mixed echogenic nodule identified medially within the inferior pole of the right lobe of the thyroid measuring 0.6 x 0.5 x 0.4 cm.       There is increased Doppler flow identified within the thyroid gland.           Impression           1. Diffusely enlarged thyroid gland with hyperemia on Doppler flow. Thyromegaly is nonspecific but can be seen with Graves' disease, or Hashimoto's thyroiditis. Correlate with thyroid function tests. 2. Small thyroid nodule is identified at the inferior pole the right lobe of the thyroid gland.  Recommend sonographic follow-up in one year to confirm stability.       Order History         Past Medical History:   Diagnosis Date    Hypertension     Hyperthyroidism      Patient Active Problem List    Diagnosis Date Noted    Postoperative hypothyroidism 08/05/2020    History of hyperthyroidism 08/05/2020    Overweight (BMI 25.0-29.9) 08/05/2020    Graves' disease 06/29/2020    Smoking 10/30/2019    Class 1 obesity with body mass index (BMI) of 31.0 to 31.9 in adult 05/21/2019    Acute back pain 01/30/2018     Past Surgical History:   Procedure Laterality Date    CHOLECYSTECTOMY      HYSTERECTOMY      THYROIDECTOMY Bilateral 6/29/2020    TOTAL THYROIDECTOMY performed by Amita Lincoln DO at [address] [facility]       Family History   Adopted: Yes     Social History     Socioeconomic History    Marital status:  mouth, no sinus problems, has difficulty swallowing, no neck lumps, no dental problems, no mouth sores, no ringing in ears  Pulmonary: has shortness of breath, no wheezing, no dyspnea on exertion, has cough  Cardiovascular: has chest pain, has lower extremity edema, no orthopnea, no intermittent leg claudication, has palpitations  Gastrointestinal: no abdominal pain, no nausea, no vomiting, has diarrhea, no constipation, no dysphagia, no heartburn, no bloating  Genitourinary: no dysuria, no urinary incontinence, no urinary hesitancy, no urinary frequency, no feelings of urinary urgency, has nocturia  Musculoskeletal: no joint swelling, no joint stiffness, no joint pain, no muscle cramps, has muscle pain, no bone pain  Integument/Breast: no hair loss, no skin rashes, no skin lesions, no itching, no dry skin  Neurological: no numbness, no tingling, no weakness, no confusion, has headaches, has dizziness, no fainting, no tremors, no decrease in memory, no balance problems  Psychiatric: has anxiety, no depression, no insomnia  Hematologic/Lymphatic: no tendency for easy bleeding, no swollen lymph nodes, no tendency for easy bruising  Immunology: no seasonal allergies, no frequent infections, no frequent illnesses  Endocrine: no temperature intolerance, has hand tremor    /86 (Site: Left Upper Arm, Position: Sitting, Cuff Size: Medium Adult)   Pulse 88   Temp 97.7 °F (36.5 °C) (Infrared)   Ht 5' 7\" (1.702 m)   Wt 187 lb 6.4 oz (85 kg)   SpO2 98%   BMI 29.35 kg/m²    Wt Readings from Last 3 Encounters:   08/05/20 187 lb 6.4 oz (85 kg)   07/07/20 175 lb (79.4 kg)   06/29/20 170 lb (77.1 kg)     Body mass index is 29.35 kg/m².     OBJECTIVE:  Constitutional: no acute distress, well appearing and well nourished  Psychiatric: oriented to person, place and time, judgement and insight and normal, recent and remote memory and intact and mood and affect are normal  Skin: skin and subcutaneous tissue is normal without Component Value Date    HDL 46 01/13/2017     Lab Results   Component Value Date    LDLCALC 134 01/13/2017     Lab Results   Component Value Date    LABVLDL 33 01/13/2017     No results found for: [facility]  Lab Results   Component Value Date    LABMICR Yes 02/21/2018     Lab Results   Component Value Date    VITD25 49.8 07/29/2020        ASSESSMENT/PLAN:  1. Hypothyroidism  Uncontrolled  Increase levothyroxine to 0.075 mg qd  TSH 4.22  - T3; Future  - T3, Free; Future  - T4; Future  - T4, Free; Future  - CBC Auto Differential; Future  - Comprehensive Metabolic Panel; Future  - TSH without Reflex; Future    2. History of hyperthyroidism  PTH 60.9  Calcium 8.8  Albumin 3.8  Post surgery  FINAL DIAGNOSIS:   Total thyroid:   -   Diffuse hyperplasia (Grave's disease). -   Attached small lymph node is noted. -   No malignant tumor is     3. Overweight  Diet, exercise. 4.  Graves' disease  No double vision, blurry vision      Reviewed and/or ordered clinical lab results Yes  Reviewed and/or ordered radiology tests Yes   Reviewed and/or ordered other diagnostic tests No  Discussed test results with performing physician No  Independently reviewed image, tracing, or specimen No  Made a decision to obtain old records No  Reviewed and summarized old records Yes  TSH less than 0.01  FT4 5.7  TSH 0.63 in 2017  Obtained history from other than patient No    Miles Hernandez was counseled regarding symptoms of thyroid diagnosis, course and complications of disease if inadequately treated, side effects of medications, diagnosis, treatment options, and prognosis, risks, benefits, complications, and alternatives of treatment, labs, imaging and other studies and treatment targets and goals, I-131 Tx, methimazole, side effects, remission, permanent hypothyroidism, Graves' eye disease, smoking cessations, risk of worsening of eye disease with C-104, surgery, complications.     She understands instructions and counseling. Return in about 4 weeks (around 9/2/2020) for thyroid problems.

## 2020-08-07 ENCOUNTER — OFFICE VISIT (OUTPATIENT)
Dept: ENT CLINIC | Age: 55
End: 2020-08-07
Payer: COMMERCIAL

## 2020-08-07 VITALS — HEIGHT: 67 IN | BODY MASS INDEX: 29.03 KG/M2 | WEIGHT: 185 LBS | TEMPERATURE: 97.9 F

## 2020-08-07 PROCEDURE — 99213 OFFICE O/P EST LOW 20 MIN: CPT | Performed by: OTOLARYNGOLOGY

## 2020-08-07 PROCEDURE — G8427 DOCREV CUR MEDS BY ELIG CLIN: HCPCS | Performed by: OTOLARYNGOLOGY

## 2020-08-07 PROCEDURE — 4004F PT TOBACCO SCREEN RCVD TLK: CPT | Performed by: OTOLARYNGOLOGY

## 2020-08-07 PROCEDURE — 31575 DIAGNOSTIC LARYNGOSCOPY: CPT | Performed by: OTOLARYNGOLOGY

## 2020-08-07 PROCEDURE — G8417 CALC BMI ABV UP PARAM F/U: HCPCS | Performed by: OTOLARYNGOLOGY

## 2020-08-07 PROCEDURE — 3017F COLORECTAL CA SCREEN DOC REV: CPT | Performed by: OTOLARYNGOLOGY

## 2020-08-07 RX ORDER — OMEPRAZOLE 40 MG/1
40 CAPSULE, DELAYED RELEASE ORAL DAILY
Qty: 30 CAPSULE | Refills: 2 | Status: SHIPPED | OUTPATIENT
Start: 2020-08-07 | End: 2021-06-16

## 2020-08-07 ASSESSMENT — ENCOUNTER SYMPTOMS
TROUBLE SWALLOWING: 0
VOICE CHANGE: 1
NAUSEA: 0
CHOKING: 0
COLOR CHANGE: 0
EYE REDNESS: 0
SINUS PAIN: 0
COUGH: 1
EYE PAIN: 0
RHINORRHEA: 0
SHORTNESS OF BREATH: 0
PHOTOPHOBIA: 0
STRIDOR: 0
SORE THROAT: 0
EYE ITCHING: 0
DIARRHEA: 0
SINUS PRESSURE: 0
FACIAL SWELLING: 0

## 2020-08-07 NOTE — PROGRESS NOTES
[facility]  [address] [address] [facility], [address] [place] [address] [place]  P: [phone]  F: [phone]       Patient     Becca Bray  1965    ChiefComplaint     Chief Complaint   Patient presents with    Post-Op Check     Patient is here today for her post op for her thyroid, everything is going well except for her voice she cannot raise her voice and by the end of the day she is tired of talking       History of Present Illness     Becca Bray is a pleasant 53 y.o. female here for a 1 month postop visit for total thyroidectomy for hyperthyroidism. She has seen her endocrinologist and her Synthroid is now being managed. She denies any neck pain. She is experiencing some dysphasia and aspiration symptoms with liquid. Drinking water causes her to cough. Her voice is still hoarse and gets worse as the day goes on. She is unable to raise her voice. She has been experiencing increasing reflux symptoms lately. She is never had this in the past.  She is clearing her throat constantly and coughing waking up in the middle the night.     Past Medical History     Past Medical History:   Diagnosis Date    Hypertension     Hyperthyroidism        Past Surgical History     Past Surgical History:   Procedure Laterality Date    CHOLECYSTECTOMY      HYSTERECTOMY      THYROIDECTOMY Bilateral 6/29/2020    TOTAL THYROIDECTOMY performed by Aysha Covarrubias DO at [address]         Family History     Family History   Adopted: Yes       Social History     Social History     Socioeconomic History    Marital status:      Spouse name: Not on file    Number of children: Not on file    Years of education: Not on file    Highest education level: Not on file   Occupational History    Not on file   Social Needs    Financial resource strain: Not on file    Food insecurity     Worry: Not on file     Inability: Not on file    Transportation needs     Medical: Not on file Respiratory: Positive for cough. Negative for choking, shortness of breath and stridor. Gastrointestinal: Negative for diarrhea and nausea. Musculoskeletal: Negative for neck pain and neck stiffness. Skin: Negative for color change and rash. Neurological: Negative for dizziness, facial asymmetry and light-headedness. Hematological: Negative for adenopathy. Psychiatric/Behavioral: Negative for agitation and confusion. PhysicalExam     Vitals:    08/07/20 0901   Temp: 97.9 °F (36.6 °C)       Physical Exam  Constitutional:       Appearance: She is well-developed. HENT:      Head: Normocephalic and atraumatic. Jaw: No trismus. Right Ear: Tympanic membrane, ear canal and external ear normal. No drainage. No middle ear effusion. Tympanic membrane is not perforated. Left Ear: Tympanic membrane, ear canal and external ear normal. No drainage. No middle ear effusion. Tympanic membrane is not perforated. Nose: No septal deviation, mucosal edema or rhinorrhea. Mouth/Throat:      Dentition: Normal dentition. Pharynx: Uvula midline. No oropharyngeal exudate. Eyes:      General: No scleral icterus. Right eye: No discharge. Left eye: No discharge. Pupils: Pupils are equal, round, and reactive to light. Neck:      Musculoskeletal: Neck supple. Thyroid: No thyromegaly. Trachea: Phonation normal. No tracheal deviation. Pulmonary:      Effort: Pulmonary effort is normal. No respiratory distress. Breath sounds: No stridor. Lymphadenopathy:      Cervical: No cervical adenopathy. Skin:     General: Skin is warm and dry. Neurological:      Mental Status: She is alert and oriented to person, place, and time. Cranial Nerves: No cranial nerve deficit. Psychiatric:         Behavior: Behavior normal.       Neck incision is well-healed. No erythema, swelling or drainage.     Voice is low and gravelly    Procedure     Flexible Laryngoscopy    Pre op: Dysphonia. Procedure : Flexible Laryngoscopy  Surgeon: Елена Samuel DO  Anesthesia: Afrin with 4% lidocaine  Indication: Laryngeal mirror examination was not tolerated due to gag reflex  Description:  The scope was passed along the floor of the left naris to the level of the larynx. There was no evidence of concerning masses or lesions of the base of tongue, vallecula, epiglottis, aryepiglottic folds, arytenoids, false vocal folds, true vocal folds, or pyriform sinuses. True vocal folds exhibited symmetric motion bilaterally without evidence of paralysis or paresis. The scope was removed. The patient tolerated the procedure without difficulty. There were no complications. Pertinent findings: Normal symmetric true vocal fold motion bilaterally. Moderate to severe postcricoid edema with interarytenoid granulation tissue. Assessment and Plan     1. Post-operative hypothyroidism  Patient is doing well status post total thyroidectomy for hyperthyroidism. Her thyroid hormone levels are being managed by her endocrinologist.  Incision healing well. No neck swelling. 2. Dysphonia  Patient's dysphonia is likely secondary to moderate to severe laryngopharyngeal reflux. There may also be a temporary stenting of 1 of the laryngeal nerves. However on laryngoscopy today vocal cords exhibit symmetric motion bilaterally. She does have some moderate to severe postcricoid edema with interarytenoid granulation tissue. I am to place her on Prilosec 40 mg before dinner. I will see her in 2 months to assess improvement. - omeprazole (PRILOSEC) 40 MG delayed release capsule; Take 1 capsule by mouth daily  Dispense: 30 capsule; Refill: 2    3. Laryngopharyngeal reflux (LPR)    - omeprazole (PRILOSEC) 40 MG delayed release capsule; Take 1 capsule by mouth daily  Dispense: 30 capsule; Refill: 2      Return in about 2 months (around 10/7/2020).       Portions of this note were dictated using Dragon.  There may be linguistic errors secondary to the use of this program.

## 2020-08-28 DIAGNOSIS — E89.0 POSTOPERATIVE HYPOTHYROIDISM: ICD-10-CM

## 2020-08-28 LAB
T3 FREE: 1.9 PG/ML (ref 2.3–4.2)
T3 TOTAL: 0.74 NG/ML (ref 0.8–2)
T4 FREE: 0.9 NG/DL (ref 0.9–1.8)
T4 TOTAL: 6.1 UG/DL (ref 4.5–10.9)
TSH SERPL DL<=0.05 MIU/L-ACNC: 7.04 UIU/ML (ref 0.27–4.2)

## 2020-08-31 RX ORDER — LEVOTHYROXINE SODIUM 0.07 MG/1
75 TABLET ORAL DAILY
Qty: 30 TABLET | Refills: 2 | Status: SHIPPED | OUTPATIENT
Start: 2020-08-31 | End: 2020-09-02 | Stop reason: SDUPTHER

## 2020-09-02 ENCOUNTER — OFFICE VISIT (OUTPATIENT)
Dept: ENDOCRINOLOGY | Age: 55
End: 2020-09-02
Payer: COMMERCIAL

## 2020-09-02 VITALS
OXYGEN SATURATION: 97 % | DIASTOLIC BLOOD PRESSURE: 73 MMHG | TEMPERATURE: 98.6 F | RESPIRATION RATE: 14 BRPM | BODY MASS INDEX: 29.66 KG/M2 | SYSTOLIC BLOOD PRESSURE: 129 MMHG | HEART RATE: 72 BPM | WEIGHT: 189 LBS | HEIGHT: 67 IN

## 2020-09-02 PROCEDURE — 99214 OFFICE O/P EST MOD 30 MIN: CPT | Performed by: INTERNAL MEDICINE

## 2020-09-02 RX ORDER — LEVOTHYROXINE SODIUM 88 UG/1
88 TABLET ORAL DAILY
Qty: 30 TABLET | Refills: 2 | Status: SHIPPED | OUTPATIENT
Start: 2020-09-02 | End: 2020-10-21 | Stop reason: SDUPTHER

## 2020-09-02 NOTE — PROGRESS NOTES
SUBJECTIVE:  Moses Crawford is a 53 y.o. female who is here for hypothyroidism. 1. Hypothyroidism    This started in 2020. Patient was diagnosed with postsurgical Hypothyroidism. The problem has been uncontrolled. Patient started medication in 2020. Currently patient is on: Levothyroxine. Misses 0 doses a month. Current complaints:  Fatigue, weight gain, heat intolerance, hair thinning, sweating, puffiness around eyes. Improved. 2.  History of hyperthyroidism    This started in 2019. Patient was diagnosed with hyperthyroidism. The problem had been gradually worsening. Previous thyroid studies include: TSH and free thyroxine. Patient started methimazole in 7/2019. Patient had severe nausea on methimazole. Stopped after surgery. History of obstructive symptoms: difficulty swallowing Yes, changes in voice/hoarseness Yes. History of radiation to patient's neck: No  Resent iodine exposure: No  Family history includes unknown  Family history of thyroid cancer: unknown    Date: 6/29/2020TOTAL THYROIDECTOMY   Surgeon  Role    Yomi Hahn DO       FINAL DIAGNOSIS:     Total thyroid:   -   Diffuse hyperplasia (Grave's disease). -   Attached small lymph node is noted. -   No malignant tumor is identified    3. Overweight  Lost 30 lbs over 5 months on diet, exercise. 4. Graves' disease  Positive antibodies        Thyroid uptake and scan.       HISTORY: Hyperthyroidism       COMPARISON: Thyroid ultrasound April 26, 2019.       228.4 uCi of I-123 was administered by mouth followed by thyroid uptake and scan       Homogeneous activity within the right and left lobes of the thyroid without hot or cold nodule. The right lobe is larger than the left incidental.       The 6 mm nodule noted inferior pole right lobe on previous ultrasound examination is beyond the spatial resolution of the thyroid scan.       Uptake at 24 hours is 73% which is markedly elevated with normal between 10 and 35%.          Impression       Markedly increased uptake at 73.6% indeterminate though likely relates to diffuse toxic goiter/Graves' disease         THYROID ULTRASOUND       CLINICAL HISTORY: Goiter. Thyromegaly.        FINDINGS:  Ultrasound imaging of the thyroid gland was performed. The right left lobes of the thyroid gland are diffusely enlarged. The right lobe of the thyroid gland measures 6.1 x 2.8 x 2.4 cm. Left lobe of the thyroid gland measures 5.4 x 2.2 x 2.1    cm in size. Echotexture of the gland is mildly heterogeneous with some areas of lobulation. There is a small mixed echogenic nodule identified medially within the inferior pole of the right lobe of the thyroid measuring 0.6 x 0.5 x 0.4 cm.       There is increased Doppler flow identified within the thyroid gland.           Impression           1. Diffusely enlarged thyroid gland with hyperemia on Doppler flow. Thyromegaly is nonspecific but can be seen with Graves' disease, or Hashimoto's thyroiditis. Correlate with thyroid function tests. 2. Small thyroid nodule is identified at the inferior pole the right lobe of the thyroid gland.  Recommend sonographic follow-up in one year to confirm stability.       Order History         Past Medical History:   Diagnosis Date    Hypertension     Hyperthyroidism      Patient Active Problem List    Diagnosis Date Noted    Postoperative hypothyroidism 08/05/2020    History of hyperthyroidism 08/05/2020    Overweight (BMI 25.0-29.9) 08/05/2020    Graves' disease 06/29/2020    Smoking 10/30/2019    Class 1 obesity with body mass index (BMI) of 31.0 to 31.9 in adult 05/21/2019    Acute back pain 01/30/2018     Past Surgical History:   Procedure Laterality Date    CHOLECYSTECTOMY      HYSTERECTOMY      THYROIDECTOMY Bilateral 6/29/2020    TOTAL THYROIDECTOMY performed by Sherwin Isaac DO at [id number] [facility]       Family History   Adopted: Yes     Social History     Socioeconomic History    Marital status:      Spouse name: None    Number of children: None    Years of education: None    Highest education level: None   Occupational History    None   Social Needs    Financial resource strain: None    Food insecurity     Worry: None     Inability: None    Transportation needs     Medical: None     Non-medical: None   Tobacco Use    Smoking status: Current Every Day Smoker     Packs/day: 0.25     Years: 25.00     Pack years: 6.25     Types: Cigarettes    Smokeless tobacco: Never Used    Tobacco comment: 3 cig a day   Substance and Sexual Activity    Alcohol use: Yes     Comment: socially    Drug use: No    Sexual activity: Yes     Partners: Male   Lifestyle    Physical activity     Days per week: None     Minutes per session: None    Stress: None   Relationships    Social connections     Talks on phone: None     Gets together: None     Attends Taoist service: None     Active member of club or organization: None     Attends meetings of clubs or organizations: None     Relationship status: None    Intimate partner violence     Fear of current or ex partner: None     Emotionally abused: None     Physically abused: None     Forced sexual activity: None   Other Topics Concern    None   Social History Narrative    None     Current Outpatient Medications   Medication Sig Dispense Refill    levothyroxine (SYNTHROID) 88 MCG tablet Take 1 tablet by mouth Daily 30 tablet 2    atenolol (TENORMIN) 25 MG tablet Take 1 tablet by mouth daily 30 tablet 3    omeprazole (PRILOSEC) 40 MG delayed release capsule Take 1 capsule by mouth daily (Patient not taking: Reported on 9/2/2020) 30 capsule 2     No current facility-administered medications for this visit. No Known Allergies  No family status information on file.        Review of Systems:  Constitutional: has fatigue, no fever, no recent weight gain, has recent weight loss, no changes in appetite  Eyes: no eye pain, no change in vision, no eye and mood and affect are normal  Skin: skin and subcutaneous tissue is normal without mass, normal turgor  Head and Face: examination of head and face revealed no abnormalities  Eyes: no lid or conjunctival swelling, erythema or discharge, pupils are normal, equal, round, reactive to light  Ears/Nose: external inspection of ears and nose revealed no abnormalities, hearing is grossly normal  Oropharynx/Mouth/Face: lips, tongue and gums are normal with no lesions, the voice quality was normal  Neck: neck is supple and symmetric, with midline trachea and no masses, thyroid is absent  Lymphatics: normal cervical lymph nodes, normal supraclavicular nodes  Pulmonary: no increased work of breathing or signs of respiratory distress, lungs are clear to auscultation  Cardiovascular: normal heart rate and rhythm, normal S1 and S2, no murmurs and pedal pulses and 2+ bilaterally, No edema  Abdomen: abdomen is soft, non-tender with no masses  Musculoskeletal: normal gait and station and exam of the digits and nails are normal  Neurological: normal coordination and normal general cortical function, no hand tremor      Lab Review:    Lab Results   Component Value Date    WBC 8.6 06/30/2020    HGB 12.8 06/30/2020    HCT 39.3 06/30/2020    MCV 87.0 06/30/2020     06/30/2020     Lab Results   Component Value Date     07/29/2020    K 4.1 07/29/2020     07/29/2020    CO2 23 07/29/2020    BUN 21 07/29/2020    CREATININE 0.7 07/29/2020    GLUCOSE 143 07/29/2020    CALCIUM 8.8 07/29/2020    PROT 6.2 07/29/2020    LABALBU 3.8 07/29/2020    BILITOT 0.3 07/29/2020    ALKPHOS 190 07/29/2020    AST 15 07/29/2020    ALT 16 07/29/2020    LABGLOM >60 07/29/2020    GFRAA >60 07/29/2020    AGRATIO 1.6 07/29/2020    GLOB 2.4 07/29/2020     Lab Results   Component Value Date    TSH 7.04 08/28/2020    FT3 1.9 08/28/2020     No results found for: LABA1C  No results found for: EAG  Lab Results   Component Value Date    CHOL 213 01/13/2017     Lab Results   Component Value Date    TRIG 166 01/13/2017     Lab Results   Component Value Date    HDL 46 01/13/2017     Lab Results   Component Value Date    LDLCALC 134 01/13/2017     Lab Results   Component Value Date    LABVLDL 33 01/13/2017     No results found for: [facility]  Lab Results   Component Value Date    LABMICR Yes 02/21/2018     Lab Results   Component Value Date    VITD25 49.8 07/29/2020        ASSESSMENT/PLAN:  1. Hypothyroidism  TSH 4.22-7.04  Uncontrolled  Increase levothyroxine to 0.088 mg qd  - T3; Future  - T3, Free; Future  - T4; Future  - T4, Free; Future  - CBC Auto Differential; Future  - Comprehensive Metabolic Panel; Future  - TSH without Reflex; Future    2. History of hyperthyroidism  PTH 60.9  Calcium 8.8  Albumin 3.8  Post surgery  FINAL DIAGNOSIS:   Total thyroid:   -   Diffuse hyperplasia (Grave's disease). -   Attached small lymph node is noted. -   No malignant tumor     3. Overweight  Diet, exercise.     4.  Graves' disease  No double vision, blurry vision      Reviewed and/or ordered clinical lab results Yes  Reviewed and/or ordered radiology tests Yes   Reviewed and/or ordered other diagnostic tests No  Discussed test results with performing physician No  Independently reviewed image, tracing, or specimen No  Made a decision to obtain old records No  Reviewed and summarized old records Yes  TSH less than 0.01  FT4 5.7  TSH 0.63 in 2017  Obtained history from other than patient No    Becca Bray was counseled regarding symptoms of thyroid diagnosis, course and complications of disease if inadequately treated, side effects of medications, diagnosis, treatment options, and prognosis, risks, benefits, complications, and alternatives of treatment, labs, imaging and other studies and treatment targets and goals, I-131 Tx, methimazole, side effects, remission, permanent hypothyroidism, Graves' eye disease, smoking cessations, risk of worsening of eye disease with P-278, surgery, complications. She understands instructions and counseling. Return in about 7 weeks (around 10/21/2020) for thyroid problems.

## 2020-10-19 DIAGNOSIS — E89.0 POSTOPERATIVE HYPOTHYROIDISM: ICD-10-CM

## 2020-10-19 LAB
A/G RATIO: 1.7 (ref 1.1–2.2)
ALBUMIN SERPL-MCNC: 4 G/DL (ref 3.4–5)
ALP BLD-CCNC: 166 U/L (ref 40–129)
ALT SERPL-CCNC: 14 U/L (ref 10–40)
ANION GAP SERPL CALCULATED.3IONS-SCNC: 10 MMOL/L (ref 3–16)
AST SERPL-CCNC: 17 U/L (ref 15–37)
BILIRUB SERPL-MCNC: <0.2 MG/DL (ref 0–1)
BUN BLDV-MCNC: 19 MG/DL (ref 7–20)
CALCIUM SERPL-MCNC: 8.8 MG/DL (ref 8.3–10.6)
CHLORIDE BLD-SCNC: 104 MMOL/L (ref 99–110)
CO2: 26 MMOL/L (ref 21–32)
CREAT SERPL-MCNC: 0.8 MG/DL (ref 0.6–1.1)
GFR AFRICAN AMERICAN: >60
GFR NON-AFRICAN AMERICAN: >60
GLOBULIN: 2.4 G/DL
GLUCOSE BLD-MCNC: 84 MG/DL (ref 70–99)
POTASSIUM SERPL-SCNC: 4.1 MMOL/L (ref 3.5–5.1)
SODIUM BLD-SCNC: 140 MMOL/L (ref 136–145)
TOTAL PROTEIN: 6.4 G/DL (ref 6.4–8.2)
TSH SERPL DL<=0.05 MIU/L-ACNC: 16.85 UIU/ML (ref 0.27–4.2)

## 2020-10-21 ENCOUNTER — OFFICE VISIT (OUTPATIENT)
Dept: ENDOCRINOLOGY | Age: 55
End: 2020-10-21
Payer: COMMERCIAL

## 2020-10-21 VITALS
WEIGHT: 197.6 LBS | BODY MASS INDEX: 31.01 KG/M2 | HEART RATE: 91 BPM | RESPIRATION RATE: 14 BRPM | DIASTOLIC BLOOD PRESSURE: 66 MMHG | HEIGHT: 67 IN | SYSTOLIC BLOOD PRESSURE: 109 MMHG | TEMPERATURE: 97.9 F | OXYGEN SATURATION: 96 %

## 2020-10-21 PROCEDURE — 99214 OFFICE O/P EST MOD 30 MIN: CPT | Performed by: INTERNAL MEDICINE

## 2020-10-21 RX ORDER — LEVOTHYROXINE SODIUM 112 UG/1
112 TABLET ORAL DAILY
Qty: 30 TABLET | Refills: 2 | Status: SHIPPED | OUTPATIENT
Start: 2020-10-21 | End: 2021-01-28 | Stop reason: SDUPTHER

## 2020-10-21 NOTE — PROGRESS NOTES
SUBJECTIVE:  Jose Lagos is a 63 y.o. female who is here for hypothyroidism. 1. Hypothyroidism    This started in 2020. Patient was diagnosed with postsurgical Hypothyroidism. The problem has been uncontrolled. Patient started medication in 2020. Currently patient is on: Levothyroxine. Misses 0 doses a month. Current complaints:  Fatigue, weight gain, heat intolerance, hair thinning, sweating, puffiness around eyes. Improved. 2.  History of hyperthyroidism    This started in 2019. Patient was diagnosed with hyperthyroidism. The problem had been gradually worsening. Previous thyroid studies include: TSH and free thyroxine. Patient started methimazole in 7/2019. Patient had severe nausea on methimazole. Stopped after surgery. History of obstructive symptoms: difficulty swallowing NO, changes in voice/hoarseness Yes. History of radiation to patient's neck: No  Resent iodine exposure: No  Family history includes unknown  Family history of thyroid cancer: unknown    Date: 6/29/2020TOTAL THYROIDECTOMY   Surgeon  Role    Liz Awad DO       FINAL DIAGNOSIS:     Total thyroid:   -   Diffuse hyperplasia (Grave's disease). -   Attached small lymph node is noted. -   No malignant tumor is identified    3. Obesity  Lost 30 lbs over 5 months on diet, exercise. Now gained, thyroid is uncontrolled    4. Graves' disease  Positive antibodies        Thyroid uptake and scan.       HISTORY: Hyperthyroidism       COMPARISON: Thyroid ultrasound April 26, 2019.       228.4 uCi of I-123 was administered by mouth followed by thyroid uptake and scan       Homogeneous activity within the right and left lobes of the thyroid without hot or cold nodule.  The right lobe is larger than the left incidental.       The 6 mm nodule noted inferior pole right lobe on previous ultrasound examination is beyond the spatial resolution of the thyroid scan.       Uptake at 24 hours is 73% which is markedly elevated with normal between 10 and 35%.         Impression       Markedly increased uptake at 73.6% indeterminate though likely relates to diffuse toxic goiter/Graves' disease         THYROID ULTRASOUND       CLINICAL HISTORY: Goiter. Thyromegaly.        FINDINGS:  Ultrasound imaging of the thyroid gland was performed. The right left lobes of the thyroid gland are diffusely enlarged. The right lobe of the thyroid gland measures 6.1 x 2.8 x 2.4 cm. Left lobe of the thyroid gland measures 5.4 x 2.2 x 2.1    cm in size. Echotexture of the gland is mildly heterogeneous with some areas of lobulation. There is a small mixed echogenic nodule identified medially within the inferior pole of the right lobe of the thyroid measuring 0.6 x 0.5 x 0.4 cm.       There is increased Doppler flow identified within the thyroid gland.           Impression           1. Diffusely enlarged thyroid gland with hyperemia on Doppler flow. Thyromegaly is nonspecific but can be seen with Graves' disease, or Hashimoto's thyroiditis. Correlate with thyroid function tests. 2. Small thyroid nodule is identified at the inferior pole the right lobe of the thyroid gland.  Recommend sonographic follow-up in one year to confirm stability.       Order History         Past Medical History:   Diagnosis Date    Hypertension     Hyperthyroidism      Patient Active Problem List    Diagnosis Date Noted    Postoperative hypothyroidism 08/05/2020    History of hyperthyroidism 08/05/2020    Overweight (BMI 25.0-29.9) 08/05/2020    Graves' disease 06/29/2020    Smoking 10/30/2019    Class 1 obesity with body mass index (BMI) of 31.0 to 31.9 in adult 05/21/2019    Acute back pain 01/30/2018     Past Surgical History:   Procedure Laterality Date    CHOLECYSTECTOMY      HYSTERECTOMY      THYROIDECTOMY Bilateral 6/29/2020    TOTAL THYROIDECTOMY performed by Shilpi Chapa DO at [id number] [facility]       Family History   Adopted: Yes     Social History Socioeconomic History    Marital status:      Spouse name: None    Number of children: None    Years of education: None    Highest education level: None   Occupational History    None   Social Needs    Financial resource strain: None    Food insecurity     Worry: None     Inability: None    Transportation needs     Medical: None     Non-medical: None   Tobacco Use    Smoking status: Current Every Day Smoker     Packs/day: 0.25     Years: 25.00     Pack years: 6.25     Types: Cigarettes    Smokeless tobacco: Never Used    Tobacco comment: 3 cig a day   Substance and Sexual Activity    Alcohol use: Yes     Comment: socially    Drug use: No    Sexual activity: Yes     Partners: Male   Lifestyle    Physical activity     Days per week: None     Minutes per session: None    Stress: None   Relationships    Social connections     Talks on phone: None     Gets together: None     Attends Hoahaoism service: None     Active member of club or organization: None     Attends meetings of clubs or organizations: None     Relationship status: None    Intimate partner violence     Fear of current or ex partner: None     Emotionally abused: None     Physically abused: None     Forced sexual activity: None   Other Topics Concern    None   Social History Narrative    None     Current Outpatient Medications   Medication Sig Dispense Refill    levothyroxine (SYNTHROID) 112 MCG tablet Take 1 tablet by mouth Daily 30 tablet 2    omeprazole (PRILOSEC) 40 MG delayed release capsule Take 1 capsule by mouth daily (Patient not taking: Reported on 9/2/2020) 30 capsule 2    atenolol (TENORMIN) 25 MG tablet Take 1 tablet by mouth daily (Patient not taking: Reported on 10/21/2020) 30 tablet 3     No current facility-administered medications for this visit. No Known Allergies  No family status information on file.        Review of Systems:  Constitutional: has fatigue, no fever, no recent weight gain, has recent weight loss, no changes in appetite  Eyes: no eye pain, no change in vision, no eye redness, no eye irritation, no double vision  Ears, nose, throat: no nasal congestion, no sore throat, no earache, no decrease in hearing, hsa hoarseness, no dry mouth, no sinus problems, no difficulty swallowing, no neck lumps, no dental problems, no mouth sores, no ringing in ears  Pulmonary: has shortness of breath, no wheezing, no dyspnea on exertion, has cough  Cardiovascular: has chest pain, has lower extremity edema, no orthopnea, no intermittent leg claudication, has palpitations  Gastrointestinal: no abdominal pain, no nausea, no vomiting, has diarrhea, no constipation, no dysphagia, no heartburn, no bloating  Genitourinary: no dysuria, no urinary incontinence, no urinary hesitancy, no urinary frequency, no feelings of urinary urgency, has nocturia  Musculoskeletal: no joint swelling, no joint stiffness, no joint pain, no muscle cramps, has muscle pain, no bone pain  Integument/Breast: no hair loss, no skin rashes, no skin lesions, no itching, no dry skin  Neurological: no numbness, no tingling, no weakness, no confusion, has headaches, has dizziness, no fainting, no tremors, no decrease in memory, no balance problems  Psychiatric: has anxiety, no depression, no insomnia  Hematologic/Lymphatic: no tendency for easy bleeding, no swollen lymph nodes, no tendency for easy bruising  Immunology: no seasonal allergies, no frequent infections, no frequent illnesses  Endocrine: no temperature intolerance, has hand tremor    /66   Pulse 91   Temp 97.9 °F (36.6 °C)   Resp 14   Ht 5' 7\" (1.702 m)   Wt 197 lb 9.6 oz (89.6 kg)   SpO2 96%   BMI 30.95 kg/m²    Wt Readings from Last 3 Encounters:   10/21/20 197 lb 9.6 oz (89.6 kg)   09/02/20 189 lb (85.7 kg)   08/07/20 185 lb (83.9 kg)     Body mass index is 30.95 kg/m².     OBJECTIVE:  Constitutional: no acute distress, well appearing and well nourished  Psychiatric: oriented to person, place and time, judgement and insight and normal, recent and remote memory and intact and mood and affect are normal  Skin: skin and subcutaneous tissue is normal without mass, normal turgor  Head and Face: examination of head and face revealed no abnormalities  Eyes: no lid or conjunctival swelling, erythema or discharge, pupils are normal, equal, round, reactive to light  Ears/Nose: external inspection of ears and nose revealed no abnormalities, hearing is grossly normal  Oropharynx/Mouth/Face: lips, tongue and gums are normal with no lesions, the voice quality was normal  Neck: neck is supple and symmetric, with midline trachea and no masses, thyroid is absent  Lymphatics: normal cervical lymph nodes, normal supraclavicular nodes  Pulmonary: no increased work of breathing or signs of respiratory distress, lungs are clear to auscultation  Cardiovascular: normal heart rate and rhythm, normal S1 and S2, no murmurs and pedal pulses and 2+ bilaterally, No edema  Abdomen: abdomen is soft, non-tender with no masses  Musculoskeletal: normal gait and station and exam of the digits and nails are normal  Neurological: normal coordination and normal general cortical function, no hand tremor      Lab Review:    Lab Results   Component Value Date    WBC 8.6 06/30/2020    HGB 12.8 06/30/2020    HCT 39.3 06/30/2020    MCV 87.0 06/30/2020     06/30/2020     Lab Results   Component Value Date     10/19/2020    K 4.1 10/19/2020     10/19/2020    CO2 26 10/19/2020    BUN 19 10/19/2020    CREATININE 0.8 10/19/2020    GLUCOSE 84 10/19/2020    CALCIUM 8.8 10/19/2020    PROT 6.4 10/19/2020    LABALBU 4.0 10/19/2020    BILITOT <0.2 10/19/2020    ALKPHOS 166 10/19/2020    AST 17 10/19/2020    ALT 14 10/19/2020    LABGLOM >60 10/19/2020    GFRAA >60 10/19/2020    AGRATIO 1.7 10/19/2020    GLOB 2.4 10/19/2020     Lab Results   Component Value Date    TSH 16.85 10/19/2020    FT3 1.9 08/28/2020     No results found for: LABA1C  No results found for: EAG  Lab Results   Component Value Date    CHOL 213 01/13/2017     Lab Results   Component Value Date    TRIG 166 01/13/2017     Lab Results   Component Value Date    HDL 46 01/13/2017     Lab Results   Component Value Date    LDLCALC 134 01/13/2017     Lab Results   Component Value Date    LABVLDL 33 01/13/2017     No results found for: [facility]  Lab Results   Component Value Date    LABMICR Yes 02/21/2018     Lab Results   Component Value Date    VITD25 49.8 07/29/2020        ASSESSMENT/PLAN:  1. Hypothyroidism  TSH 4.22-7.04-16.85  Uncontrolled  Worsening  Increase levothyroxine to 0.112 mg qd  - T3, Free; Future  - T4, Free; Future  - TSH without Reflex; Future    2. History of hyperthyroidism  PTH 60.9  Calcium 8.8  Albumin 4.0  Post surgery  FINAL DIAGNOSIS:   Total thyroid:   -   Diffuse hyperplasia (Grave's disease). -   Attached small lymph node is noted. -   No malignant tumor     3. Obesity  Diet, exercise.     4.  Graves' disease  No double vision, blurry vision      Reviewed and/or ordered clinical lab results Yes  Reviewed and/or ordered radiology tests Yes   Reviewed and/or ordered other diagnostic tests No  Discussed test results with performing physician No  Independently reviewed image, tracing, or specimen No  Made a decision to obtain old records No  Reviewed and summarized old records Yes  TSH less than 0.01  FT4 5.7  TSH 0.63 in 2017  Obtained history from other than patient No    Pretty Aranda was counseled regarding symptoms of thyroid diagnosis, course and complications of disease if inadequately treated, side effects of medications, diagnosis, treatment options, and prognosis, risks, benefits, complications, and alternatives of treatment, labs, imaging and other studies and treatment targets and goals, I-131 Tx, methimazole, side effects, remission, permanent hypothyroidism, Graves' eye disease, smoking cessations, risk of worsening of eye disease with X-284, surgery, complications. She understands instructions and counseling. Return in about 2 months (around 12/21/2020) for thyroid problems.

## 2020-12-16 ENCOUNTER — OFFICE VISIT (OUTPATIENT)
Dept: ENDOCRINOLOGY | Age: 55
End: 2020-12-16
Payer: COMMERCIAL

## 2020-12-16 VITALS
OXYGEN SATURATION: 97 % | DIASTOLIC BLOOD PRESSURE: 74 MMHG | WEIGHT: 200 LBS | BODY MASS INDEX: 31.39 KG/M2 | RESPIRATION RATE: 14 BRPM | HEART RATE: 80 BPM | HEIGHT: 67 IN | SYSTOLIC BLOOD PRESSURE: 115 MMHG

## 2020-12-16 PROCEDURE — 99214 OFFICE O/P EST MOD 30 MIN: CPT | Performed by: INTERNAL MEDICINE

## 2020-12-16 NOTE — PROGRESS NOTES
SUBJECTIVE:  Rosa White is a 63 y.o. female who is here for hypothyroidism. 1. Hypothyroidism    This started in 2020. Patient was diagnosed with postsurgical Hypothyroidism. The problem has been uncontrolled. Patient started medication in 2020. Currently patient is on: Levothyroxine. Misses 0 doses a month. Current complaints:  Fatigue, weight gain, heat intolerance, hair thinning, sweating, puffiness around eyes. Improved. 2.  History of hyperthyroidism    This started in 2019. Patient was diagnosed with hyperthyroidism. The problem had been gradually worsening. Previous thyroid studies include: TSH and free thyroxine. Patient started methimazole in 7/2019. Patient had severe nausea on methimazole. Stopped after surgery. History of obstructive symptoms: difficulty swallowing NO, changes in voice/hoarseness Yes. History of radiation to patient's neck: No  Resent iodine exposure: No  Family history includes unknown  Family history of thyroid cancer: unknown    Date: 6/29/2020TOTAL THYROIDECTOMY   Surgeon  Role    Nicho Hiceky DO       FINAL DIAGNOSIS:     Total thyroid:   -   Diffuse hyperplasia (Grave's disease). -   Attached small lymph node is noted. -   No malignant tumor is identified    3. Obesity  Lost 30 lbs over 5 months on diet, exercise. Now gained, thyroid is uncontrolled    4. Graves' disease  Positive antibodies        Thyroid uptake and scan.       HISTORY: Hyperthyroidism       COMPARISON: Thyroid ultrasound April 26, 2019.       228.4 uCi of I-123 was administered by mouth followed by thyroid uptake and scan       Homogeneous activity within the right and left lobes of the thyroid without hot or cold nodule.  The right lobe is larger than the left incidental.       The 6 mm nodule noted inferior pole right lobe on previous ultrasound examination is beyond the spatial resolution of the thyroid scan.       Uptake at 24 hours is 73% which is markedly elevated with normal between 10 and 35%.         Impression       Markedly increased uptake at 73.6% indeterminate though likely relates to diffuse toxic goiter/Graves' disease         THYROID ULTRASOUND       CLINICAL HISTORY: Goiter. Thyromegaly.        FINDINGS:  Ultrasound imaging of the thyroid gland was performed. The right left lobes of the thyroid gland are diffusely enlarged. The right lobe of the thyroid gland measures 6.1 x 2.8 x 2.4 cm. Left lobe of the thyroid gland measures 5.4 x 2.2 x 2.1    cm in size. Echotexture of the gland is mildly heterogeneous with some areas of lobulation. There is a small mixed echogenic nodule identified medially within the inferior pole of the right lobe of the thyroid measuring 0.6 x 0.5 x 0.4 cm.       There is increased Doppler flow identified within the thyroid gland.           Impression           1. Diffusely enlarged thyroid gland with hyperemia on Doppler flow. Thyromegaly is nonspecific but can be seen with Graves' disease, or Hashimoto's thyroiditis. Correlate with thyroid function tests. 2. Small thyroid nodule is identified at the inferior pole the right lobe of the thyroid gland.  Recommend sonographic follow-up in one year to confirm stability.       Order History         Past Medical History:   Diagnosis Date    Hypertension     Hyperthyroidism      Patient Active Problem List    Diagnosis Date Noted    Postoperative hypothyroidism 08/05/2020    History of hyperthyroidism 08/05/2020    Overweight (BMI 25.0-29.9) 08/05/2020    Graves' disease 06/29/2020    Smoking 10/30/2019    Class 1 obesity with body mass index (BMI) of 31.0 to 31.9 in adult 05/21/2019    Acute back pain 01/30/2018     Past Surgical History:   Procedure Laterality Date    CHOLECYSTECTOMY      HYSTERECTOMY      THYROIDECTOMY Bilateral 6/29/2020    TOTAL THYROIDECTOMY performed by Blue Bee DO at [id number] [facility]       Family History   Adopted: Yes     Social History Socioeconomic History    Marital status:      Spouse name: None    Number of children: None    Years of education: None    Highest education level: None   Occupational History    None   Social Needs    Financial resource strain: None    Food insecurity     Worry: None     Inability: None    Transportation needs     Medical: None     Non-medical: None   Tobacco Use    Smoking status: Current Every Day Smoker     Packs/day: 0.25     Years: 25.00     Pack years: 6.25     Types: Cigarettes    Smokeless tobacco: Never Used    Tobacco comment: 3 cig a day   Substance and Sexual Activity    Alcohol use: Yes     Comment: socially    Drug use: No    Sexual activity: Yes     Partners: Male   Lifestyle    Physical activity     Days per week: None     Minutes per session: None    Stress: None   Relationships    Social connections     Talks on phone: None     Gets together: None     Attends [employer] service: None     Active member of club or organization: None     Attends meetings of clubs or organizations: None     Relationship status: None    Intimate partner violence     Fear of current or ex partner: None     Emotionally abused: None     Physically abused: None     Forced sexual activity: None   Other Topics Concern    None   Social History Narrative    None     Current Outpatient Medications   Medication Sig Dispense Refill    levothyroxine (SYNTHROID) 112 MCG tablet Take 1 tablet by mouth Daily 30 tablet 2    omeprazole (PRILOSEC) 40 MG delayed release capsule Take 1 capsule by mouth daily (Patient not taking: Reported on 9/2/2020) 30 capsule 2    atenolol (TENORMIN) 25 MG tablet Take 1 tablet by mouth daily (Patient not taking: Reported on 10/21/2020) 30 tablet 3     No current facility-administered medications for this visit. No Known Allergies  No family status information on file.        Review of Systems:  Constitutional: has fatigue, no fever, no recent weight gain, has recent weight loss, no changes in appetite  Eyes: no eye pain, no change in vision, no eye redness, no eye irritation, no double vision  Ears, nose, throat: no nasal congestion, no sore throat, no earache, no decrease in hearing, hsa hoarseness, no dry mouth, no sinus problems, no difficulty swallowing, no neck lumps, no dental problems, no mouth sores, no ringing in ears  Pulmonary: has shortness of breath, no wheezing, no dyspnea on exertion, has cough  Cardiovascular: has chest pain, has lower extremity edema, no orthopnea, no intermittent leg claudication, has palpitations  Gastrointestinal: no abdominal pain, no nausea, no vomiting, has diarrhea, no constipation, no dysphagia, no heartburn, no bloating  Genitourinary: no dysuria, no urinary incontinence, no urinary hesitancy, no urinary frequency, no feelings of urinary urgency, has nocturia  Musculoskeletal: no joint swelling, no joint stiffness, no joint pain, no muscle cramps, has muscle pain, no bone pain  Integument/Breast: no hair loss, no skin rashes, no skin lesions, no itching, no dry skin  Neurological: no numbness, no tingling, no weakness, no confusion, has headaches, has dizziness, no fainting, no tremors, no decrease in memory, no balance problems  Psychiatric: has anxiety, no depression, no insomnia  Hematologic/Lymphatic: no tendency for easy bleeding, no swollen lymph nodes, no tendency for easy bruising  Immunology: no seasonal allergies, no frequent infections, no frequent illnesses  Endocrine: no temperature intolerance, has hand tremor    /74   Pulse 80   Resp 14   Ht 5' 7\" (1.702 m)   Wt 200 lb (90.7 kg)   SpO2 97%   BMI 31.32 kg/m²    Wt Readings from Last 3 Encounters:   12/16/20 200 lb (90.7 kg)   10/21/20 197 lb 9.6 oz (89.6 kg)   09/02/20 189 lb (85.7 kg)     Body mass index is 31.32 kg/m².     OBJECTIVE:  Constitutional: no acute distress, well appearing and well nourished  Psychiatric: oriented to person, place and time, judgement and insight and normal, recent and remote memory and intact and mood and affect are normal  Skin: skin and subcutaneous tissue is normal without mass, normal turgor  Head and Face: examination of head and face revealed no abnormalities  Eyes: no lid or conjunctival swelling, erythema or discharge, pupils are normal, equal, round, reactive to light  Ears/Nose: external inspection of ears and nose revealed no abnormalities, hearing is grossly normal  Oropharynx/Mouth/Face: lips, tongue and gums are normal with no lesions, the voice quality was normal  Neck: neck is supple and symmetric, with midline trachea and no masses, thyroid is absent  Lymphatics: normal cervical lymph nodes, normal supraclavicular nodes  Pulmonary: no increased work of breathing or signs of respiratory distress, lungs are clear to auscultation  Cardiovascular: normal heart rate and rhythm, normal S1 and S2, no murmurs and pedal pulses and 2+ bilaterally, No edema  Abdomen: abdomen is soft, non-tender with no masses  Musculoskeletal: normal gait and station and exam of the digits and nails are normal  Neurological: normal coordination and normal general cortical function, no hand tremor      Lab Review:    Lab Results   Component Value Date    WBC 8.6 06/30/2020    HGB 12.8 06/30/2020    HCT 39.3 06/30/2020    MCV 87.0 06/30/2020     06/30/2020     Lab Results   Component Value Date     10/19/2020    K 4.1 10/19/2020     10/19/2020    CO2 26 10/19/2020    BUN 19 10/19/2020    CREATININE 0.8 10/19/2020    GLUCOSE 84 10/19/2020    CALCIUM 8.8 10/19/2020    PROT 6.4 10/19/2020    LABALBU 4.0 10/19/2020    BILITOT <0.2 10/19/2020    ALKPHOS 166 10/19/2020    AST 17 10/19/2020    ALT 14 10/19/2020    LABGLOM >60 10/19/2020    GFRAA >60 10/19/2020    AGRATIO 1.7 10/19/2020    GLOB 2.4 10/19/2020     Lab Results   Component Value Date    TSH 16.85 10/19/2020    FT3 1.9 08/28/2020     No results found for: LABA1C  No results found for: EAG  Lab Results   Component Value Date    CHOL 213 01/13/2017     Lab Results   Component Value Date    TRIG 166 01/13/2017     Lab Results   Component Value Date    HDL 46 01/13/2017     Lab Results   Component Value Date    LDLCALC 134 01/13/2017     Lab Results   Component Value Date    LABVLDL 33 01/13/2017     No results found for: [facility]  Lab Results   Component Value Date    LABMICR Yes 02/21/2018     Lab Results   Component Value Date    VITD25 49.8 07/29/2020        ASSESSMENT/PLAN:  1. Hypothyroidism  Patient has not had labs recently because of pandemic. She will do lab work when feels comfortable. TSH 4.22-7.0416.85  Uncontrolled  Continue levothyroxine 0.112 mg qd  - T3, Free; Future  - T4, Free; Future  - TSH without Reflex; Future    2. History of hyperthyroidism  PTH 60.9  Calcium 8.8  Albumin 4.0  Post surgery  FINAL DIAGNOSIS:   Total thyroid:   -   Diffuse hyperplasia (Grave's disease). -   Attached small lymph node is noted. -   No malignant tumor     3. Obesity  Diet, exercise. 4.  Graves' disease  No double vision, blurry vision      Reviewed and/or ordered clinical lab results Yes  Reviewed and/or ordered radiology tests Yes   Reviewed and/or ordered other diagnostic tests No  Discussed test results with performing physician No  Independently reviewed image, tracing, or specimen No  Made a decision to obtain old records No  Reviewed and summarized old records Yes  TSH less than 0.01  FT4 5.7  TSH 0.63 in 2017  Obtained history from other than patient No    Anneliese Manjarrez was counseled regarding symptoms of thyroid diagnosis, course and complications of disease if inadequately treated, side effects of medications, diagnosis, treatment options, and prognosis, risks, benefits, complications, and alternatives of treatment, labs, imaging and other studies and treatment targets and goals, Graves' eye disease, postsurgical hypothyroidism, treatment.     She understands instructions and counseling. Return in about 3 months (around 3/16/2021) for thyroid problems.

## 2021-01-28 DIAGNOSIS — E89.0 POSTOPERATIVE HYPOTHYROIDISM: ICD-10-CM

## 2021-01-28 RX ORDER — LEVOTHYROXINE SODIUM 112 UG/1
112 TABLET ORAL DAILY
Qty: 30 TABLET | Refills: 2 | Status: SHIPPED | OUTPATIENT
Start: 2021-01-28 | End: 2021-03-17 | Stop reason: DRUGHIGH

## 2021-01-28 NOTE — TELEPHONE ENCOUNTER
Requested Prescriptions     Pending Prescriptions Disp Refills    levothyroxine (SYNTHROID) 112 MCG tablet 30 tablet 2     Sig: Take 1 tablet by mouth Daily         Last Ov: 12/16/2021  Next Ov: 3/17/2021

## 2021-03-06 ENCOUNTER — PATIENT MESSAGE (OUTPATIENT)
Dept: ENDOCRINOLOGY | Age: 56
End: 2021-03-06

## 2021-03-08 ENCOUNTER — TELEPHONE (OUTPATIENT)
Dept: ENDOCRINOLOGY | Age: 56
End: 2021-03-08

## 2021-03-08 NOTE — TELEPHONE ENCOUNTER
From: Sarah Laws  To: Charlie Guajardo MD  Sent: 3/6/2021 5:14 PM EST  Subject: Non-Urgent Golden Rajan, this is Buzz , hope this email finds you well. I'm having trouble with my eyes, and my left to be specific. I'm experiencing a lot of pressure and when I lift my eyelid up it's all bloody. When I lean over my eyes pound and throb and sensitive to light. My cell is [phone]. I've tried to attach a picture but it is giving me an error-does not have an allowed filename extension.

## 2021-03-08 NOTE — TELEPHONE ENCOUNTER
I spoke with patient. The problem started on Friday. It is not getting better. Vision is not compromised at this time. Advised to go to the ER for evaluation by ophthalmologist.  Patient has history of Graves' disease.

## 2021-03-08 NOTE — TELEPHONE ENCOUNTER
PT requests call back she states her thyroid was removed last July and she has graves disease and one of her eyes is throbbing and bloody looking.

## 2021-03-12 DIAGNOSIS — E89.0 POSTOPERATIVE HYPOTHYROIDISM: ICD-10-CM

## 2021-03-12 DIAGNOSIS — Z86.39 HISTORY OF HYPERTHYROIDISM: ICD-10-CM

## 2021-03-12 DIAGNOSIS — E05.00 GRAVES' DISEASE: ICD-10-CM

## 2021-03-12 LAB
A/G RATIO: 1.4 (ref 1.1–2.2)
ALBUMIN SERPL-MCNC: 4.1 G/DL (ref 3.4–5)
ALP BLD-CCNC: 126 U/L (ref 40–129)
ALT SERPL-CCNC: 11 U/L (ref 10–40)
ANION GAP SERPL CALCULATED.3IONS-SCNC: 9 MMOL/L (ref 3–16)
AST SERPL-CCNC: 14 U/L (ref 15–37)
BILIRUB SERPL-MCNC: 0.3 MG/DL (ref 0–1)
BUN BLDV-MCNC: 21 MG/DL (ref 7–20)
CALCIUM SERPL-MCNC: 8.7 MG/DL (ref 8.3–10.6)
CHLORIDE BLD-SCNC: 104 MMOL/L (ref 99–110)
CO2: 27 MMOL/L (ref 21–32)
CREAT SERPL-MCNC: 0.8 MG/DL (ref 0.6–1.1)
GFR AFRICAN AMERICAN: >60
GFR NON-AFRICAN AMERICAN: >60
GLOBULIN: 2.9 G/DL
GLUCOSE BLD-MCNC: 106 MG/DL (ref 70–99)
POTASSIUM SERPL-SCNC: 4.2 MMOL/L (ref 3.5–5.1)
SODIUM BLD-SCNC: 140 MMOL/L (ref 136–145)
T3 FREE: 1.8 PG/ML (ref 2.3–4.2)
T3 TOTAL: 0.65 NG/ML (ref 0.8–2)
T4 FREE: 1.1 NG/DL (ref 0.9–1.8)
T4 TOTAL: 7.1 UG/DL (ref 4.5–10.9)
TOTAL PROTEIN: 7 G/DL (ref 6.4–8.2)
TSH SERPL DL<=0.05 MIU/L-ACNC: 14.44 UIU/ML (ref 0.27–4.2)

## 2021-03-17 ENCOUNTER — OFFICE VISIT (OUTPATIENT)
Dept: ENDOCRINOLOGY | Age: 56
End: 2021-03-17
Payer: COMMERCIAL

## 2021-03-17 VITALS
BODY MASS INDEX: 31.86 KG/M2 | DIASTOLIC BLOOD PRESSURE: 74 MMHG | OXYGEN SATURATION: 96 % | WEIGHT: 203 LBS | HEART RATE: 78 BPM | SYSTOLIC BLOOD PRESSURE: 124 MMHG | HEIGHT: 67 IN

## 2021-03-17 DIAGNOSIS — Z86.39 HISTORY OF HYPERTHYROIDISM: ICD-10-CM

## 2021-03-17 DIAGNOSIS — E89.0 POSTOPERATIVE HYPOTHYROIDISM: Primary | ICD-10-CM

## 2021-03-17 DIAGNOSIS — E66.9 CLASS 1 OBESITY WITH BODY MASS INDEX (BMI) OF 31.0 TO 31.9 IN ADULT, UNSPECIFIED OBESITY TYPE, UNSPECIFIED WHETHER SERIOUS COMORBIDITY PRESENT: ICD-10-CM

## 2021-03-17 DIAGNOSIS — E05.00 GRAVES' DISEASE: ICD-10-CM

## 2021-03-17 PROCEDURE — 99214 OFFICE O/P EST MOD 30 MIN: CPT | Performed by: INTERNAL MEDICINE

## 2021-03-17 RX ORDER — LEVOTHYROXINE SODIUM 137 MCG
137 TABLET ORAL
Qty: 90 TABLET | Refills: 3 | Status: SHIPPED | OUTPATIENT
Start: 2021-03-17 | End: 2022-08-11 | Stop reason: SDUPTHER

## 2021-03-17 NOTE — PROGRESS NOTES
SUBJECTIVE:  Gonzalo Tse is a 63 y.o. female who is here for hypothyroidism. 1. Hypothyroidism    This started in 2020. Patient was diagnosed with postsurgical Hypothyroidism. The problem has been uncontrolled. Patient started medication in 2020. Currently patient is on: Levothyroxine. Misses 0 doses a month. Current complaints:  fatigue, weight gain, heat intolerance, puffiness around eyes. Improved. 2.  History of hyperthyroidism    This started in 2019. Patient was diagnosed with hyperthyroidism. The problem had been gradually worsening. Previous thyroid studies include: TSH and free thyroxine. Patient started methimazole in 7/2019. Patient had severe nausea on methimazole. Stopped after surgery. History of obstructive symptoms: difficulty swallowing No, changes in voice/hoarseness Yes. History of radiation to patient's neck: No  Resent iodine exposure: No  Family history includes unknown  Family history of thyroid cancer: unknown    Date: 6/29/2020TOTAL THYROIDECTOMY   Surgeon  Role    Jessie Lagos DO       FINAL DIAGNOSIS:     Total thyroid:   -   Diffuse hyperplasia (Grave's disease). -   Attached small lymph node is noted. -   No malignant tumor is identified    3. Obesity  Lost 30 lbs over 5 months on diet, exercise. Now gained, thyroid is uncontrolled    4. Graves' disease  Positive antibodies        Thyroid uptake and scan.       HISTORY: Hyperthyroidism       COMPARISON: Thyroid ultrasound April 26, 2019.       228.4 uCi of I-123 was administered by mouth followed by thyroid uptake and scan       Homogeneous activity within the right and left lobes of the thyroid without hot or cold nodule. The right lobe is larger than the left incidental.       The 6 mm nodule noted inferior pole right lobe on previous ultrasound examination is beyond the spatial resolution of the thyroid scan.       Uptake at 24 hours is 73% which is markedly elevated with normal between 10 and 35%.         Impression       Markedly increased uptake at 73.6% indeterminate though likely relates to diffuse toxic goiter/Graves' disease         THYROID ULTRASOUND       CLINICAL HISTORY: Goiter. Thyromegaly.        FINDINGS:  Ultrasound imaging of the thyroid gland was performed. The right left lobes of the thyroid gland are diffusely enlarged. The right lobe of the thyroid gland measures 6.1 x 2.8 x 2.4 cm. Left lobe of the thyroid gland measures 5.4 x 2.2 x 2.1    cm in size. Echotexture of the gland is mildly heterogeneous with some areas of lobulation. There is a small mixed echogenic nodule identified medially within the inferior pole of the right lobe of the thyroid measuring 0.6 x 0.5 x 0.4 cm.       There is increased Doppler flow identified within the thyroid gland.           Impression           1. Diffusely enlarged thyroid gland with hyperemia on Doppler flow. Thyromegaly is nonspecific but can be seen with Graves' disease, or Hashimoto's thyroiditis. Correlate with thyroid function tests. 2. Small thyroid nodule is identified at the inferior pole the right lobe of the thyroid gland.  Recommend sonographic follow-up in one year to confirm stability.       Order History         Past Medical History:   Diagnosis Date    Hypertension     Hyperthyroidism      Patient Active Problem List    Diagnosis Date Noted    Postoperative hypothyroidism 08/05/2020    History of hyperthyroidism 08/05/2020    Overweight (BMI 25.0-29.9) 08/05/2020    Graves' disease 06/29/2020    Smoking 10/30/2019    Class 1 obesity with body mass index (BMI) of 31.0 to 31.9 in adult 05/21/2019    Acute back pain 01/30/2018     Past Surgical History:   Procedure Laterality Date    CHOLECYSTECTOMY      HYSTERECTOMY      THYROIDECTOMY Bilateral 6/29/2020    TOTAL THYROIDECTOMY performed by Donna Haley DO at [id number] [facility]       Family History   Adopted: Yes     Social History     Socioeconomic History    Marital status:      Spouse name: None    Number of children: None    Years of education: None    Highest education level: None   Occupational History    None   Social Needs    Financial resource strain: None    Food insecurity     Worry: None     Inability: None    Transportation needs     Medical: None     Non-medical: None   Tobacco Use    Smoking status: Current Every Day Smoker     Packs/day: 0.25     Years: 25.00     Pack years: 6.25     Types: Cigarettes    Smokeless tobacco: Never Used    Tobacco comment: 3 cig a day   Substance and Sexual Activity    Alcohol use: Yes     Comment: socially    Drug use: No    Sexual activity: Yes     Partners: Male   Lifestyle    Physical activity     Days per week: None     Minutes per session: None    Stress: None   Relationships    Social connections     Talks on phone: None     Gets together: None     Attends Mu-ism service: None     Active member of club or organization: None     Attends meetings of clubs or organizations: None     Relationship status: None    Intimate partner violence     Fear of current or ex partner: None     Emotionally abused: None     Physically abused: None     Forced sexual activity: None   Other Topics Concern    None   Social History Narrative    None     Current Outpatient Medications   Medication Sig Dispense Refill    SYNTHROID 137 MCG tablet Take 1 tablet by mouth every morning (before breakfast) 90 tablet 3    omeprazole (PRILOSEC) 40 MG delayed release capsule Take 1 capsule by mouth daily (Patient not taking: Reported on 9/2/2020) 30 capsule 2    atenolol (TENORMIN) 25 MG tablet Take 1 tablet by mouth daily (Patient not taking: Reported on 10/21/2020) 30 tablet 3     No current facility-administered medications for this visit. No Known Allergies  No family status information on file.        Review of Systems:  Constitutional: has fatigue, no fever, no recent weight gain, has recent weight loss, no changes in appetite  Eyes: no eye pain, no change in vision, no eye redness, no eye irritation, no double vision, has lid lag, stare or proptosis, left eye worse  Ears, nose, throat: no nasal congestion, no sore throat, no earache, no decrease in hearing, hsa hoarseness, no dry mouth, no sinus problems, no difficulty swallowing, no neck lumps, no dental problems, no mouth sores, no ringing in ears  Pulmonary: has shortness of breath, no wheezing, no dyspnea on exertion, has cough  Cardiovascular: has chest pain, has lower extremity edema, no orthopnea, no intermittent leg claudication, has palpitations  Gastrointestinal: no abdominal pain, no nausea, no vomiting, has diarrhea, no constipation, no dysphagia, no heartburn, no bloating  Genitourinary: no dysuria, no urinary incontinence, no urinary hesitancy, no urinary frequency, no feelings of urinary urgency, has nocturia  Musculoskeletal: no joint swelling, no joint stiffness, no joint pain, no muscle cramps, has muscle pain, no bone pain  Integument/Breast: no hair loss, no skin rashes, no skin lesions, no itching, no dry skin  Neurological: no numbness, no tingling, no weakness, no confusion, has headaches, has dizziness, no fainting, no tremors, no decrease in memory, no balance problems  Psychiatric: has anxiety, no depression, no insomnia  Hematologic/Lymphatic: no tendency for easy bleeding, no swollen lymph nodes, no tendency for easy bruising  Immunology: no seasonal allergies, no frequent infections, no frequent illnesses  Endocrine: no temperature intolerance, has hand tremor    /74   Pulse 78   Ht 5' 7\" (1.702 m)   Wt 203 lb (92.1 kg)   SpO2 96%   BMI 31.79 kg/m²    Wt Readings from Last 3 Encounters:   03/17/21 203 lb (92.1 kg)   12/16/20 200 lb (90.7 kg)   10/21/20 197 lb 9.6 oz (89.6 kg)     Body mass index is 31.79 kg/m².     OBJECTIVE:  Constitutional: no acute distress, well appearing and well nourished  Psychiatric: oriented to person, place and time, judgement and insight and normal, recent and remote memory and intact and mood and affect are normal  Skin: skin and subcutaneous tissue is normal without mass, normal turgor  Head and Face: examination of head and face revealed no abnormalities  Eyes: no lid or conjunctival swelling, erythema or discharge, pupils are normal, equal, round, reactive to light  Ears/Nose: external inspection of ears and nose revealed no abnormalities, hearing is grossly normal  Oropharynx/Mouth/Face: lips, tongue and gums are normal with no lesions, the voice quality was normal  Neck: neck is supple and symmetric, with midline trachea and no masses, thyroid is absent  Lymphatics: normal cervical lymph nodes, normal supraclavicular nodes  Pulmonary: no increased work of breathing or signs of respiratory distress, lungs are clear to auscultation  Cardiovascular: normal heart rate and rhythm, normal S1 and S2, no murmurs and pedal pulses and 2+ bilaterally, No edema  Abdomen: abdomen is soft, non-tender with no masses  Musculoskeletal: normal gait and station and exam of the digits and nails are normal  Neurological: normal coordination and normal general cortical function, no hand tremor      Lab Review:    Lab Results   Component Value Date    WBC 8.6 06/30/2020    HGB 12.8 06/30/2020    HCT 39.3 06/30/2020    MCV 87.0 06/30/2020     06/30/2020     Lab Results   Component Value Date     03/12/2021    K 4.2 03/12/2021     03/12/2021    CO2 27 03/12/2021    BUN 21 03/12/2021    CREATININE 0.8 03/12/2021    GLUCOSE 106 03/12/2021    CALCIUM 8.7 03/12/2021    PROT 7.0 03/12/2021    LABALBU 4.1 03/12/2021    BILITOT 0.3 03/12/2021    ALKPHOS 126 03/12/2021    AST 14 03/12/2021    ALT 11 03/12/2021    LABGLOM >60 03/12/2021    GFRAA >60 03/12/2021    AGRATIO 1.4 03/12/2021    GLOB 2.9 03/12/2021     Lab Results   Component Value Date    TSH 14.44 03/12/2021    FT3 1.8 03/12/2021     No results found for: (around 6/17/2021) for thyroid problems.

## 2021-06-16 ENCOUNTER — OFFICE VISIT (OUTPATIENT)
Dept: ENDOCRINOLOGY | Age: 56
End: 2021-06-16
Payer: COMMERCIAL

## 2021-06-16 VITALS
OXYGEN SATURATION: 98 % | SYSTOLIC BLOOD PRESSURE: 125 MMHG | DIASTOLIC BLOOD PRESSURE: 80 MMHG | HEART RATE: 79 BPM | WEIGHT: 192 LBS | HEIGHT: 67 IN | BODY MASS INDEX: 30.13 KG/M2

## 2021-06-16 DIAGNOSIS — E05.00 GRAVES' DISEASE: ICD-10-CM

## 2021-06-16 DIAGNOSIS — E66.9 CLASS 1 OBESITY WITH BODY MASS INDEX (BMI) OF 31.0 TO 31.9 IN ADULT, UNSPECIFIED OBESITY TYPE, UNSPECIFIED WHETHER SERIOUS COMORBIDITY PRESENT: ICD-10-CM

## 2021-06-16 DIAGNOSIS — Z86.39 HISTORY OF HYPERTHYROIDISM: ICD-10-CM

## 2021-06-16 DIAGNOSIS — E07.9 THYROID EYE DISEASE: ICD-10-CM

## 2021-06-16 DIAGNOSIS — E89.0 POSTOPERATIVE HYPOTHYROIDISM: Primary | ICD-10-CM

## 2021-06-16 DIAGNOSIS — H57.89 THYROID EYE DISEASE: ICD-10-CM

## 2021-06-16 PROCEDURE — 99214 OFFICE O/P EST MOD 30 MIN: CPT | Performed by: INTERNAL MEDICINE

## 2021-06-16 NOTE — PROGRESS NOTES
SUBJECTIVE:  Humphrey Betancourt is a 63 y.o. female who is here for hypothyroidism. 1. Hypothyroidism    This started in 2020. Patient was diagnosed with postsurgical Hypothyroidism. The problem has been uncontrolled. Patient started medication in 2020. Currently patient is on: Levothyroxine. Misses 0 doses a month. Current complaints:  fatigue, weight gain, heat intolerance, puffiness around eyes. Improved. 2.  History of hyperthyroidism    This started in 2019. Patient was diagnosed with hyperthyroidism. The problem had been gradually worsening. Previous thyroid studies include: TSH and free thyroxine. Patient started methimazole in 7/2019. Patient had severe nausea on methimazole. Stopped after surgery. History of obstructive symptoms: difficulty swallowing No, changes in voice/hoarseness Yes. History of radiation to patient's neck: No  Resent iodine exposure: No  Family history includes unknown  Family history of thyroid cancer: unknown    Date: 6/29/2020TOTAL THYROIDECTOMY   Surgeon  Role    Danis Johnson DO       FINAL DIAGNOSIS:     Total thyroid:   -   Diffuse hyperplasia (Grave's disease). -   Attached small lymph node is noted. -   No malignant tumor is identified    3. Obesity  Lost 30 lbs over 5 months on diet, exercise. Now gained, thyroid is uncontrolled    4. Graves' disease  Positive antibodies  Has puffiness around her eyes    5. Thyroid eye disease   Has puffiness, dry eyes    Thyroid uptake and scan.       HISTORY: Hyperthyroidism       COMPARISON: Thyroid ultrasound April 26, 2019.       228.4 uCi of I-123 was administered by mouth followed by thyroid uptake and scan       Homogeneous activity within the right and left lobes of the thyroid without hot or cold nodule.  The right lobe is larger than the left incidental.       The 6 mm nodule noted inferior pole right lobe on previous ultrasound examination is beyond the spatial resolution of the thyroid scan.       Uptake at 24 hours is 73% which is markedly elevated with normal between 10 and 35%.         Impression       Markedly increased uptake at 73.6% indeterminate though likely relates to diffuse toxic goiter/Graves' disease         THYROID ULTRASOUND       CLINICAL HISTORY: Goiter. Thyromegaly.        FINDINGS:  Ultrasound imaging of the thyroid gland was performed. The right left lobes of the thyroid gland are diffusely enlarged. The right lobe of the thyroid gland measures 6.1 x 2.8 x 2.4 cm. Left lobe of the thyroid gland measures 5.4 x 2.2 x 2.1    cm in size. Echotexture of the gland is mildly heterogeneous with some areas of lobulation. There is a small mixed echogenic nodule identified medially within the inferior pole of the right lobe of the thyroid measuring 0.6 x 0.5 x 0.4 cm.       There is increased Doppler flow identified within the thyroid gland.           Impression           1. Diffusely enlarged thyroid gland with hyperemia on Doppler flow. Thyromegaly is nonspecific but can be seen with Graves' disease, or Hashimoto's thyroiditis. Correlate with thyroid function tests. 2. Small thyroid nodule is identified at the inferior pole the right lobe of the thyroid gland.  Recommend sonographic follow-up in one year to confirm stability.       Order History         Past Medical History:   Diagnosis Date    Hypertension     Hyperthyroidism      Patient Active Problem List    Diagnosis Date Noted    Thyroid eye disease 06/16/2021    Postoperative hypothyroidism 08/05/2020    History of hyperthyroidism 08/05/2020    Overweight (BMI 25.0-29.9) 08/05/2020    Graves' disease 06/29/2020    Smoking 10/30/2019    Class 1 obesity with body mass index (BMI) of 31.0 to 31.9 in adult 05/21/2019    Acute back pain 01/30/2018     Past Surgical History:   Procedure Laterality Date    CHOLECYSTECTOMY      HYSTERECTOMY      THYROIDECTOMY Bilateral 6/29/2020    TOTAL TH[facility]IDECT[facility] Sussy Colón [facility]    TUBAL LIGATION       Family History   Adopted: Yes     Social History     Socioeconomic History    Marital status:      Spouse name: None    Number of children: None    Years of education: None    Highest education level: None   Occupational History    None   Tobacco Use    Smoking status: Current Every Day Smoker     Packs/day: 0.25     Years: 25.00     Pack years: 6.25     Types: Cigarettes    Smokeless tobacco: Never Used    Tobacco comment: 3 cig a day   Vaping Use    Vaping Use: Former   Substance and Sexual Activity    Alcohol use: Yes     Comment: socially    Drug use: No    Sexual activity: Yes     Partners: Male   Other Topics Concern    None   Social History Narrative    None     Social Determinants of Health     Financial Resource Strain:     Difficulty of Paying Living Expenses:    Food Insecurity:     Worried About Running Out of Food in the Last Year:     Ran Out of Food in the Last Year:    Transportation Needs:     Lack of Transportation (Medical):  Lack of Transportation (Non-Medical):    Physical Activity:     Days of Exercise per Week:     Minutes of Exercise per Session:    Stress:     Feeling of Stress :    Social Connections:     Frequency of Communication with Friends and Family:     Frequency of Social Gatherings with Friends and Family:     Attends Gnosticist Services:     Active Member of Clubs or Organizations:     Attends Club or Organization Meetings:     Marital Status:    Intimate Partner Violence:     Fear of Current or Ex-Partner:     Emotionally Abused:     Physically Abused:     Sexually Abused:      Current Outpatient Medications   Medication Sig Dispense Refill    SYNTHROID 137 MCG tablet Take 1 tablet by mouth every morning (before breakfast) 90 tablet 3     No current facility-administered medications for this visit. No Known Allergies  No family status information on file.        Review of Systems:  Constitutional: has fatigue, no fever, no recent weight gain, has recent weight loss, no changes in appetite  Eyes: no eye pain, no change in vision, no eye redness, no eye irritation, no double vision, has lid lag, stare or proptosis, left eye worse  Ears, nose, throat: no nasal congestion, no sore throat, no earache, no decrease in hearing, hsa hoarseness, no dry mouth, no sinus problems, no difficulty swallowing, no neck lumps, no dental problems, no mouth sores, no ringing in ears  Pulmonary: has shortness of breath, no wheezing, no dyspnea on exertion, has cough  Cardiovascular: has chest pain, has lower extremity edema, no orthopnea, no intermittent leg claudication, has palpitations  Gastrointestinal: no abdominal pain, no nausea, no vomiting, has diarrhea, no constipation, no dysphagia, no heartburn, no bloating  Genitourinary: no dysuria, no urinary incontinence, no urinary hesitancy, no urinary frequency, no feelings of urinary urgency, has nocturia  Musculoskeletal: no joint swelling, no joint stiffness, no joint pain, no muscle cramps, has muscle pain, no bone pain  Integument/Breast: no hair loss, no skin rashes, no skin lesions, no itching, no dry skin  Neurological: no numbness, no tingling, no weakness, no confusion, has headaches, has dizziness, no fainting, no tremors, no decrease in memory, no balance problems  Psychiatric: has anxiety, no depression, no insomnia  Hematologic/Lymphatic: no tendency for easy bleeding, no swollen lymph nodes, no tendency for easy bruising  Immunology: no seasonal allergies, no frequent infections, no frequent illnesses  Endocrine: no temperature intolerance, has hand tremor    /80   Pulse 79   Ht 5' 7\" (1.702 m)   Wt 192 lb (87.1 kg)   SpO2 98%   BMI 30.07 kg/m²    Wt Readings from Last 3 Encounters:   06/16/21 192 lb (87.1 kg)   03/17/21 203 lb (92.1 kg)   12/16/20 200 lb (90.7 kg)     Body mass index is 30.07 kg/m².     OBJECTIVE:  Constitutional: no acute distress, well appearing and well nourished  Psychiatric: oriented to person, place and time, judgement and insight and normal, recent and remote memory and intact and mood and affect are normal  Skin: skin and subcutaneous tissue is normal without mass, normal turgor  Head and Face: examination of head and face revealed no abnormalities  Eyes: no lid or conjunctival swelling, erythema or discharge, pupils are normal, equal, round, reactive to light, has lid lag, stare, proptosis  Ears/Nose: external inspection of ears and nose revealed no abnormalities, hearing is grossly normal  Oropharynx/Mouth/Face: lips, tongue and gums are normal with no lesions, the voice quality was normal  Neck: neck is supple and symmetric, with midline trachea and no masses, thyroid is absent  Lymphatics: normal cervical lymph nodes, normal supraclavicular nodes  Pulmonary: no increased work of breathing or signs of respiratory distress, lungs are clear to auscultation  Cardiovascular: normal heart rate and rhythm, normal S1 and S2, no murmurs and pedal pulses and 2+ bilaterally, No edema  Abdomen: abdomen is soft, non-tender with no masses  Musculoskeletal: normal gait and station and exam of the digits and nails are normal  Neurological: normal coordination and normal general cortical function, no hand tremor      Lab Review:    Lab Results   Component Value Date    WBC 8.6 06/30/2020    HGB 12.8 06/30/2020    HCT 39.3 06/30/2020    MCV 87.0 06/30/2020     06/30/2020     Lab Results   Component Value Date     03/12/2021    K 4.2 03/12/2021     03/12/2021    CO2 27 03/12/2021    BUN 21 03/12/2021    CREATININE 0.8 03/12/2021    GLUCOSE 106 03/12/2021    CALCIUM 8.7 03/12/2021    PROT 7.0 03/12/2021    LABALBU 4.1 03/12/2021    BILITOT 0.3 03/12/2021    ALKPHOS 126 03/12/2021    AST 14 03/12/2021    ALT 11 03/12/2021    LABGLOM >60 03/12/2021    GFRAA >60 03/12/2021    AGRATIO 1.4 03/12/2021    GLOB 2.9 03/12/2021     Lab Results Component Value Date    TSH 14.44 03/12/2021    FT3 1.8 03/12/2021     No results found for: LABA1C  No results found for: EAG  Lab Results   Component Value Date    CHOL 213 01/13/2017     Lab Results   Component Value Date    TRIG 166 01/13/2017     Lab Results   Component Value Date    HDL 46 01/13/2017     Lab Results   Component Value Date    LDLCALC 134 01/13/2017     Lab Results   Component Value Date    LABVLDL 33 01/13/2017     No results found for: [facility]  Lab Results   Component Value Date    LABMICR Yes 02/21/2018     Lab Results   Component Value Date    VITD25 49.8 07/29/2020        ASSESSMENT/PLAN:  1. Hypothyroidism  Uncontrolled  Feels slightly better on new dose  Call for results  TSH 4.22-7.0416.8514.44  Increased Synthroid 0.137 mg daily last lachelle  - T3, Free; Future  - T4, Free; Future  - TSH without Reflex; Future    2. History of hyperthyroidism  PTH 60.9  Calcium 8.8-8.7  Albumin 4.0  Post surgery  FINAL DIAGNOSIS:   Total thyroid:   -   Diffuse hyperplasia (Grave's disease). -   Attached small lymph node is noted. -   No malignant tumor     3. Obesity  Diet, exercise. 4.  Graves' disease  No double vision, blurry vision    5.  Thyroid eye disease   Follow with Ophalmology      Reviewed and/or ordered clinical lab results Yes  Reviewed and/or ordered radiology tests Yes   Reviewed and/or ordered other diagnostic tests No  Discussed test results with performing physician No  Independently reviewed image, tracing, or specimen No  Made a decision to obtain old records No  Reviewed and summarized old records Yes  TSH less than 0.01  FT4 5.7  TSH 0.63 in 2017  Obtained history from other than patient No    Ailene Meigs was counseled regarding symptoms of thyroid diagnosis, course and complications of disease if inadequately treated, side effects of medications, diagnosis, treatment options, and prognosis, risks, benefits, complications, and alternatives of treatment, labs,

## 2021-09-16 ENCOUNTER — HOSPITAL ENCOUNTER (OUTPATIENT)
Dept: CT IMAGING | Age: 56
Discharge: HOME | End: 2021-09-16
Payer: COMMERCIAL

## 2021-09-16 DIAGNOSIS — E05.00 BASEDOW'S DISEASE: ICD-10-CM

## 2021-09-16 DIAGNOSIS — H05.20 EXOPHTHALMUS: ICD-10-CM

## 2021-09-16 PROCEDURE — 70482 CT ORBIT/EAR/FOSSA W/O&W/DYE: CPT

## 2021-09-16 PROCEDURE — 6360000004 HC RX CONTRAST MEDICATION: Performed by: OPHTHALMOLOGY

## 2021-09-16 RX ADMIN — IOPAMIDOL 80 ML: 755 INJECTION, SOLUTION INTRAVENOUS at 16:08

## 2021-10-13 ENCOUNTER — OFFICE VISIT (OUTPATIENT)
Dept: PRIMARY CARE CLINIC | Age: 56
End: 2021-10-13
Payer: COMMERCIAL

## 2021-10-13 VITALS
WEIGHT: 199 LBS | DIASTOLIC BLOOD PRESSURE: 71 MMHG | HEIGHT: 66 IN | TEMPERATURE: 98.4 F | BODY MASS INDEX: 31.98 KG/M2 | OXYGEN SATURATION: 98 % | SYSTOLIC BLOOD PRESSURE: 118 MMHG | HEART RATE: 90 BPM

## 2021-10-13 DIAGNOSIS — Z12.11 SCREENING FOR COLON CANCER: ICD-10-CM

## 2021-10-13 DIAGNOSIS — E07.9 THYROID EYE DISEASE: ICD-10-CM

## 2021-10-13 DIAGNOSIS — H57.89 THYROID EYE DISEASE: ICD-10-CM

## 2021-10-13 DIAGNOSIS — E89.0 POSTOPERATIVE HYPOTHYROIDISM: ICD-10-CM

## 2021-10-13 DIAGNOSIS — Z00.00 ROUTINE PHYSICAL EXAMINATION: Primary | ICD-10-CM

## 2021-10-13 DIAGNOSIS — Z12.31 ENCOUNTER FOR SCREENING MAMMOGRAM FOR MALIGNANT NEOPLASM OF BREAST: ICD-10-CM

## 2021-10-13 DIAGNOSIS — Z23 FLU VACCINE NEED: ICD-10-CM

## 2021-10-13 PROBLEM — Z86.39 HISTORY OF HYPERTHYROIDISM: Status: RESOLVED | Noted: 2020-08-05 | Resolved: 2021-10-13

## 2021-10-13 PROBLEM — E66.3 OVERWEIGHT (BMI 25.0-29.9): Status: RESOLVED | Noted: 2020-08-05 | Resolved: 2021-10-13

## 2021-10-13 PROCEDURE — 90688 IIV4 VACCINE SPLT 0.5 ML IM: CPT | Performed by: FAMILY MEDICINE

## 2021-10-13 PROCEDURE — 90471 IMMUNIZATION ADMIN: CPT | Performed by: FAMILY MEDICINE

## 2021-10-13 PROCEDURE — 99396 PREV VISIT EST AGE 40-64: CPT | Performed by: FAMILY MEDICINE

## 2021-10-13 ASSESSMENT — ENCOUNTER SYMPTOMS
DIARRHEA: 0
SHORTNESS OF BREATH: 0
VOMITING: 0
NAUSEA: 0
ABDOMINAL PAIN: 0

## 2021-10-13 ASSESSMENT — PATIENT HEALTH QUESTIONNAIRE - PHQ9
SUM OF ALL RESPONSES TO PHQ QUESTIONS 1-9: 0
1. LITTLE INTEREST OR PLEASURE IN DOING THINGS: 0
2. FEELING DOWN, DEPRESSED OR HOPELESS: 0
SUM OF ALL RESPONSES TO PHQ QUESTIONS 1-9: 0
SUM OF ALL RESPONSES TO PHQ9 QUESTIONS 1 & 2: 0
SUM OF ALL RESPONSES TO PHQ QUESTIONS 1-9: 0

## 2021-10-13 NOTE — PROGRESS NOTES
Vaccine Information Sheet, \"Influenza - Inactivated\"  given to Jonas Reynaga, or parent/legal guardian of  Jonas Reynaga and verbalized understanding. Patient responses:    Have you ever had a reaction to a flu vaccine? No  Do you have any current illness? No  Have you ever had Guillian [place] Syndrome? No  Do you have a serious allergy to any of the follow: Neomycin, Polymyxin, Thimerosal, eggs or egg products? No    Flu vaccine given per order. Please see immunization tab. Risks and benefits explained. Current VIS given.       Immunizations Administered     Name Date Dose Route    Influenza, Quadv, IM, (6 mo and older Fluzone, Flulaval, Fluarix and 3 yrs and older Afluria) 10/13/2021 0.5 mL Intramuscular    Site: Deltoid- Left    Lot: [id number]    [address]: 89297-079-72

## 2021-10-13 NOTE — PROGRESS NOTES
[address] PRIMARY CARE  [address] [facility] [address]  Dept: [phone]  Dept Fax: [phone]     10/13/2021      [address] [place] [address]   1965     Chief Complaint   Patient presents with    New Patient     to provider       HPI    Pt comes in today to establish care and routine physical.     HEALTH MAINTENANCE    PAP: s/p hysterectomy for uterine prolapse. Colonoscopy/Cologuard/FIT: 2009 at [facility]. Normal (no polyps). Mammo: Due    DEXA: N/A. Immunizations: Due for Flu. UTD on tdap and COVID. H/o Graves s/p thyroidectomy. Follows with Dr. Rachana Haley. Follows with ophthalmologist for h/o thyroid eye disease. Recently had CT orbits. PHQ-9 Total Score: 0 (10/13/2021  2:06 PM)       Prior to Visit Medications    Medication Sig Taking? Authorizing Provider   SYNTHROID 137 MCG tablet Take 1 tablet by mouth every morning (before breakfast) Yes Baudiloi Jeronimo MD        Past Medical History:   Diagnosis Date    Graves disease     S/p thyroidectomy, follows with Dr. Baudilio Jeronimo Endo    Thyroid eye disease         Social History     Tobacco Use    Smoking status: Current Every Day Smoker     Packs/day: 0.25     Years: 25.00     Pack years: 6.25     Types: Cigarettes    Smokeless tobacco: Never Used    Tobacco comment: 3 cig a day   Vaping Use    Vaping Use: Former   Substance Use Topics    Alcohol use: Yes     Comment: socially    Drug use: No        Past Surgical History:   Procedure Laterality Date    CHOLECYSTECTOMY      HYSTERECTOMY, VAGINAL      THYROIDECTOMY Bilateral 6/29/2020    TOTAL THYROIDECTOMY performed by Kasandra Richardson DO at [address] [facility]          No Known Allergies     Family History   Adopted: Yes        Patient's past medical history, surgical history, family history, medications, and allergies  were all reviewed and updated as appropriate today.     Review of Systems   Constitutional: Negative for chills, fever and unexpected weight change. Respiratory: Negative for shortness of breath. Cardiovascular: Negative for chest pain. Gastrointestinal: Negative for abdominal pain, diarrhea, nausea and vomiting. /71   Pulse 90   Temp 98.4 °F (36.9 °C)   Ht 5' 6\" (1.676 m)   Wt 199 lb (90.3 kg)   SpO2 98%   BMI 32.12 kg/m²      Physical Exam  Vitals reviewed. Constitutional:       General: She is not in acute distress. Appearance: Normal appearance. She is well-developed. HENT:      Head: Normocephalic. Right Ear: Tympanic membrane normal.      Left Ear: Tympanic membrane normal.   Eyes:      General: No scleral icterus. Conjunctiva/sclera: Conjunctivae normal.   Neck:      Thyroid: No thyromegaly. Cardiovascular:      Rate and Rhythm: Normal rate and regular rhythm. Heart sounds: No murmur heard. Pulmonary:      Effort: Pulmonary effort is normal. No respiratory distress. Breath sounds: Normal breath sounds. Abdominal:      General: Bowel sounds are normal. There is no distension. Palpations: Abdomen is soft. Tenderness: There is no abdominal tenderness. Musculoskeletal:      Cervical back: Neck supple. Right lower leg: No edema. Left lower leg: No edema. Lymphadenopathy:      Cervical: No cervical adenopathy. Skin:     General: Skin is warm and dry. Capillary Refill: Capillary refill takes less than 2 seconds. Neurological:      General: No focal deficit present. Mental Status: She is alert and oriented to person, place, and time. Mental status is at baseline. Psychiatric:         Mood and Affect: Mood normal.         Assessment:  Encounter Diagnoses   Name Primary?     Routine physical examination Yes    Thyroid eye disease     Postoperative hypothyroidism     Flu vaccine need     Encounter for screening mammogram for malignant neoplasm of breast     Screening for colon cancer        Plan:    - Colonoscopy/mammo ordered. - Flu vaccine administered. - She has requested to transition care back to primary care for mgmt of thyroid condition, which I agreed to. - Will follow up with Ophthalmologist re: recent CT scan and ongoing eye issues. - Fasting labs ordered. New Prescriptions    No medications on file        Orders Placed This Encounter   Procedures    JOSE DIGITAL SCREEN W OR WO CAD BILATERAL    INFLUENZA, QUADV, 3 YRS AND OLDER, IM, MDV, 0.5ML (AFLURIA QUADV)    CBC Auto Differential    Comprehensive Metabolic Panel    Lipid, Fasting    Víctor Phillips MD (Colonoscopy), General Surgery, [facility]        Return in about 3 months (around 1/13/2022) for Follow up thyroid condition.         [address], DO

## 2021-10-14 ENCOUNTER — TELEPHONE (OUTPATIENT)
Dept: SURGERY | Age: 56
End: 2021-10-14

## 2021-11-03 ENCOUNTER — TELEPHONE (OUTPATIENT)
Dept: SURGERY | Age: 56
End: 2021-11-03

## 2021-11-03 NOTE — LETTER
[address] [place] [address] Dany Llanes    [phone]        Fax  [phone]    Miles Almanza MD    Instructions for Outpatient Surgery    Patient Name: Ariela Palmer: [facility] of [address] [place], [place]    Date of Surgery: Thursday 11/11/2021  Time to arrive 10:00am at the Main Entrance    PLEASE BE CERTAIN TO FOLLOW THESE INSTRUCTIONS CAREFULLY BEFORE SURGERY    1. _X___ Do NOT eat or drink anything after midnight the evening before surgery. Food or drink intake of any kind will result in the cancellation of surgery. 2. _X___ STOP all Blood Thinning medications AND Anti inflammatories; Aspirin, Coumadin & Plavix ect. 7 days prior to surgery. (Aleve)    3. _X___ Bowel prep the day before surgery. Follow clear liquid diet and Miralax/Dulcolax prep instructions the day prior to your procedure. (See below)    AFTER SURGERY  1. A responsible adult is REQUIRED to accompany you to the hospital and remain there for your surgery. If this arrangement has not been made at the time of your surgery, your surgery may be cancelled  2. You should not be left alone for 24  48 hours following surgery. 3. You should not drive, sign any important documents or make any important decisions until you have fully recovered from anesthesia (approximately 24  48 hours) AND are off all narcotic pain medications. · Any paperwork needing completion for either your employer or insurance company can be faxed, mailed or dropped off at our office to my attention. Please allow 7 business days for the paperwork to be completed. You will be contacted once it has been completed at which time you will be able to pick it up or have it mailed. · NOTE: Due to HIPPA policy, completed paperwork cannot be faxed and per GIANCARLO ORTIZ FLAVIO [facility] of [birth year], Fees for form completion may apply.      If you have any questions, please feel free to call the number above.     Thelma Shukla MD

## 2021-11-03 NOTE — LETTER
[facility] - [facility] [address] [facility], [address]         p [phone]  f [phone]    Boone Velázquez MD                        ENDOSCOPY ORDER   -- Time of order -- 21    2:08 PM      Facility: [facility]. # _________________                              Scheduled by: ____________       Procedure date & time: 2021 at 11:30am                                  Pt arrival: 10:00am                                                                                        Patient name:  Noble Gannon     :  1965 PCP:  Nelly Almanza, [address] [place] [address] phone:    [phone] (home)                                                     PROCEDURE:  Colonoscopy, possible polypectomy         [id number]    DIAGNOSIS: Screening for Colon Cancer Z12.11    Anesthesia: MAC     Time Needed:  45 minutes     Pt Position:  lateral, right side up         Outpatient   X                                  X    PREP: Miralax/Dulcolax      Medications to be stopped 7 days before surgery: Aleve    Additional / Special Orders: Gadiel Garcia Covid Vaccine #2 May 2021                                                                                                                        Boone Velázquez MD    Insurance:                                    ID #                                             Ph #     (secondary ?)       Date called ________     Bary Bare to: ___________ @ _______      Precert Needed?  Yes  /  No    PreAuth # & Details _______________________________________________________                        ______ Hugo Hinson to [facility] Surgery Authorization Team [phone]                                     ____E-mailed                 _____ Spoke to Pt / Spouse

## 2021-11-03 NOTE — TELEPHONE ENCOUNTER
Pt called to schedule a colonoscopy. Pt was referred to this office by Dr. Felicity Castellanos. Please call pt, [phone].

## 2021-11-10 ENCOUNTER — TELEPHONE (OUTPATIENT)
Dept: SURGERY | Age: 56
End: 2021-11-10

## 2021-11-10 ENCOUNTER — ANESTHESIA EVENT (OUTPATIENT)
Dept: ENDOSCOPY | Age: 56
End: 2021-11-10
Payer: COMMERCIAL

## 2021-11-11 ENCOUNTER — HOSPITAL ENCOUNTER (OUTPATIENT)
Age: 56
Setting detail: OUTPATIENT SURGERY
Discharge: HOME | End: 2021-11-11
Attending: SURGERY | Admitting: SURGERY
Payer: COMMERCIAL

## 2021-11-11 ENCOUNTER — ANESTHESIA (OUTPATIENT)
Dept: ENDOSCOPY | Age: 56
End: 2021-11-11
Payer: COMMERCIAL

## 2021-11-11 VITALS
OXYGEN SATURATION: 97 % | HEART RATE: 56 BPM | DIASTOLIC BLOOD PRESSURE: 67 MMHG | RESPIRATION RATE: 16 BRPM | TEMPERATURE: 98.1 F | SYSTOLIC BLOOD PRESSURE: 120 MMHG

## 2021-11-11 VITALS
SYSTOLIC BLOOD PRESSURE: 93 MMHG | OXYGEN SATURATION: 97 % | RESPIRATION RATE: 14 BRPM | DIASTOLIC BLOOD PRESSURE: 51 MMHG

## 2021-11-11 DIAGNOSIS — Z12.11 COLON CANCER SCREENING: ICD-10-CM

## 2021-11-11 PROCEDURE — 3700000000 HC ANESTHESIA ATTENDED CARE: Performed by: SURGERY

## 2021-11-11 PROCEDURE — 2580000003 HC RX 258: Performed by: ANESTHESIOLOGY

## 2021-11-11 PROCEDURE — 88305 TISSUE EXAM BY PATHOLOGIST: CPT

## 2021-11-11 PROCEDURE — 6360000002 HC RX W HCPCS: Performed by: NURSE ANESTHETIST, CERTIFIED REGISTERED

## 2021-11-11 PROCEDURE — 7100000010 HC PHASE II RECOVERY - FIRST 15 MIN: Performed by: SURGERY

## 2021-11-11 PROCEDURE — 2709999900 HC NON-CHARGEABLE SUPPLY: Performed by: SURGERY

## 2021-11-11 PROCEDURE — 3700000001 HC ADD 15 MINUTES (ANESTHESIA): Performed by: SURGERY

## 2021-11-11 PROCEDURE — 45380 COLONOSCOPY AND BIOPSY: CPT | Performed by: SURGERY

## 2021-11-11 PROCEDURE — 7100000011 HC PHASE II RECOVERY - ADDTL 15 MIN: Performed by: SURGERY

## 2021-11-11 PROCEDURE — 3609010600 HC COLONOSCOPY POLYPECTOMY SNARE/COLD BIOPSY: Performed by: SURGERY

## 2021-11-11 PROCEDURE — 2580000003 HC RX 258: Performed by: NURSE ANESTHETIST, CERTIFIED REGISTERED

## 2021-11-11 RX ORDER — SODIUM CHLORIDE, SODIUM LACTATE, POTASSIUM CHLORIDE, CALCIUM CHLORIDE 600; 310; 30; 20 MG/100ML; MG/100ML; MG/100ML; MG/100ML
INJECTION, SOLUTION INTRAVENOUS CONTINUOUS PRN
Status: DISCONTINUED | OUTPATIENT
Start: 2021-11-11 | End: 2021-11-11 | Stop reason: SDUPTHER

## 2021-11-11 RX ORDER — SODIUM CHLORIDE, SODIUM LACTATE, POTASSIUM CHLORIDE, CALCIUM CHLORIDE 600; 310; 30; 20 MG/100ML; MG/100ML; MG/100ML; MG/100ML
INJECTION, SOLUTION INTRAVENOUS CONTINUOUS
Status: DISCONTINUED | OUTPATIENT
Start: 2021-11-11 | End: 2021-11-11 | Stop reason: HOSPADM

## 2021-11-11 RX ORDER — PROPOFOL 10 MG/ML
INJECTION, EMULSION INTRAVENOUS CONTINUOUS PRN
Status: DISCONTINUED | OUTPATIENT
Start: 2021-11-11 | End: 2021-11-11 | Stop reason: SDUPTHER

## 2021-11-11 RX ORDER — PROPOFOL 10 MG/ML
INJECTION, EMULSION INTRAVENOUS PRN
Status: DISCONTINUED | OUTPATIENT
Start: 2021-11-11 | End: 2021-11-11 | Stop reason: SDUPTHER

## 2021-11-11 RX ADMIN — PROPOFOL 20 MG: 10 INJECTION, EMULSION INTRAVENOUS at 11:28

## 2021-11-11 RX ADMIN — PROPOFOL 60 MG: 10 INJECTION, EMULSION INTRAVENOUS at 11:10

## 2021-11-11 RX ADMIN — PROPOFOL 20 MG: 10 INJECTION, EMULSION INTRAVENOUS at 11:30

## 2021-11-11 RX ADMIN — PROPOFOL 20 MG: 10 INJECTION, EMULSION INTRAVENOUS at 11:32

## 2021-11-11 RX ADMIN — PROPOFOL 20 MG: 10 INJECTION, EMULSION INTRAVENOUS at 11:35

## 2021-11-11 RX ADMIN — PROPOFOL 170 MCG/KG/MIN: 10 INJECTION, EMULSION INTRAVENOUS at 11:09

## 2021-11-11 RX ADMIN — SODIUM CHLORIDE, SODIUM LACTATE, POTASSIUM CHLORIDE, AND CALCIUM CHLORIDE: .6; .31; .03; .02 INJECTION, SOLUTION INTRAVENOUS at 10:56

## 2021-11-11 RX ADMIN — SODIUM CHLORIDE, POTASSIUM CHLORIDE, SODIUM LACTATE AND CALCIUM CHLORIDE: 600; 310; 30; 20 INJECTION, SOLUTION INTRAVENOUS at 10:56

## 2021-11-11 RX ADMIN — PROPOFOL 20 MG: 10 INJECTION, EMULSION INTRAVENOUS at 11:29

## 2021-11-11 RX ADMIN — PROPOFOL 30 MG: 10 INJECTION, EMULSION INTRAVENOUS at 11:40

## 2021-11-11 RX ADMIN — PROPOFOL 20 MG: 10 INJECTION, EMULSION INTRAVENOUS at 11:38

## 2021-11-11 RX ADMIN — PROPOFOL 20 MG: 10 INJECTION, EMULSION INTRAVENOUS at 11:11

## 2021-11-11 ASSESSMENT — PULMONARY FUNCTION TESTS
PIF_VALUE: 1
PIF_VALUE: 3
PIF_VALUE: 1
PIF_VALUE: 1
PIF_VALUE: 3
PIF_VALUE: 1

## 2021-11-11 ASSESSMENT — PAIN SCALES - GENERAL: PAINLEVEL_OUTOF10: 0

## 2021-11-11 ASSESSMENT — PAIN - FUNCTIONAL ASSESSMENT
PAIN_FUNCTIONAL_ASSESSMENT: 0-10
PAIN_FUNCTIONAL_ASSESSMENT: 0-10

## 2021-11-11 NOTE — ANESTHESIA PRE PROCEDURE
Department of Anesthesiology  Preprocedure Note       Name:  Gómez Oneil   Age:  61 y.o.  :  1965                                          MRN:  [id number]         Date:  2021      Surgeon: Zeynep Santacruz):  Kendra Layton MD    Procedure: Procedure(s):  COLONOSCOPY, POSSIBLE POLYPECTOMY    Medications prior to admission:   Prior to Admission medications    Medication Sig Start Date End Date Taking? Authorizing Provider   SYNTHROID 137 MCG tablet Take 1 tablet by mouth every morning (before breakfast) 3/17/21   Osmar Prince MD       Current medications:    No current facility-administered medications for this encounter. 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 Osmar Prince Endo    Thyroid eye disease        Past Surgical History:        Procedure Laterality Date    CHOLECYSTECTOMY      HYSTERECTOMY, VAGINAL      THYROIDECTOMY Bilateral 2020    TOTAL THYROIDECTOMY performed by Madeline Valdez DO at [address] [place] [address]         Social History:    Social History     Tobacco Use    Smoking status: Current Every Day Smoker     Packs/day: 0.25     Years: 25.00     Pack years: 6.25     Types: Cigarettes    Smokeless tobacco: Never Used    Tobacco comment: 3 cig a day   Substance Use Topics    Alcohol use: Yes     Comment: socially                                Ready to quit: Not Answered  Counseling given: Not Answered  Comment: 3 cig a day      Vital Signs (Current): There were no vitals filed for this visit.                                            BP Readings from Last 3 Encounters: 10/13/21 118/71   06/16/21 125/80   03/17/21 124/74       NPO Status:                                                                                 BMI:   Wt Readings from Last 3 Encounters:   10/13/21 199 lb (90.3 kg)   06/16/21 192 lb (87.1 kg)   03/17/21 203 lb (92.1 kg)     There is no height or weight on file to calculate BMI.    CBC:   Lab Results   Component Value Date    WBC 8.6 06/30/2020    RBC 4.52 06/30/2020    HGB 12.8 06/30/2020    HCT 39.3 06/30/2020    MCV 87.0 06/30/2020    RDW 13.0 06/30/2020     06/30/2020       CMP:   Lab Results   Component Value Date     03/12/2021    K 4.2 03/12/2021     03/12/2021    CO2 27 03/12/2021    BUN 21 03/12/2021    CREATININE 0.8 03/12/2021    GFRAA >60 03/12/2021    AGRATIO 1.4 03/12/2021    LABGLOM >60 03/12/2021    GLUCOSE 106 03/12/2021    PROT 7.0 03/12/2021    CALCIUM 8.7 03/12/2021    BILITOT 0.3 03/12/2021    ALKPHOS 126 03/12/2021    AST 14 03/12/2021    ALT 11 03/12/2021       POC Tests: No results for input(s): POCGLU, POCNA, POCK, POCCL, POCBUN, POCHEMO, POCHCT in the last 72 hours.     Coags: No results found for: PROTIME, INR, APTT    HCG (If Applicable): No results found for: PREGTESTUR, PREGSERUM, HCG, HCGQUANT     ABGs: No results found for: PHART, PO2ART, WYA1IFF, KLQ5HKB, BEART, Z1WMNGPL     Type & Screen (If Applicable):  No results found for: LABABO, LABRH    Drug/Infectious Status (If Applicable):  No results found for: HIV, HEPCAB    COVID-19 Screening (If Applicable):   Lab Results   Component Value Date    COVID19 Not Detected 06/23/2020           Anesthesia Evaluation  Patient summary reviewed no history of anesthetic complications:   Airway: Mallampati: III  TM distance: >3 FB   Neck ROM: full  Mouth opening: > = 3 FB Dental:          Pulmonary:                             ROS comment: Smokes 6-8 cig per day   Cardiovascular:                      Neuro/Psych:                ROS comment: LBP GI/Hepatic/Renal: Endo/Other:    (+) hypothyroidism::., .                 Abdominal:             Vascular: Other Findings:             Anesthesia Plan      general and MAC     ASA 2       Induction: intravenous. Anesthetic plan and risks discussed with patient.                       Elda Craig MD   11/11/2021

## 2021-11-11 NOTE — H&P
Subjective:     Patient is a 61 y.o. woman here for colonoscopy     HPI: 2nd colonoscopy. Unremarkable colonoscopy 12 years ago. No FH but adopted. Reports some diarrhea after thyroid removal. No bleeding    Patient Active Problem List    Diagnosis Date Noted    Thyroid eye disease 06/16/2021    Postoperative hypothyroidism 08/05/2020    Graves' disease 06/29/2020    Smoking 10/30/2019    Class 1 obesity with body mass index (BMI) of 31.0 to 31.9 in adult 05/21/2019    Acute back pain 01/30/2018     Past Medical History:   Diagnosis Date    Graves disease     S/p thyroidectomy, follows with Dr. Delio Ramos    Thyroid eye disease       Past Surgical History:   Procedure Laterality Date    CHOLECYSTECTOMY      HYSTERECTOMY, VAGINAL      THYROIDECTOMY Bilateral 6/29/2020    TOTAL THYROIDECTOMY performed by Ryann Kaplan DO at [address] [facility]        Medications Prior to Admission: SYNTHROID 137 MCG tablet, Take 1 tablet by mouth every morning (before breakfast)  No Known Allergies   Social History     Tobacco Use    Smoking status: Current Every Day Smoker     Packs/day: 0.25     Years: 25.00     Pack years: 6.25     Types: Cigarettes    Smokeless tobacco: Never Used    Tobacco comment: 3 cig a day   Substance Use Topics    Alcohol use: Yes     Comment: socially      Family History   Adopted: Yes      Review of Systems    GEN: reviewed and negative except as noted in HPI. GI: reviewed and negative except as noted in HPI. Objective:     Patient Vitals for the past 8 hrs:   BP Temp Temp src Pulse Resp SpO2   11/11/21 1030 110/77 98.6 °F (37 °C) Temporal 72 16 98 %     GEN: appears well, no distress, appears stated age  PSYCH: normal mood, normal affect  NECK: no neck masses, trachea midline  ENT: moist oral mucosa; anicteric  SKIN: no rash or jaundice  CV: regular heart rate and rhythm  PULM: normal respiratory effort, no wheezing  GI: soft non tender abdomen.  Normal bowel sounds  RECTAL: deferred   EXT/NEURO: normal gait, strength/sensation grossly intact in all extremities      Assessment:     Colon screening    Plan:     The risks, benefits, and alternatives of colonoscopy were discussed. The risks include bleeding and the very low risk of perforation which might require surgery to fix. May need additional operation/treatment based on findings. Patient understands and wishes to proceed.      Param Thomson M.D.  11/11/21   11:10 AM

## 2021-11-11 NOTE — OP NOTE
Operative Note      Patient: Ramya Lombardo  YOB: 1965  MRN: [id number]    Date of Procedure: 11/11/2021    OPERATIVE NOTE    PREOPERATIVE DIAGNOSIS: Screening Colonoscopy - Colon Cancer Screening  POSTOPERATIVE DIAGNOSIS: Same  PROCEDURE: Colonoscopy with biopsy   ANESTHESIA: MAC  SURGEONS: Rachel Escobar: Regulo Cruz PGY 3 MD   PREP QUALITY: Good  ESTIMATED BLOOD LOSS: 1 cc    OPERATIVE NOTE    After informed consent was obtained the patient was taken to the endoscopy suite. Time out was called to confirm key components. We began by performing a digital rectal exam: No lesions were noted on digital exam.     We then performed colonoscopy complete to the cecum. The exam was not difficult. The prep was good. Cecum and appendiceal orifice were seen and photographed. Withdrawal time was approximately 20 minutes. A single diminutive polyp of the sigmoid was removed by cold forceps. No other pathology was seen. Resection and retrieval for all lesions was complete. Good hemostasis was seen. Retroflexion showed normal rectum and anus aside from small hemorrhoids. Dr. Nedra Bullock was present throughout. The patient tolerated well with no immediate complication.  Follow up is recommended based on pathology    Rachel Arroyo M.D.  11/11/21   11:42 AM

## 2021-11-15 ENCOUNTER — TELEPHONE (OUTPATIENT)
Dept: SURGERY | Age: 56
End: 2021-11-15

## 2022-01-27 ENCOUNTER — HOSPITAL ENCOUNTER (OUTPATIENT)
Dept: MAMMOGRAPHY | Age: 57
Discharge: HOME | End: 2022-02-01
Payer: COMMERCIAL

## 2022-01-27 VITALS — HEIGHT: 67 IN | WEIGHT: 174 LBS | BODY MASS INDEX: 27.31 KG/M2

## 2022-01-27 DIAGNOSIS — Z12.31 VISIT FOR SCREENING MAMMOGRAM: ICD-10-CM

## 2022-01-27 PROCEDURE — 77067 SCR MAMMO BI INCL CAD: CPT

## 2022-01-28 NOTE — RESULT ENCOUNTER NOTE
Your recent test results are all normal. Please don't hesitate to contact the office with any additional questions/concerns - Dr. Mert Paez

## 2022-04-18 RX ORDER — LEVOTHYROXINE SODIUM 137 MCG
TABLET ORAL
Qty: 90 TABLET | Refills: 3 | OUTPATIENT
Start: 2022-04-18

## 2022-08-11 ENCOUNTER — OFFICE VISIT (OUTPATIENT)
Dept: PRIMARY CARE CLINIC | Age: 57
End: 2022-08-11
Payer: COMMERCIAL

## 2022-08-11 VITALS
WEIGHT: 212.4 LBS | BODY MASS INDEX: 33.27 KG/M2 | DIASTOLIC BLOOD PRESSURE: 73 MMHG | HEART RATE: 80 BPM | SYSTOLIC BLOOD PRESSURE: 126 MMHG | TEMPERATURE: 97.4 F | OXYGEN SATURATION: 98 %

## 2022-08-11 DIAGNOSIS — H57.89 THYROID EYE DISEASE: ICD-10-CM

## 2022-08-11 DIAGNOSIS — E89.0 POSTOPERATIVE HYPOTHYROIDISM: Primary | ICD-10-CM

## 2022-08-11 DIAGNOSIS — E07.9 THYROID EYE DISEASE: ICD-10-CM

## 2022-08-11 PROCEDURE — 99213 OFFICE O/P EST LOW 20 MIN: CPT | Performed by: FAMILY MEDICINE

## 2022-08-11 RX ORDER — LEVOTHYROXINE SODIUM 137 MCG
137 TABLET ORAL
Qty: 90 TABLET | Refills: 3 | Status: SHIPPED | OUTPATIENT
Start: 2022-08-11

## 2022-08-11 SDOH — ECONOMIC STABILITY: FOOD INSECURITY: WITHIN THE PAST 12 MONTHS, YOU WORRIED THAT YOUR FOOD WOULD RUN OUT BEFORE YOU GOT MONEY TO BUY MORE.: NEVER TRUE

## 2022-08-11 SDOH — ECONOMIC STABILITY: FOOD INSECURITY: WITHIN THE PAST 12 MONTHS, THE FOOD YOU BOUGHT JUST DIDN'T LAST AND YOU DIDN'T HAVE MONEY TO GET MORE.: NEVER TRUE

## 2022-08-11 ASSESSMENT — PATIENT HEALTH QUESTIONNAIRE - PHQ9
SUM OF ALL RESPONSES TO PHQ QUESTIONS 1-9: 0
2. FEELING DOWN, DEPRESSED OR HOPELESS: 0
1. LITTLE INTEREST OR PLEASURE IN DOING THINGS: 0
SUM OF ALL RESPONSES TO PHQ QUESTIONS 1-9: 0
SUM OF ALL RESPONSES TO PHQ9 QUESTIONS 1 & 2: 0

## 2022-08-11 ASSESSMENT — SOCIAL DETERMINANTS OF HEALTH (SDOH): HOW HARD IS IT FOR YOU TO PAY FOR THE VERY BASICS LIKE FOOD, HOUSING, MEDICAL CARE, AND HEATING?: NOT HARD AT ALL

## 2022-08-11 NOTE — PATIENT INSTRUCTIONS
[facility] Laboratory Locations - No appointment necessary. @ indicates the location is open Saturdays in addition to Monday through Friday. Call your preferred location for test preparation, business hours and other information you need. [employer] accepts [employer]. [facility]    @ [place] [facility] [address] [facility] [address] [place] [address]. [place], [address]   Ph: [phone] [facility] Svcs. [address] [facility] [address]   Ph: [phone]  @ [facility] Svcs. [address] [facility]   Ph: [phone]    [facility] [address]   Ph: [phone] @ [place] [facility]. [address] [facility], [address] [facility]  Ph: [phone] @ [facility] [address] Juan Ortiz [facility] [address]   Ph: [phone]     [facility] Svcs. [place] [address]  Ph: [phone]    [place]   @ [facility] [address] [facility] [address] [place] [address]   Ph: [phone] [facility]. Office Bldg. [address] [facility] [facility], [address]  Ph: [address] [place] [address]   [address] Lab Svcs[address] [facility]   Ph: [phone] [facility]. Lab Svcs.   [address] [facility], [address]   Ph: [phone]

## 2022-08-15 PROBLEM — M54.9 ACUTE BACK PAIN: Status: RESOLVED | Noted: 2018-01-30 | Resolved: 2022-08-15

## 2022-08-15 ASSESSMENT — ENCOUNTER SYMPTOMS: COUGH: 0

## 2022-11-23 ENCOUNTER — TELEMEDICINE (OUTPATIENT)
Dept: PRIMARY CARE CLINIC | Age: 57
End: 2022-11-23
Payer: COMMERCIAL

## 2022-11-23 DIAGNOSIS — J06.9 UPPER RESPIRATORY TRACT INFECTION, UNSPECIFIED TYPE: Primary | ICD-10-CM

## 2022-11-23 PROCEDURE — 99213 OFFICE O/P EST LOW 20 MIN: CPT | Performed by: NURSE PRACTITIONER

## 2022-11-23 RX ORDER — PREDNISONE 20 MG/1
40 TABLET ORAL DAILY
Qty: 10 TABLET | Refills: 0 | Status: SHIPPED | OUTPATIENT
Start: 2022-11-23 | End: 2022-11-28

## 2022-11-23 NOTE — PROGRESS NOTES
[address] PRIMARY CARE  [address] [place] [address]  Dept: [phone]  Dept Fax: [phone]     11/23/2022      Raoul Laws   1965     Chief Complaint   Patient presents with    Cough    Congestion       HPI  Pt encounter today completed virtual visit using doxy. me platform. Services were provided through a video synchronous discussion virtually to substitute for in-person clinic visit. Patient and provider were located at their individual homes/offices. Virtual visit with patient. States she has had sick symptoms for 2 days. Cough, congestion, fever of 101.4F, body aches, fatigue, winded easily. States she has congestion in chest that clears with coughing but shortly returns. She is currently taking Mucinex DM with minimal relief. PHQ Scores 8/11/2022 10/13/2021 4/25/2019   PHQ2 Score 0 0 1   PHQ9 Score 0 0 1     Interpretation of Total Score Depression Severity: 1-4 = Minimal depression, 5-9 = Mild depression, 10-14 = Moderate depression, 15-19 = Moderately severe depression, 20-27 = Severe depression     Prior to Visit Medications    Medication Sig Taking?  Authorizing Provider   SYNTHROID 137 MCG tablet Take 1 tablet by mouth every morning (before breakfast)  Negin Tinsley DO       Past Medical History:   Diagnosis Date    Graves disease     S/p thyroidectomy, follows with Dr. Marlon Pang Endo    Thyroid eye disease         Social History     Tobacco Use    Smoking status: Every Day     Packs/day: 0.25     Years: 25.00     Pack years: 6.25     Types: Cigarettes    Smokeless tobacco: Never    Tobacco comments:     3 cig a day   Vaping Use    Vaping Use: Former   Substance Use Topics    Alcohol use: Yes     Comment: socially    Drug use: No        Past Surgical History:   Procedure Laterality Date    CHOLECYSTECTOMY      COLONOSCOPY  11/11/2021    COLONOSCOPY POLYPECTOMY SNARE/COLD BIOPSY performed by Martinez Carlton MD at [address] ENDOSCOPY    HYSTERECTOMY (CERVIX STATUS UNKNOWN)      HYSTERECTOMY, VAGINAL      THYROIDECTOMY Bilateral 6/29/2020    TOTAL THYROIDECTOMY performed by Barber Harp DO at [address]          No Known Allergies     Family History   Adopted: Yes        Patient's past medical history, surgical history, family history, medications, and allergies  were all reviewed and updated as appropriate today. Review of Systems   Constitutional:  Positive for fatigue and fever. HENT:  Positive for congestion. Negative for sinus pressure and trouble swallowing. Respiratory:  Positive for shortness of breath. Negative for chest tightness and wheezing. Cardiovascular:  Negative for chest pain and palpitations. Gastrointestinal:  Negative for abdominal pain. Genitourinary:  Negative for difficulty urinating. Musculoskeletal:  Positive for myalgias. Psychiatric/Behavioral: Negative. Vitals unable to obtain 2/2 virtual visit nature of this encounter. Physical Exam  Constitutional:       Appearance: Normal appearance. She is ill-appearing (mild). HENT:      Head: Normocephalic and atraumatic. Eyes:      Extraocular Movements: Extraocular movements intact. Pulmonary:      Effort: Pulmonary effort is normal.   Neurological:      General: No focal deficit present. Mental Status: She is alert and oriented to person, place, and time. Psychiatric:         Mood and Affect: Mood normal.         Behavior: Behavior normal.       Assessment:  Encounter Diagnosis   Name Primary? Upper respiratory tract infection, unspecified type Yes       Plan:  1. Upper respiratory tract infection, unspecified type  Acute onset mild illness consistent with viral etiology. Patient does not appear in acute distress on video visit; no red flags on history. Discussed expected course of illness and OTC agents that can be used for symptomatic relief. At this time will give short course of oral steroids. Discussed potential medication side effects and when she should follow up. Given precautions and answered all questions. - predniSONE (DELTASONE) 20 MG tablet; Take 2 tablets by mouth daily for 5 days  Dispense: 10 tablet; Refill: 0    Return if symptoms worsen or fail to improve. MARY Long - MARIKA           Will Peterson is a 61 y.o. female being evaluated by a Virtual Visit (video visit) encounter to address concerns as mentioned above. A caregiver was present when appropriate. Due to this being a TeleHealth encounter (During [facility] public health emergency), evaluation of the following organ systems was limited: Vitals/Constitutional/EENT/Resp/CV/GI//MS/Neuro/Skin/Heme-Lymph-Imm. Pursuant to the emergency declaration under the [address] [place] [address], [address] [place] [address] authority and the [employer] Act, this Virtual Visit was conducted with patient's (and/or legal guardian's) consent, to reduce the patient's risk of exposure to COVID-19 and provide necessary medical care. The patient (and/or legal guardian) has also been advised to contact this office for worsening conditions or problems, and seek emergency medical treatment and/or call 911 if deemed necessary.

## 2022-11-28 ENCOUNTER — TELEPHONE (OUTPATIENT)
Dept: PRIMARY CARE CLINIC | Age: 57
End: 2022-11-28

## 2022-11-28 DIAGNOSIS — J06.9 UPPER RESPIRATORY TRACT INFECTION, UNSPECIFIED TYPE: Primary | ICD-10-CM

## 2022-11-28 RX ORDER — AZITHROMYCIN 250 MG/1
250 TABLET, FILM COATED ORAL SEE ADMIN INSTRUCTIONS
Qty: 6 TABLET | Refills: 0 | Status: SHIPPED | OUTPATIENT
Start: 2022-11-28 | End: 2022-12-03

## 2022-11-28 ASSESSMENT — ENCOUNTER SYMPTOMS
TROUBLE SWALLOWING: 0
SINUS PRESSURE: 0
CHEST TIGHTNESS: 0
SHORTNESS OF BREATH: 1
WHEEZING: 0
ABDOMINAL PAIN: 0

## 2022-11-28 NOTE — TELEPHONE ENCOUNTER
Patient is still experiencing congestion in lungs, cough, lungs crackle when laying down, tires easily.  She says that the steroids that were previously prescribed helped but she is still having these issues and would like to get a refill of the steroids  Please advise

## 2022-11-28 NOTE — TELEPHONE ENCOUNTER
If patient is still not much better, at this time I would recommend antibiotics. I will send something in.

## 2023-02-22 ENCOUNTER — OFFICE VISIT (OUTPATIENT)
Dept: PRIMARY CARE CLINIC | Age: 58
End: 2023-02-22
Payer: COMMERCIAL

## 2023-02-22 VITALS
TEMPERATURE: 98 F | BODY MASS INDEX: 31.95 KG/M2 | WEIGHT: 204 LBS | HEART RATE: 65 BPM | SYSTOLIC BLOOD PRESSURE: 119 MMHG | DIASTOLIC BLOOD PRESSURE: 71 MMHG

## 2023-02-22 DIAGNOSIS — E89.0 POSTOPERATIVE HYPOTHYROIDISM: ICD-10-CM

## 2023-02-22 DIAGNOSIS — M75.01 ADHESIVE CAPSULITIS OF RIGHT SHOULDER: ICD-10-CM

## 2023-02-22 DIAGNOSIS — Z72.0 TOBACCO ABUSE: ICD-10-CM

## 2023-02-22 DIAGNOSIS — E78.2 MIXED HYPERLIPIDEMIA: ICD-10-CM

## 2023-02-22 DIAGNOSIS — R07.89 CHEST HEAVINESS: Primary | ICD-10-CM

## 2023-02-22 LAB — TSH SERPL DL<=0.05 MIU/L-ACNC: 35.63 UIU/ML (ref 0.27–4.2)

## 2023-02-22 PROCEDURE — 99214 OFFICE O/P EST MOD 30 MIN: CPT | Performed by: FAMILY MEDICINE

## 2023-02-22 RX ORDER — METHYLPREDNISOLONE 4 MG/1
TABLET ORAL
Qty: 1 KIT | Refills: 0 | Status: SHIPPED | OUTPATIENT
Start: 2023-02-22

## 2023-02-22 RX ORDER — NAPROXEN 500 MG/1
500 TABLET ORAL 2 TIMES DAILY WITH MEALS
Qty: 60 TABLET | Refills: 0 | Status: SHIPPED | OUTPATIENT
Start: 2023-02-22

## 2023-02-22 SDOH — ECONOMIC STABILITY: INCOME INSECURITY: HOW HARD IS IT FOR YOU TO PAY FOR THE VERY BASICS LIKE FOOD, HOUSING, MEDICAL CARE, AND HEATING?: NOT HARD AT ALL

## 2023-02-22 SDOH — ECONOMIC STABILITY: FOOD INSECURITY: WITHIN THE PAST 12 MONTHS, THE FOOD YOU BOUGHT JUST DIDN'T LAST AND YOU DIDN'T HAVE MONEY TO GET MORE.: NEVER TRUE

## 2023-02-22 SDOH — ECONOMIC STABILITY: TRANSPORTATION INSECURITY
IN THE PAST 12 MONTHS, HAS LACK OF TRANSPORTATION KEPT YOU FROM MEETINGS, WORK, OR FROM GETTING THINGS NEEDED FOR DAILY LIVING?: NO

## 2023-02-22 SDOH — ECONOMIC STABILITY: FOOD INSECURITY: WITHIN THE PAST 12 MONTHS, YOU WORRIED THAT YOUR FOOD WOULD RUN OUT BEFORE YOU GOT MONEY TO BUY MORE.: NEVER TRUE

## 2023-02-22 SDOH — ECONOMIC STABILITY: HOUSING INSECURITY
IN THE LAST 12 MONTHS, WAS THERE A TIME WHEN YOU DID NOT HAVE A STEADY PLACE TO SLEEP OR SLEPT IN A SHELTER (INCLUDING NOW)?: NO

## 2023-02-22 ASSESSMENT — PATIENT HEALTH QUESTIONNAIRE - PHQ9
SUM OF ALL RESPONSES TO PHQ QUESTIONS 1-9: 0
SUM OF ALL RESPONSES TO PHQ QUESTIONS 1-9: 0
SUM OF ALL RESPONSES TO PHQ9 QUESTIONS 1 & 2: 0
2. FEELING DOWN, DEPRESSED OR HOPELESS: 0
SUM OF ALL RESPONSES TO PHQ QUESTIONS 1-9: 0
SUM OF ALL RESPONSES TO PHQ QUESTIONS 1-9: 0
1. LITTLE INTEREST OR PLEASURE IN DOING THINGS: 0

## 2023-02-22 NOTE — PROGRESS NOTES
[address] [facility] [address] PRIMARY CARE  [address] [facility] [address] [facility] [address] [address]  Dept: [phone]  Dept Fax: [phone]     2023      [address] [place] [address]   1965     Chief Complaint   Patient presents with    Arm Pain       HPI    Pt comes in today for c/o right shoulder and upper arm pain. Insidious onset. No trauma or fall. Unable to raise her arm over her head due to the pain. Trouble with lifting. Sleeps with it elevated on a pillow which helps. Worse with more activity/lifting. Had 2 episodes where she felt heaviness across her upper chest and down both arms. Started 1 week ago. Lasts 10 seconds. Was up and doing chores or walking around the house when it happened. No chest pain. Some mild lightheadedness. Once she sat down, it improved. Patient is a smoker, ~1/4 - 1/2 PPD. Last cholesterol panel in 2017 was abnormal. Patient is adopted. Does not know family history. Has a h/o graves disease s/p thyroidectomy. Never went for for the  labs that were ordered. Did go this morning but only TSH was drawn as previous labs were . TSH results are pending. No data recorded       Prior to Visit Medications    Medication Sig Taking?  Authorizing Provider   SYNTHROID 137 MCG tablet Take 1 tablet by mouth every morning (before breakfast)  Reynold Toth DO        Past Medical History:   Diagnosis Date    Graves disease     S/p thyroidectomy, follows with Dr. Haig Alpers Endo    Thyroid eye disease         Social History     Tobacco Use    Smoking status: Every Day     Packs/day: 0.25     Years: 25.00     Pack years: 6.25     Types: Cigarettes    Smokeless tobacco: Never    Tobacco comments:     3 cig a day   Vaping Use    Vaping Use: Former   Substance Use Topics    Alcohol use: Yes     Comment: socially    Drug use: No        Past Surgical History:   Procedure Laterality Date    CHOLECYSTECTOMY      COLONOSCOPY  2021    COLONOSCOPY POLYPECTOMY SNARE/COLD BIOPSY performed by Julio Owens MD at [address] ([address])      HYSTERECTOMY, VAGINAL      THYROIDECTOMY Bilateral 6/29/2020    TOTAL THYROIDECTOMY performed by Norm Maldonado DO at [address]          No Known Allergies     Family History   Adopted: Yes        Patient's past medical history, surgical history, family history, medications, and allergies  were all reviewed and updated as appropriate today. Review of Systems   Constitutional:  Negative for fatigue, fever and unexpected weight change. Respiratory:  Negative for cough and shortness of breath. Cardiovascular:  Positive for chest pain. Negative for palpitations. Musculoskeletal:  Negative for joint swelling and neck pain. /71   Pulse 65   Temp 98 °F (36.7 °C)   Wt 204 lb (92.5 kg)   BMI 31.95 kg/m²      Physical Exam  Constitutional:       General: She is not in acute distress. Appearance: Normal appearance. She is not ill-appearing. Cardiovascular:      Rate and Rhythm: Normal rate. Heart sounds: No murmur heard. Pulmonary:      Effort: Pulmonary effort is normal. No respiratory distress. Musculoskeletal:      Right shoulder: Tenderness present. No swelling, deformity or crepitus. Decreased range of motion (globally reduced, very painful in all directions). Right lower leg: No edema. Left lower leg: No edema. Skin:     General: Skin is warm and dry. Neurological:      General: No focal deficit present. Mental Status: She is alert. Psychiatric:         Mood and Affect: Mood normal.       Assessment:  Encounter Diagnoses   Name Primary? Chest heaviness Yes    Adhesive capsulitis of right shoulder     Mixed hyperlipidemia     Tobacco abuse        Plan:    - Adhesive capsulitis. Home exercises provided. Recommended medrol + naproxen. May need ortho if no improvement. - Overdue for follow up labs.  She will go back today for full panel.   - Multiple risk factors for CAD. Cardiology referral. ER precautions. She is asymptomatic in clinic.           Orders Placed This Encounter   Procedures    CBC with Auto Differential    Comprehensive Metabolic Panel    Lipid, Fasting    Jean [place]land        Return in about 6 months (around 8/22/2023) for Annual physical.       Alec Gavin,

## 2023-02-22 NOTE — PROGRESS NOTES
[facility]     Outpatient Cardiology         Patient Name:  Arslan Smith  Requesting Physician: No admitting provider for patient encounter. Primary Care Physician: Praveena Rutledge DO    Reason for Consultation/Chief Complaint:   Chief Complaint   Patient presents with    New Patient       HPI:     61year old female, with history of Graves disease s/p thyroidectomy, referred by PCP for chest pressure. She has episodes of chest pressure radiating to her shoulders and back. When this happens she has both arms go numb and begin tingling with feelings of heaviness. This has occurred over the past month with 2 episodes occurring this past week. She also experiences lightheadedness which she thinks is related to her anxiety when these episodes occur. She has some shortness of breath with exertional activities. Patient denies palpitations. Tobacco use. Unknown family history of heart conditions, she is adopted. Histories:     Past Medical History:   has a past medical history of Graves disease and Thyroid eye disease. Surgical History:   has a past surgical history that includes Cholecystectomy; Tubal ligation; Thyroidectomy (Bilateral, 6/29/2020); Hysterectomy, vaginal; Colonoscopy (11/11/2021); and Hysterectomy. Social History:   reports that she has been smoking cigarettes. She has a 6.25 pack-year smoking history. She has never used smokeless tobacco. She reports current alcohol use. She reports that she does not use drugs. Family History:  No evidence for sudden cardiac death or premature CAD    Medications:     Home Medications:  Were reviewed and are listed in nursing record. and/or listed below    Prior to Admission medications    Medication Sig Start Date End Date Taking?  Authorizing Provider   aspirin EC 81 MG EC tablet Take 1 tablet by mouth daily 2/23/23  Yes Candido Thomas MD   nitroGLYCERIN (NITROSTAT) 0.4 MG SL tablet Place 1 tablet under the tongue every 5 minutes as needed for Chest pain 2/23/23  Yes Pamela Dawson MD   naproxen (NAPROSYN) 500 MG tablet Take 1 tablet by mouth 2 times daily (with meals) 2/22/23  Yes Araceli Harris DO   methylPREDNISolone (MEDROL DOSEPACK) 4 MG tablet Take by mouth as directed. 2/22/23  Yes Araceli Grande DO   SYNTHROID 137 MCG tablet Take 1 tablet by mouth every morning (before breakfast) 8/11/22  Yes Araceli Valencia DO        Allergy:     Patient has no known allergies. Review of Systems:     Review of Systems   Constitutional:  Negative for chills and fever. Respiratory:          See HPI   Cardiovascular:         See HPI. Gastrointestinal:  Negative for abdominal pain and vomiting. Endocrine: Negative. Genitourinary: Negative. Skin: Negative. Psychiatric/Behavioral: Negative. All other systems reviewed and are negative. Physical Examination:     Vitals:    02/23/23 0959   BP: 136/82   Pulse: 68   Weight: 203 lb 11.2 oz (92.4 kg)   Height: 5' 7\" (1.702 m)       Wt Readings from Last 3 Encounters:   02/23/23 203 lb 11.2 oz (92.4 kg)   02/22/23 204 lb (92.5 kg)   08/11/22 212 lb 6.4 oz (96.3 kg)       Physical Exam  Constitutional:       Appearance: Normal appearance. HENT:      Head: Normocephalic and atraumatic. Nose: Nose normal.   Eyes:      Conjunctiva/sclera: Conjunctivae normal.   Cardiovascular:      Rate and Rhythm: Normal rate and regular rhythm. Heart sounds: Normal heart sounds. Pulmonary:      Effort: Pulmonary effort is normal.      Breath sounds: Normal breath sounds. Abdominal:      Palpations: Abdomen is soft. Musculoskeletal:      Cervical back: Neck supple. Skin:     General: Skin is warm and dry. Neurological:      General: No focal deficit present. Mental Status: She is alert.          Labs:     Lab Results   Component Value Date    WBC 8.6 06/30/2020    HGB 12.8 06/30/2020    HCT 39.3 06/30/2020    MCV 87.0 06/30/2020     06/30/2020     Lab Results   Component Value Date     03/12/2021    K 4.2 03/12/2021     03/12/2021    CO2 27 03/12/2021    BUN 21 (H) 03/12/2021    CREATININE 0.8 03/12/2021    GLUCOSE 106 (H) 03/12/2021    CALCIUM 8.7 03/12/2021    PROT 7.0 03/12/2021    LABALBU 4.1 03/12/2021    BILITOT 0.3 03/12/2021    ALKPHOS 126 03/12/2021    AST 14 (L) 03/12/2021    ALT 11 03/12/2021    LABGLOM >60 03/12/2021    GFRAA >60 03/12/2021    AGRATIO 1.4 03/12/2021    GLOB 2.9 03/12/2021         Lab Results   Component Value Date    CHOL 213 (H) 01/13/2017     Lab Results   Component Value Date    TRIG 166 (H) 01/13/2017     Lab Results   Component Value Date    HDL 46 01/13/2017     Lab Results   Component Value Date    LDLCALC 134 (H) 01/13/2017     Lab Results   Component Value Date    LABVLDL 33 01/13/2017     No results found for: CHOLHDLRATIO    No results found for: INR, PROTIME    The ASCVD Risk score (Padmini DK, et al., 2019) failed to calculate for the following reasons:    Cannot find a previous HDL lab    Cannot find a previous total cholesterol lab      Imaging:       ECG (if available, Personally interpreted):    Last Monitor/Holter (if available):    Last Stress (if available):    Last Cath (if available):    Last TTE/KIN(if available):    Last CMR  (if available):    Last Coronary Artery Calcium Score: Ankle-brachial index:    Carotid ultrasound screening:    Abdominal aortic aneurysm screening:    Assessment / Plan:     1. Chest pain, unspecified type    2. Postoperative hypothyroidism    3. Tobacco use    4.  Graves' disease       Reports chest pain radiating to the back -CTA chest to exclude dissection    Echocardiogram to assess for structural abnormalities  Stress Test , myoview - if CTA normal  Start taking 81 mg aspirin daily  Nitro SQ   Tobacco cessation  Follow up within 1 month    Orders Placed This Encounter   Procedures    CTA CHEST W CONTRAST    Stress test, myoview EKG 12 lead    Echo 2D w doppler w color complete          No follow-ups on file. The note was completed using EMR and Dragon dictation system. Every effort was made to ensure accuracy; however, inadvertent computerized transcription errors may be present. All questions and concerns were addressed to the patient. I would like to thank you for providing me the opportunity to participate in the care of your patient. If you have any questions, please do not hesitate to contact me. Eduard Mcbride MD, [facility] [place]  The [address]  [address] [place] [address]  Main Office Phone: [phone]  Fax: [phone]    I, Jimenez Burnette RN, am scribing for and in the presence of Dr. Eduard Mcbride. 02/23/23 7:05 PM    Jimenez Burnette RN    Physician Attestation:  The scribes documentation has been prepared under my direction and personally reviewed by me in its entirety. I confirm the note above accurately reflects all work, treatment, procedures, and medical decision making performed by me.     Electronically signed by Eduard Mcbride MD on 2/23/2023 at 7:08 PM

## 2023-02-23 ENCOUNTER — OFFICE VISIT (OUTPATIENT)
Dept: CARDIOLOGY CLINIC | Age: 58
End: 2023-02-23

## 2023-02-23 VITALS
BODY MASS INDEX: 31.97 KG/M2 | HEART RATE: 68 BPM | HEIGHT: 67 IN | WEIGHT: 203.7 LBS | SYSTOLIC BLOOD PRESSURE: 136 MMHG | DIASTOLIC BLOOD PRESSURE: 82 MMHG

## 2023-02-23 DIAGNOSIS — Z72.0 TOBACCO USE: ICD-10-CM

## 2023-02-23 DIAGNOSIS — E05.00 GRAVES' DISEASE: ICD-10-CM

## 2023-02-23 DIAGNOSIS — E78.2 MIXED HYPERLIPIDEMIA: ICD-10-CM

## 2023-02-23 DIAGNOSIS — E89.0 POSTOPERATIVE HYPOTHYROIDISM: ICD-10-CM

## 2023-02-23 DIAGNOSIS — R07.9 CHEST PAIN, UNSPECIFIED TYPE: Primary | ICD-10-CM

## 2023-02-23 LAB
A/G RATIO: 1.6 (ref 1.1–2.2)
ALBUMIN SERPL-MCNC: 4.3 G/DL (ref 3.4–5)
ALP BLD-CCNC: 88 U/L (ref 40–129)
ALT SERPL-CCNC: 12 U/L (ref 10–40)
ANION GAP SERPL CALCULATED.3IONS-SCNC: 14 MMOL/L (ref 3–16)
AST SERPL-CCNC: 15 U/L (ref 15–37)
BASOPHILS ABSOLUTE: 0.1 K/UL (ref 0–0.2)
BASOPHILS RELATIVE PERCENT: 1 %
BILIRUB SERPL-MCNC: 0.3 MG/DL (ref 0–1)
BUN BLDV-MCNC: 20 MG/DL (ref 7–20)
CALCIUM SERPL-MCNC: 9 MG/DL (ref 8.3–10.6)
CHLORIDE BLD-SCNC: 110 MMOL/L (ref 99–110)
CHOLESTEROL, FASTING: 300 MG/DL (ref 0–199)
CO2: 22 MMOL/L (ref 21–32)
CREAT SERPL-MCNC: 0.9 MG/DL (ref 0.6–1.1)
EOSINOPHILS ABSOLUTE: 0.3 K/UL (ref 0–0.6)
EOSINOPHILS RELATIVE PERCENT: 5 %
GFR SERPL CREATININE-BSD FRML MDRD: >60 ML/MIN/{1.73_M2}
GLUCOSE BLD-MCNC: 92 MG/DL (ref 70–99)
HCT VFR BLD CALC: 46.1 % (ref 36–48)
HDLC SERPL-MCNC: 70 MG/DL (ref 40–60)
HEMOGLOBIN: 15.7 G/DL (ref 12–16)
LDL CHOLESTEROL CALCULATED: 196 MG/DL
LYMPHOCYTES ABSOLUTE: 1.9 K/UL (ref 1–5.1)
LYMPHOCYTES RELATIVE PERCENT: 30 %
MCH RBC QN AUTO: 32.6 PG (ref 26–34)
MCHC RBC AUTO-ENTMCNC: 34 G/DL (ref 31–36)
MCV RBC AUTO: 95.9 FL (ref 80–100)
MONOCYTES ABSOLUTE: 0.6 K/UL (ref 0–1.3)
MONOCYTES RELATIVE PERCENT: 9 %
NEUTROPHILS ABSOLUTE: 3.5 K/UL (ref 1.7–7.7)
NEUTROPHILS RELATIVE PERCENT: 55 %
PDW BLD-RTO: 14.9 % (ref 12.4–15.4)
PLATELET # BLD: 227 K/UL (ref 135–450)
PMV BLD AUTO: 10.5 FL (ref 5–10.5)
POTASSIUM SERPL-SCNC: 4.5 MMOL/L (ref 3.5–5.1)
RBC # BLD: 4.81 M/UL (ref 4–5.2)
RBC # BLD: NORMAL 10*6/UL
SODIUM BLD-SCNC: 146 MMOL/L (ref 136–145)
TOTAL PROTEIN: 7 G/DL (ref 6.4–8.2)
TRIGLYCERIDE, FASTING: 170 MG/DL (ref 0–150)
VLDLC SERPL CALC-MCNC: 34 MG/DL
WBC # BLD: 6.4 K/UL (ref 4–11)

## 2023-02-23 RX ORDER — ASPIRIN 81 MG/1
81 TABLET ORAL DAILY
Qty: 90 TABLET | Refills: 1 | Status: SHIPPED | OUTPATIENT
Start: 2023-02-23

## 2023-02-23 RX ORDER — NITROGLYCERIN 0.4 MG/1
0.4 TABLET SUBLINGUAL EVERY 5 MIN PRN
Qty: 25 TABLET | Refills: 3 | Status: SHIPPED | OUTPATIENT
Start: 2023-02-23

## 2023-02-23 ASSESSMENT — ENCOUNTER SYMPTOMS
ABDOMINAL PAIN: 0
VOMITING: 0

## 2023-02-23 NOTE — PATIENT INSTRUCTIONS
For chest / back pain that does not go away, go to ER.  CTA Chest  Echocardiogram  Stress Test   Start taking 81 mg aspirin daily  Nitro SQ for chest pain 3x as needed, wait 5 minutes between doses. FYI this can lower your blood pressure and cause dizziness, sudden headache is a possible side effect.   Follow up in 1 month

## 2023-02-28 ENCOUNTER — TELEPHONE (OUTPATIENT)
Dept: PRIMARY CARE CLINIC | Age: 58
End: 2023-02-28

## 2023-02-28 ASSESSMENT — ENCOUNTER SYMPTOMS
SHORTNESS OF BREATH: 0
COUGH: 0

## 2023-03-02 ENCOUNTER — HOSPITAL ENCOUNTER (OUTPATIENT)
Dept: CT IMAGING | Age: 58
Discharge: HOME | End: 2023-03-02
Payer: COMMERCIAL

## 2023-03-02 DIAGNOSIS — R07.9 CHEST PAIN, UNSPECIFIED TYPE: ICD-10-CM

## 2023-03-02 DIAGNOSIS — Z72.0 TOBACCO USE: ICD-10-CM

## 2023-03-02 DIAGNOSIS — E05.00 GRAVES' DISEASE: ICD-10-CM

## 2023-03-02 DIAGNOSIS — E89.0 POSTOPERATIVE HYPOTHYROIDISM: ICD-10-CM

## 2023-03-02 PROCEDURE — 71275 CT ANGIOGRAPHY CHEST: CPT

## 2023-03-02 PROCEDURE — 6360000004 HC RX CONTRAST MEDICATION: Performed by: INTERNAL MEDICINE

## 2023-03-02 RX ADMIN — IOPAMIDOL 75 ML: 755 INJECTION, SOLUTION INTRAVENOUS at 16:06

## 2023-03-03 RX ORDER — ATORVASTATIN CALCIUM 20 MG/1
20 TABLET, FILM COATED ORAL DAILY
Qty: 90 TABLET | Refills: 1 | Status: SHIPPED | OUTPATIENT
Start: 2023-03-03

## 2023-03-03 RX ORDER — LEVOTHYROXINE SODIUM 0.15 MG/1
150 TABLET ORAL DAILY
Qty: 90 TABLET | Refills: 3 | Status: SHIPPED | OUTPATIENT
Start: 2023-03-03 | End: 2023-03-03 | Stop reason: SDUPTHER

## 2023-03-03 RX ORDER — LEVOTHYROXINE SODIUM 0.15 MG/1
150 TABLET ORAL DAILY
Qty: 90 TABLET | Refills: 3 | Status: SHIPPED | OUTPATIENT
Start: 2023-03-03

## 2023-03-15 ENCOUNTER — HOSPITAL ENCOUNTER (OUTPATIENT)
Dept: NON INVASIVE DIAGNOSTICS | Age: 58
Discharge: HOME | End: 2023-03-15
Payer: COMMERCIAL

## 2023-03-15 DIAGNOSIS — E89.0 POSTOPERATIVE HYPOTHYROIDISM: ICD-10-CM

## 2023-03-15 DIAGNOSIS — R07.9 CHEST PAIN, UNSPECIFIED TYPE: ICD-10-CM

## 2023-03-15 DIAGNOSIS — E05.00 GRAVES' DISEASE: ICD-10-CM

## 2023-03-15 DIAGNOSIS — Z72.0 TOBACCO USE: ICD-10-CM

## 2023-03-15 LAB
LV EF: 63 %
LV EF: 67 %
LVEF MODALITY: NORMAL
LVEF MODALITY: NORMAL

## 2023-03-15 PROCEDURE — 3430000000 HC RX DIAGNOSTIC RADIOPHARMACEUTICAL: Performed by: INTERNAL MEDICINE

## 2023-03-15 PROCEDURE — C8929 TTE W OR WO FOL WCON,DOPPLER: HCPCS

## 2023-03-15 PROCEDURE — A9502 TC99M TETROFOSMIN: HCPCS | Performed by: INTERNAL MEDICINE

## 2023-03-15 PROCEDURE — 93017 CV STRESS TEST TRACING ONLY: CPT

## 2023-03-15 PROCEDURE — 78452 HT MUSCLE IMAGE SPECT MULT: CPT

## 2023-03-15 RX ADMIN — TETROFOSMIN 30 MILLICURIE: 1.38 INJECTION, POWDER, LYOPHILIZED, FOR SOLUTION INTRAVENOUS at 13:25

## 2023-03-15 RX ADMIN — TETROFOSMIN 10 MILLICURIE: 1.38 INJECTION, POWDER, LYOPHILIZED, FOR SOLUTION INTRAVENOUS at 12:00

## 2023-03-16 ASSESSMENT — ENCOUNTER SYMPTOMS
VOMITING: 0
ABDOMINAL PAIN: 0

## 2023-03-27 DIAGNOSIS — M75.01 ADHESIVE CAPSULITIS OF RIGHT SHOULDER: ICD-10-CM

## 2023-03-28 ENCOUNTER — OFFICE VISIT (OUTPATIENT)
Dept: CARDIOLOGY CLINIC | Age: 58
End: 2023-03-28
Payer: COMMERCIAL

## 2023-03-28 VITALS
SYSTOLIC BLOOD PRESSURE: 132 MMHG | HEIGHT: 67 IN | OXYGEN SATURATION: 97 % | WEIGHT: 204.8 LBS | HEART RATE: 68 BPM | DIASTOLIC BLOOD PRESSURE: 78 MMHG | BODY MASS INDEX: 32.15 KG/M2

## 2023-03-28 DIAGNOSIS — R07.9 CHEST PAIN, UNSPECIFIED TYPE: Primary | ICD-10-CM

## 2023-03-28 DIAGNOSIS — E89.0 POSTOPERATIVE HYPOTHYROIDISM: ICD-10-CM

## 2023-03-28 DIAGNOSIS — E78.5 HYPERLIPIDEMIA, UNSPECIFIED HYPERLIPIDEMIA TYPE: ICD-10-CM

## 2023-03-28 DIAGNOSIS — Z72.0 TOBACCO USE: ICD-10-CM

## 2023-03-28 PROCEDURE — 99214 OFFICE O/P EST MOD 30 MIN: CPT | Performed by: INTERNAL MEDICINE

## 2023-03-28 RX ORDER — NAPROXEN 500 MG/1
TABLET ORAL
Qty: 60 TABLET | Refills: 0 | Status: SHIPPED | OUTPATIENT
Start: 2023-03-28

## 2023-03-28 ASSESSMENT — ENCOUNTER SYMPTOMS
VOMITING: 0
ABDOMINAL PAIN: 0

## 2023-03-28 NOTE — PROGRESS NOTES
focal deficit present. Mental Status: She is alert. Labs:     Lab Results   Component Value Date    WBC 6.4 02/23/2023    HGB 15.7 02/23/2023    HCT 46.1 02/23/2023    MCV 95.9 02/23/2023     02/23/2023     Lab Results   Component Value Date     (H) 02/23/2023    K 4.5 02/23/2023     02/23/2023    CO2 22 02/23/2023    BUN 20 02/23/2023    CREATININE 0.9 02/23/2023    GLUCOSE 92 02/23/2023    CALCIUM 9.0 02/23/2023    PROT 7.0 02/23/2023    LABALBU 4.3 02/23/2023    BILITOT 0.3 02/23/2023    ALKPHOS 88 02/23/2023    AST 15 02/23/2023    ALT 12 02/23/2023    LABGLOM >60 02/23/2023    GFRAA >60 03/12/2021    AGRATIO 1.6 02/23/2023    GLOB 2.9 03/12/2021         Lab Results   Component Value Date    CHOL 213 (H) 01/13/2017     Lab Results   Component Value Date    TRIG 166 (H) 01/13/2017     Lab Results   Component Value Date    HDL 70 (H) 02/23/2023    HDL 46 01/13/2017     Lab Results   Component Value Date    LDLCALC 196 (H) 02/23/2023    LDLCALC 134 (H) 01/13/2017     Lab Results   Component Value Date    LABVLDL 34 02/23/2023    LABVLDL 33 01/13/2017     No results found for: CHOLHDLRATIO    No results found for: INR, PROTIME    The 10-year ASCVD risk score (Padmini DK, et al., 2019) is: 7.9%    Values used to calculate the score:      Age: 61 years      Sex: Female      Is Non- : No      Diabetic: No      Tobacco smoker: Yes      Systolic Blood Pressure: 771 mmHg      Is BP treated: No      HDL Cholesterol: 70 mg/dL      Total Cholesterol: 300 mg/dL      Imaging:       ECG (if available, Personally interpreted):    Last Monitor/Holter (if available):    Last Stress (if available): 3/15/23   Summary   Poor exercise capacity. No ischemic ECG changes/significant arrhythmias noted. Normal LV systolic function 70%. There is normal isotope uptake at stress and rest. There is no evidence of   myocardial ischemia or scar. Low risk study.     Last Cath (if
Damion Puga MD on 3/29/2023 at 8:21 AM

## 2023-04-21 DIAGNOSIS — M75.01 ADHESIVE CAPSULITIS OF RIGHT SHOULDER: ICD-10-CM

## 2023-04-21 RX ORDER — NAPROXEN 500 MG/1
TABLET ORAL
Qty: 60 TABLET | Refills: 0 | Status: SHIPPED | OUTPATIENT
Start: 2023-04-21

## 2023-04-21 NOTE — TELEPHONE ENCOUNTER
Medication:   Requested Prescriptions     Pending Prescriptions Disp Refills    naproxen (NAPROSYN) 500 MG tablet [Pharmacy Med Name: NAPROXEN 500MG TABLETS] 60 tablet 0     Sig: TAKE 1 TABLET BY MOUTH TWICE DAILY WITH MEALS     Last Filled:  3/28/23      Last appt: 2/22/2023   Next appt: Visit date not found

## 2023-04-26 ENCOUNTER — TELEPHONE (OUTPATIENT)
Dept: CARDIOLOGY CLINIC | Age: 58
End: 2023-04-26

## 2023-04-26 ASSESSMENT — ENCOUNTER SYMPTOMS
VOMITING: 0
ABDOMINAL PAIN: 0

## 2023-04-28 DIAGNOSIS — E78.5 HYPERLIPIDEMIA, UNSPECIFIED HYPERLIPIDEMIA TYPE: ICD-10-CM

## 2023-04-28 DIAGNOSIS — R07.9 CHEST PAIN, UNSPECIFIED TYPE: ICD-10-CM

## 2023-04-28 DIAGNOSIS — Z72.0 TOBACCO USE: ICD-10-CM

## 2023-04-28 DIAGNOSIS — E89.0 POSTOPERATIVE HYPOTHYROIDISM: ICD-10-CM

## 2023-04-28 LAB
ALBUMIN SERPL-MCNC: 4.3 G/DL (ref 3.4–5)
ALP SERPL-CCNC: 80 U/L (ref 40–129)
ALT SERPL-CCNC: 13 U/L (ref 10–40)
AST SERPL-CCNC: 14 U/L (ref 15–37)
BILIRUB DIRECT SERPL-MCNC: <0.2 MG/DL (ref 0–0.3)
BILIRUB INDIRECT SERPL-MCNC: ABNORMAL MG/DL (ref 0–1)
BILIRUB SERPL-MCNC: 0.4 MG/DL (ref 0–1)
CHOLEST SERPL-MCNC: 158 MG/DL (ref 0–199)
HDLC SERPL-MCNC: 70 MG/DL (ref 40–60)
LDLC SERPL CALC-MCNC: 74 MG/DL
PROT SERPL-MCNC: 6.6 G/DL (ref 6.4–8.2)
TRIGL SERPL-MCNC: 71 MG/DL (ref 0–150)
VLDLC SERPL CALC-MCNC: 14 MG/DL

## 2023-05-01 NOTE — PROGRESS NOTES
110 02/23/2023    CO2 22 02/23/2023    BUN 20 02/23/2023    CREATININE 0.9 02/23/2023    GLUCOSE 92 02/23/2023    CALCIUM 9.0 02/23/2023    PROT 6.6 04/28/2023    LABALBU 4.3 04/28/2023    BILITOT 0.4 04/28/2023    ALKPHOS 80 04/28/2023    AST 14 (L) 04/28/2023    ALT 13 04/28/2023    LABGLOM >60 02/23/2023    GFRAA >60 03/12/2021    AGRATIO 1.6 02/23/2023    GLOB 2.9 03/12/2021         Lab Results   Component Value Date    CHOL 158 04/28/2023    CHOL 213 (H) 01/13/2017     Lab Results   Component Value Date    TRIG 71 04/28/2023    TRIG 166 (H) 01/13/2017     Lab Results   Component Value Date    HDL 70 (H) 04/28/2023    HDL 70 (H) 02/23/2023    HDL 46 01/13/2017     Lab Results   Component Value Date    LDLCALC 74 04/28/2023    LDLCALC 196 (H) 02/23/2023    LDLCALC 134 (H) 01/13/2017     Lab Results   Component Value Date    LABVLDL 14 04/28/2023    LABVLDL 34 02/23/2023    LABVLDL 33 01/13/2017     No results found for: CHOLHDLRATIO    No results found for: INR, PROTIME    The 10-year ASCVD risk score (Padmini DK, et al., 2019) is: 4.8%    Values used to calculate the score:      Age: 61 years      Sex: Female      Is Non- : No      Diabetic: No      Tobacco smoker: Yes      Systolic Blood Pressure: 591 mmHg      Is BP treated: No      HDL Cholesterol: 70 mg/dL      Total Cholesterol: 158 mg/dL      Imaging:       ECG (if available, Personally interpreted):    Last Monitor/Holter (if available):    Last Stress: 3/15/23   Summary   Poor exercise capacity. No ischemic ECG changes/significant arrhythmias noted. Normal LV systolic function 87%. There is normal isotope uptake at stress and rest. There is no evidence of   myocardial ischemia or scar. Low risk study. Last Cath (if available):    Last TTE/KIN: 3/15/23   Summary   Normal left ventricle size, wall thickness, and systolic function with an   estimated ejection fraction of 60%-65%.    No regional wall motion abnormalities are

## 2023-05-02 ENCOUNTER — OFFICE VISIT (OUTPATIENT)
Dept: CARDIOLOGY CLINIC | Age: 58
End: 2023-05-02
Payer: COMMERCIAL

## 2023-05-02 VITALS
HEART RATE: 72 BPM | WEIGHT: 203 LBS | DIASTOLIC BLOOD PRESSURE: 70 MMHG | BODY MASS INDEX: 31.79 KG/M2 | SYSTOLIC BLOOD PRESSURE: 140 MMHG

## 2023-05-02 DIAGNOSIS — I10 HYPERTENSION, UNSPECIFIED TYPE: ICD-10-CM

## 2023-05-02 DIAGNOSIS — R07.2 PRECORDIAL PAIN: ICD-10-CM

## 2023-05-02 DIAGNOSIS — E78.5 HYPERLIPIDEMIA, UNSPECIFIED HYPERLIPIDEMIA TYPE: Primary | ICD-10-CM

## 2023-05-02 PROCEDURE — 3078F DIAST BP <80 MM HG: CPT | Performed by: INTERNAL MEDICINE

## 2023-05-02 PROCEDURE — 99214 OFFICE O/P EST MOD 30 MIN: CPT | Performed by: INTERNAL MEDICINE

## 2023-05-02 PROCEDURE — 3074F SYST BP LT 130 MM HG: CPT | Performed by: INTERNAL MEDICINE

## 2023-05-02 RX ORDER — ATORVASTATIN CALCIUM 20 MG/1
20 TABLET, FILM COATED ORAL DAILY
Qty: 90 TABLET | Refills: 3 | Status: SHIPPED | OUTPATIENT
Start: 2023-05-02

## 2023-05-02 NOTE — PATIENT INSTRUCTIONS
No further chest discomfort, preserved LV function  Continue antiplatelet therapy with aspirin  Blood pressure log  Avoid NSAIDs - these will raise blood pressure  Follow up with me in 6 months.

## 2023-05-17 DIAGNOSIS — M75.01 ADHESIVE CAPSULITIS OF RIGHT SHOULDER: ICD-10-CM

## 2023-05-17 RX ORDER — NAPROXEN 500 MG/1
TABLET ORAL
Qty: 60 TABLET | Refills: 1 | Status: SHIPPED | OUTPATIENT
Start: 2023-05-17

## 2023-09-05 DIAGNOSIS — M75.01 ADHESIVE CAPSULITIS OF RIGHT SHOULDER: ICD-10-CM

## 2023-09-05 RX ORDER — NAPROXEN 500 MG/1
500 TABLET ORAL 2 TIMES DAILY WITH MEALS
Qty: 60 TABLET | Refills: 1 | Status: SHIPPED | OUTPATIENT
Start: 2023-09-05

## 2023-09-05 NOTE — TELEPHONE ENCOUNTER
Medication:   Requested Prescriptions     Pending Prescriptions Disp Refills    naproxen (NAPROSYN) 500 MG tablet 60 tablet 1     Sig: Take 1 tablet by mouth with breakfast and with evening meal     Last Filled:  n/a    Last appt: 2/22/2023   Next appt: Visit date not found

## 2023-09-05 NOTE — TELEPHONE ENCOUNTER
Requested Prescriptions     Pending Prescriptions Disp Refills    ASPIRIN LOW DOSE 81 MG EC tablet [Pharmacy Med Name: ASPIRIN 81MG EC LOW DOSE TABLETS] 90 tablet 1     Sig: TAKE 1 TABLET BY MOUTH DAILY            Last Office Visit: 5/2/2023     Next Office Visit: 11/14/2023

## 2023-09-06 RX ORDER — ASPIRIN 81 MG/1
TABLET, COATED ORAL
Qty: 90 TABLET | Refills: 1 | Status: SHIPPED | OUTPATIENT
Start: 2023-09-06

## 2023-10-04 RX ORDER — ATORVASTATIN CALCIUM 20 MG/1
20 TABLET, FILM COATED ORAL DAILY
Qty: 90 TABLET | Refills: 0 | Status: SHIPPED | OUTPATIENT
Start: 2023-10-04 | End: 2023-10-13 | Stop reason: SDUPTHER

## 2023-10-04 NOTE — TELEPHONE ENCOUNTER
LAST SEEN       NEXT APPOINTMENT       LAST FILL     2.22.23  NONE   9.4.23  Sent message through [facility] for patient to schedule a physical.

## 2023-10-13 ENCOUNTER — OFFICE VISIT (OUTPATIENT)
Dept: PRIMARY CARE CLINIC | Age: 58
End: 2023-10-13
Payer: COMMERCIAL

## 2023-10-13 VITALS
TEMPERATURE: 97.7 F | OXYGEN SATURATION: 98 % | BODY MASS INDEX: 32.42 KG/M2 | DIASTOLIC BLOOD PRESSURE: 75 MMHG | SYSTOLIC BLOOD PRESSURE: 116 MMHG | WEIGHT: 207 LBS | HEART RATE: 73 BPM

## 2023-10-13 DIAGNOSIS — Z23 FLU VACCINE NEED: ICD-10-CM

## 2023-10-13 DIAGNOSIS — M54.42 ACUTE LEFT-SIDED LOW BACK PAIN WITH LEFT-SIDED SCIATICA: ICD-10-CM

## 2023-10-13 DIAGNOSIS — Z00.00 ANNUAL PHYSICAL EXAM: Primary | ICD-10-CM

## 2023-10-13 DIAGNOSIS — Z12.31 ENCOUNTER FOR SCREENING MAMMOGRAM FOR MALIGNANT NEOPLASM OF BREAST: ICD-10-CM

## 2023-10-13 PROBLEM — R07.2 PRECORDIAL PAIN: Status: RESOLVED | Noted: 2023-05-02 | Resolved: 2023-10-13

## 2023-10-13 PROCEDURE — 90471 IMMUNIZATION ADMIN: CPT | Performed by: FAMILY MEDICINE

## 2023-10-13 PROCEDURE — 90674 CCIIV4 VAC NO PRSV 0.5 ML IM: CPT | Performed by: FAMILY MEDICINE

## 2023-10-13 PROCEDURE — 99396 PREV VISIT EST AGE 40-64: CPT | Performed by: FAMILY MEDICINE

## 2023-10-13 RX ORDER — ATORVASTATIN CALCIUM 20 MG/1
20 TABLET, FILM COATED ORAL DAILY
Qty: 90 TABLET | Refills: 3 | Status: SHIPPED | OUTPATIENT
Start: 2023-10-13

## 2023-10-17 ASSESSMENT — ENCOUNTER SYMPTOMS
NAUSEA: 0
BACK PAIN: 1
ABDOMINAL PAIN: 0
SHORTNESS OF BREATH: 0
DIARRHEA: 0
VOMITING: 0

## 2023-11-13 NOTE — PROGRESS NOTES
03/12/2021    AGRATIO 1.6 02/23/2023    GLOB 2.9 03/12/2021         Lab Results   Component Value Date    CHOL 158 04/28/2023    CHOL 213 (H) 01/13/2017     Lab Results   Component Value Date    TRIG 71 04/28/2023    TRIG 166 (H) 01/13/2017     Lab Results   Component Value Date    HDL 70 (H) 04/28/2023    HDL 70 (H) 02/23/2023    HDL 46 01/13/2017     Lab Results   Component Value Date    LDLCALC 74 04/28/2023    LDLCALC 196 (H) 02/23/2023    LDLCALC 134 (H) 01/13/2017     Lab Results   Component Value Date    LABVLDL 14 04/28/2023    LABVLDL 34 02/23/2023    LABVLDL 33 01/13/2017     No results found for: \"CHOLHDLRATIO\"    No results found for: \"INR\", \"PROTIME\"    The 10-year ASCVD risk score (Padmini BARRY, et al., 2019) is: 3.7%    Values used to calculate the score:      Age: 60 years      Sex: Female      Is Non- : No      Diabetic: No      Tobacco smoker: Yes      Systolic Blood Pressure: 523 mmHg      Is BP treated: No      HDL Cholesterol: 70 mg/dL      Total Cholesterol: 158 mg/dL      Imaging:       ECG (if available, Personally interpreted):    Last Monitor/Holter (if available):    Last Stress: 3/15/23   Summary   Poor exercise capacity. No ischemic ECG changes/significant arrhythmias noted. Normal LV systolic function 42%. There is normal isotope uptake at stress and rest. There is no evidence of   myocardial ischemia or scar. Low risk study. Last Cath (if available):    Last TTE/KIN: 3/15/23   Summary   Normal left ventricle size, wall thickness, and systolic function with an   estimated ejection fraction of 60%-65%. No regional wall motion abnormalities are seen. Normal diastolic function. Last CMR  (if available):    Last Coronary Artery Calcium Score: Ankle-brachial index:    Carotid ultrasound screening:    Abdominal aortic aneurysm screening:    CTA Chest: 3/2/23  Impression   1.  Status post thyroidectomy with no evidence of residual tissue or

## 2023-11-14 ENCOUNTER — OFFICE VISIT (OUTPATIENT)
Dept: CARDIOLOGY CLINIC | Age: 58
End: 2023-11-14
Payer: COMMERCIAL

## 2023-11-14 VITALS
HEART RATE: 72 BPM | OXYGEN SATURATION: 97 % | WEIGHT: 206.6 LBS | DIASTOLIC BLOOD PRESSURE: 74 MMHG | BODY MASS INDEX: 32.36 KG/M2 | SYSTOLIC BLOOD PRESSURE: 116 MMHG

## 2023-11-14 DIAGNOSIS — Z72.0 TOBACCO USE: ICD-10-CM

## 2023-11-14 DIAGNOSIS — E78.5 HYPERLIPIDEMIA, UNSPECIFIED HYPERLIPIDEMIA TYPE: Primary | ICD-10-CM

## 2023-11-14 DIAGNOSIS — E05.00 GRAVES' DISEASE: ICD-10-CM

## 2023-11-14 PROCEDURE — 99214 OFFICE O/P EST MOD 30 MIN: CPT | Performed by: INTERNAL MEDICINE

## 2023-12-01 ENCOUNTER — NURSE ONLY (OUTPATIENT)
Dept: PRIMARY CARE CLINIC | Age: 58
End: 2023-12-01
Payer: COMMERCIAL

## 2023-12-01 ENCOUNTER — TELEMEDICINE (OUTPATIENT)
Dept: PRIMARY CARE CLINIC | Age: 58
End: 2023-12-01
Payer: COMMERCIAL

## 2023-12-01 ENCOUNTER — TELEPHONE (OUTPATIENT)
Dept: PRIMARY CARE CLINIC | Age: 58
End: 2023-12-01

## 2023-12-01 DIAGNOSIS — J06.9 URI WITH COUGH AND CONGESTION: Primary | ICD-10-CM

## 2023-12-01 LAB
INFLUENZA A ANTIGEN, POC: NEGATIVE
INFLUENZA B ANTIGEN, POC: NORMAL
Lab: NORMAL
QC PASS/FAIL: NORMAL
SARS-COV-2 RDRP RESP QL NAA+PROBE: NEGATIVE

## 2023-12-01 PROCEDURE — 87804 INFLUENZA ASSAY W/OPTIC: CPT | Performed by: FAMILY MEDICINE

## 2023-12-01 PROCEDURE — 99213 OFFICE O/P EST LOW 20 MIN: CPT | Performed by: NURSE PRACTITIONER

## 2023-12-01 PROCEDURE — 87635 SARS-COV-2 COVID-19 AMP PRB: CPT | Performed by: FAMILY MEDICINE

## 2023-12-01 RX ORDER — TEPROTUMUMAB 500 MG/1
1845 INJECTION, POWDER, LYOPHILIZED, FOR SOLUTION INTRAVENOUS
COMMUNITY
Start: 2023-11-15

## 2023-12-01 RX ORDER — GUAIFENESIN 600 MG/1
1200 TABLET, EXTENDED RELEASE ORAL 2 TIMES DAILY
Qty: 40 TABLET | Refills: 0 | Status: SHIPPED | OUTPATIENT
Start: 2023-12-01 | End: 2023-12-11

## 2023-12-01 RX ORDER — SOD CHLOR,BICARB/SQUEEZ BOTTLE
1 PACKET, WITH RINSE DEVICE NASAL 2 TIMES DAILY PRN
Qty: 1 EACH | Refills: 0 | Status: SHIPPED | OUTPATIENT
Start: 2023-12-01 | End: 2023-12-31

## 2023-12-01 RX ORDER — BENZONATATE 200 MG/1
200 CAPSULE ORAL 3 TIMES DAILY PRN
Qty: 30 CAPSULE | Refills: 0 | Status: SHIPPED | OUTPATIENT
Start: 2023-12-01 | End: 2023-12-11

## 2023-12-01 ASSESSMENT — ENCOUNTER SYMPTOMS
COUGH: 1
SORE THROAT: 1
WHEEZING: 0
RHINORRHEA: 1
VOMITING: 0
SHORTNESS OF BREATH: 0
SINUS PAIN: 1
VOICE CHANGE: 1
SWOLLEN GLANDS: 0
NAUSEA: 0

## 2023-12-01 NOTE — TELEPHONE ENCOUNTER
Please assist patient with obtaining flu and covid testing. If you need to cancel and reorder the testing I have ordered, please be sure to include me on the results. Thank you!

## 2023-12-01 NOTE — PROGRESS NOTES
Abnormal -          Skin:        [x] No significant exanthematous lesions or discoloration noted on facial skin         [] Abnormal -            Psychiatric:       [x] Normal Affect [] Abnormal -        [] No Hallucinations    Other pertinent observable physical exam findings:- hoarse voice, coughing        On this date 12/1/2023 I have spent 22 minutes reviewing previous notes, test results and face to face (virtual) with the patient discussing the diagnosis and importance of compliance with the treatment plan as well as documenting on the day of the visit. Miguel Low, was evaluated through a synchronous (real-time) audio-video encounter. The patient (or guardian if applicable) is aware that this is a billable service, which includes applicable co-pays. This Virtual Visit was conducted with patient's (and/or legal guardian's) consent. Patient identification was verified, and a caregiver was present when appropriate. The patient was located at Home: [address] [place] [address]  Provider was located at Home ([facility]): [place]  {STOP! Confirm you are appropriately licensed, registered, or certified to deliver care in the state where the patient is located as indicated above. If you are not or unsure, please re-schedule the visit.  yes       --Jenn Torres, MARY - CNP

## 2023-12-06 ENCOUNTER — OFFICE VISIT (OUTPATIENT)
Dept: PRIMARY CARE CLINIC | Age: 58
End: 2023-12-06
Payer: COMMERCIAL

## 2023-12-06 VITALS
TEMPERATURE: 98.7 F | BODY MASS INDEX: 32.3 KG/M2 | WEIGHT: 206.2 LBS | HEART RATE: 75 BPM | OXYGEN SATURATION: 96 % | SYSTOLIC BLOOD PRESSURE: 136 MMHG | DIASTOLIC BLOOD PRESSURE: 82 MMHG

## 2023-12-06 DIAGNOSIS — J20.9 ACUTE BRONCHITIS, UNSPECIFIED ORGANISM: Primary | ICD-10-CM

## 2023-12-06 DIAGNOSIS — Z72.0 TOBACCO ABUSE: ICD-10-CM

## 2023-12-06 PROCEDURE — 99213 OFFICE O/P EST LOW 20 MIN: CPT | Performed by: FAMILY MEDICINE

## 2023-12-06 RX ORDER — DOXYCYCLINE HYCLATE 100 MG
100 TABLET ORAL 2 TIMES DAILY
Qty: 14 TABLET | Refills: 0 | Status: SHIPPED | OUTPATIENT
Start: 2023-12-06 | End: 2023-12-13

## 2023-12-06 RX ORDER — PREDNISONE 20 MG/1
20 TABLET ORAL DAILY
Qty: 5 TABLET | Refills: 0 | Status: SHIPPED | OUTPATIENT
Start: 2023-12-06 | End: 2023-12-11

## 2023-12-06 NOTE — PROGRESS NOTES
[facility]. PRIMARY CARE  [address] [place] [address],Building [facility] [address]  Dept: [phone]  Dept Fax: [phone]     12/6/2023      Audrey Laws   1965     Chief Complaint   Patient presents with    Follow-up    Cough       HPI    Pt comes in today for ~ 1 week of cough/congestion/URI symptoms. + associated fatigue. Saw virtualist on 12/1/23. COVID/flu neg. Rx mucinex, tessalon, nasal wash. Temporary improvement. Has felt the congestion in her chest loosening up. Cough is productive. Thick greenish/yellow sputum. Improved from last Friday but cough is still lingering. Patient is a smoker. Ribs are very sore from the coughing. No fever. No data recorded     Prior to Visit Medications    Medication Sig Taking?  Authorizing Provider   Teprotumumab-trbw (TEPEZZA) 500 MG SOLR injection Infuse 1,845 mg intravenously  Provider, MD Ted   guaiFENesin (MUCINEX) 600 MG extended release tablet Take 2 tablets by mouth 2 times daily for 10 days  MARY Basurto CNP   benzonatate (TESSALON) 200 MG capsule Take 1 capsule by mouth 3 times daily as needed for Cough  MARY Swan CNP   Hypertonic Nasal Wash (SINUS RINSE BOTTLE KIT) PACK 1 packet by Nasal route 2 times daily as needed (congestion)  MARY Swan CNP   atorvastatin (LIPITOR) 20 MG tablet Take 1 tablet by mouth daily  Araceli Harris DO   ASPIRIN LOW DOSE 81 MG EC tablet TAKE 1 TABLET BY MOUTH DAILY  Ophelia Shankar MD   naproxen (NAPROSYN) 500 MG tablet Take 1 tablet by mouth with breakfast and with evening meal  Netta Harris DO   levothyroxine (SYNTHROID) 150 MCG tablet Take 1 tablet by mouth Daily  Netta Harris DO        Past Medical History:   Diagnosis Date    Graves disease     S/p thyroidectomy, follows with Dr. Maria A Paiz Endo    Hyperlipidemia     Thyroid eye disease         Social History     Tobacco Use    Smoking status: Every Day

## 2023-12-10 ASSESSMENT — ENCOUNTER SYMPTOMS: COUGH: 1

## 2024-01-02 DIAGNOSIS — M75.01 ADHESIVE CAPSULITIS OF RIGHT SHOULDER: ICD-10-CM

## 2024-01-03 RX ORDER — NAPROXEN 500 MG/1
TABLET ORAL
Qty: 60 TABLET | Refills: 1 | Status: SHIPPED | OUTPATIENT
Start: 2024-01-03

## 2024-01-03 NOTE — TELEPHONE ENCOUNTER
Medication:   Requested Prescriptions     Pending Prescriptions Disp Refills    naproxen (NAPROSYN) 500 MG tablet [Pharmacy Med Name: NAPROXEN 500MG TABLETS] 60 tablet 1     Sig: TAKE 1 TABLET BY MOUTH TWICE DAILY WITH BREAKFAST AND EVENING MEAL        Last Filled: 9/5/23     Last appt: 12/6/2023   Next appt: Visit date not found  Return if symptoms worsen or fail to improve.  Last OARRS:        No data to display

## 2024-03-02 DIAGNOSIS — M75.01 ADHESIVE CAPSULITIS OF RIGHT SHOULDER: ICD-10-CM

## 2024-03-04 RX ORDER — NAPROXEN 500 MG/1
TABLET ORAL
Qty: 60 TABLET | Refills: 1 | Status: SHIPPED | OUTPATIENT
Start: 2024-03-04

## 2024-03-04 RX ORDER — LEVOTHYROXINE SODIUM 0.15 MG/1
150 TABLET ORAL DAILY
Qty: 90 TABLET | Refills: 3 | Status: SHIPPED | OUTPATIENT
Start: 2024-03-04

## 2024-03-04 NOTE — TELEPHONE ENCOUNTER
Medication:   Requested Prescriptions     Pending Prescriptions Disp Refills    naproxen (NAPROSYN) 500 MG tablet [Pharmacy Med Name: NAPROXEN 500MG TABLETS] 60 tablet 1     Sig: TAKE 1 TABLET BY MOUTH TWICE DAILY WITH BREAKFAST AND EVENING MEAL     Last Filled:  2/1/24    Last appt: 12/6/2023   Next appt: Visit date not found

## 2024-03-05 RX ORDER — ASPIRIN 81 MG/1
TABLET, COATED ORAL
Qty: 90 TABLET | Refills: 1 | Status: SHIPPED | OUTPATIENT
Start: 2024-03-05

## 2024-03-07 ENCOUNTER — TELEPHONE (OUTPATIENT)
Dept: PRIMARY CARE CLINIC | Age: 59
End: 2024-03-07

## 2024-03-07 NOTE — TELEPHONE ENCOUNTER
[facility] would like to know if they can get rx for generic for levothyroxine so patient insurance can cover the rx

## 2024-05-01 DIAGNOSIS — M75.01 ADHESIVE CAPSULITIS OF RIGHT SHOULDER: ICD-10-CM

## 2024-05-01 RX ORDER — NAPROXEN 500 MG/1
TABLET ORAL
Qty: 60 TABLET | Refills: 1 | Status: SHIPPED | OUTPATIENT
Start: 2024-05-01

## 2024-05-01 NOTE — TELEPHONE ENCOUNTER
Medication:   Requested Prescriptions     Pending Prescriptions Disp Refills    naproxen (NAPROSYN) 500 MG tablet [Pharmacy Med Name: NAPROXEN 500MG TABLETS] 60 tablet 1     Sig: TAKE 1 TABLET BY MOUTH TWICE DAILY WITH BREAKFAST AND EVENING MEAL     Last Filled:  4/1/24    Last appt: 12/6/2023   Next appt: Visit date not found

## 2024-05-24 NOTE — PROGRESS NOTES
Patient admitted to PACU #9 from OR per bed at 1020 s/p TOTAL THYROIDECTOMY (Bilateral). Report received at bedside in PACU per CRNA. Patient was reported to be hemodynamically stable in OR with no complications reported. Patient connected to PACU monitoring equipment. IVF's infusing with site unremarkable. Patient arrived to PACU not responsive with oral airway in place with nasal canula placed inside oral airway with no respiratory difficulties noted. Anterior neck surgical incision remains ZOEY with surgical glue with site unremarkable. Ice pack applied. No further changes noted. Will continue to monitor. holding area

## 2024-06-19 ENCOUNTER — OFFICE VISIT (OUTPATIENT)
Dept: PRIMARY CARE CLINIC | Age: 59
End: 2024-06-19
Payer: COMMERCIAL

## 2024-06-19 VITALS
SYSTOLIC BLOOD PRESSURE: 126 MMHG | WEIGHT: 210.6 LBS | HEART RATE: 66 BPM | DIASTOLIC BLOOD PRESSURE: 72 MMHG | OXYGEN SATURATION: 98 % | BODY MASS INDEX: 32.98 KG/M2

## 2024-06-19 DIAGNOSIS — E89.0 POSTOPERATIVE HYPOTHYROIDISM: ICD-10-CM

## 2024-06-19 DIAGNOSIS — E78.5 HYPERLIPIDEMIA, UNSPECIFIED HYPERLIPIDEMIA TYPE: ICD-10-CM

## 2024-06-19 DIAGNOSIS — E05.00 GRAVES' DISEASE: Primary | ICD-10-CM

## 2024-06-19 PROCEDURE — 99213 OFFICE O/P EST LOW 20 MIN: CPT | Performed by: FAMILY MEDICINE

## 2024-06-19 SDOH — ECONOMIC STABILITY: FOOD INSECURITY: WITHIN THE PAST 12 MONTHS, THE FOOD YOU BOUGHT JUST DIDN'T LAST AND YOU DIDN'T HAVE MONEY TO GET MORE.: NEVER TRUE

## 2024-06-19 SDOH — ECONOMIC STABILITY: INCOME INSECURITY: HOW HARD IS IT FOR YOU TO PAY FOR THE VERY BASICS LIKE FOOD, HOUSING, MEDICAL CARE, AND HEATING?: NOT HARD AT ALL

## 2024-06-19 SDOH — ECONOMIC STABILITY: FOOD INSECURITY: WITHIN THE PAST 12 MONTHS, YOU WORRIED THAT YOUR FOOD WOULD RUN OUT BEFORE YOU GOT MONEY TO BUY MORE.: NEVER TRUE

## 2024-06-19 ASSESSMENT — PATIENT HEALTH QUESTIONNAIRE - PHQ9
SUM OF ALL RESPONSES TO PHQ QUESTIONS 1-9: 0
1. LITTLE INTEREST OR PLEASURE IN DOING THINGS: NOT AT ALL
2. FEELING DOWN, DEPRESSED OR HOPELESS: NOT AT ALL
SUM OF ALL RESPONSES TO PHQ9 QUESTIONS 1 & 2: 0
SUM OF ALL RESPONSES TO PHQ QUESTIONS 1-9: 0
SUM OF ALL RESPONSES TO PHQ QUESTIONS 1-9: 0
2. FEELING DOWN, DEPRESSED OR HOPELESS: NOT AT ALL
SUM OF ALL RESPONSES TO PHQ9 QUESTIONS 1 & 2: 0
1. LITTLE INTEREST OR PLEASURE IN DOING THINGS: NOT AT ALL
SUM OF ALL RESPONSES TO PHQ QUESTIONS 1-9: 0

## 2024-06-19 NOTE — PROGRESS NOTES
Hyperlipidemia, unspecified hyperlipidemia type        Plan:    - Routine labs. We discussed weight loss medication options. Limitations currently with GLP-1. She will check with insurance company. Alternative options being Contrave. Will determine appropriate plan of care pending lab results. She will continue working on diet and exercise.     New Prescriptions    No medications on file        Orders Placed This Encounter   Procedures    CBC with Auto Differential    Comprehensive Metabolic Panel    Lipid, Fasting    TSH    Hemoglobin A1C        Return if symptoms worsen or fail to improve.         Araceli Harris, DO

## 2024-06-20 PROBLEM — E66.9 CLASS 1 OBESITY WITH BODY MASS INDEX (BMI) OF 31.0 TO 31.9 IN ADULT: Status: RESOLVED | Noted: 2019-05-21 | Resolved: 2024-06-20

## 2024-06-20 PROBLEM — E66.811 CLASS 1 OBESITY WITH BODY MASS INDEX (BMI) OF 31.0 TO 31.9 IN ADULT: Status: RESOLVED | Noted: 2019-05-21 | Resolved: 2024-06-20

## 2024-06-20 ASSESSMENT — ENCOUNTER SYMPTOMS
COUGH: 0
SHORTNESS OF BREATH: 0

## 2024-06-30 DIAGNOSIS — M75.01 ADHESIVE CAPSULITIS OF RIGHT SHOULDER: ICD-10-CM

## 2024-07-02 RX ORDER — NAPROXEN 500 MG/1
TABLET ORAL
Qty: 60 TABLET | Refills: 1 | Status: SHIPPED | OUTPATIENT
Start: 2024-07-02

## 2024-08-29 DIAGNOSIS — M75.01 ADHESIVE CAPSULITIS OF RIGHT SHOULDER: ICD-10-CM

## 2024-08-29 RX ORDER — NAPROXEN 500 MG/1
TABLET ORAL
Qty: 60 TABLET | Refills: 1 | Status: SHIPPED | OUTPATIENT
Start: 2024-08-29

## 2024-08-29 RX ORDER — ASPIRIN 81 MG/1
TABLET, COATED ORAL
Qty: 90 TABLET | Refills: 1 | OUTPATIENT
Start: 2024-08-29

## 2024-08-29 NOTE — TELEPHONE ENCOUNTER
Medication:   Requested Prescriptions     Pending Prescriptions Disp Refills    naproxen (NAPROSYN) 500 MG tablet [Pharmacy Med Name: NAPROXEN 500MG TABLETS] 60 tablet 1     Sig: TAKE 1 TABLET BY MOUTH TWICE DAILY WITH BREAKFAST AND EVENING MEAL.     Last Filled:  07/02/2024    Last appt: 6/19/2024   Next appt: Visit date not found    Last OARRS:        No data to display

## 2024-10-25 DIAGNOSIS — M75.01 ADHESIVE CAPSULITIS OF RIGHT SHOULDER: ICD-10-CM

## 2024-10-25 RX ORDER — NAPROXEN 500 MG/1
TABLET ORAL
Qty: 60 TABLET | Refills: 1 | Status: SHIPPED | OUTPATIENT
Start: 2024-10-25

## 2024-10-25 NOTE — TELEPHONE ENCOUNTER
Medication:   Requested Prescriptions     Pending Prescriptions Disp Refills    naproxen (NAPROSYN) 500 MG tablet [Pharmacy Med Name: NAPROXEN 500MG TABLETS] 60 tablet 1     Sig: TAKE 1 TABLET BY MOUTH TWICE DAILY WITH BREAKFAST AND EVENING MEAL.     Last Filled:  9/25/24    Last appt: 6/19/2024   Next appt: Visit date not found

## 2024-10-31 RX ORDER — ATORVASTATIN CALCIUM 20 MG/1
20 TABLET, FILM COATED ORAL DAILY
Qty: 90 TABLET | Refills: 3 | Status: SHIPPED | OUTPATIENT
Start: 2024-10-31

## 2024-10-31 NOTE — TELEPHONE ENCOUNTER
Medication:   Requested Prescriptions     Pending Prescriptions Disp Refills    atorvastatin (LIPITOR) 20 MG tablet 90 tablet 3     Sig: Take 1 tablet by mouth daily     Last Filled:  10/13/2023   Last appt: 6/19/2024   Next appt: Visit date not found

## 2024-12-27 DIAGNOSIS — M75.01 ADHESIVE CAPSULITIS OF RIGHT SHOULDER: ICD-10-CM

## 2024-12-27 RX ORDER — NAPROXEN 500 MG/1
TABLET ORAL
Qty: 60 TABLET | Refills: 0 | Status: SHIPPED | OUTPATIENT
Start: 2024-12-27

## 2025-01-21 DIAGNOSIS — M75.01 ADHESIVE CAPSULITIS OF RIGHT SHOULDER: ICD-10-CM

## 2025-01-21 RX ORDER — NAPROXEN 500 MG/1
TABLET ORAL
Qty: 60 TABLET | Refills: 0 | Status: SHIPPED | OUTPATIENT
Start: 2025-01-21

## 2025-01-21 NOTE — TELEPHONE ENCOUNTER
Medication:   Requested Prescriptions     Pending Prescriptions Disp Refills    naproxen (NAPROSYN) 500 MG tablet [Pharmacy Med Name: NAPROXEN 500MG TABLETS] 60 tablet 0     Sig: TAKE 1 TABLET BY MOUTH TWICE DAILY WITH BREAKFAST AND EVENING MEAL     Last Filled:  12/27/24    Last appt: 6/19/2024   Next appt: Visit date not found

## 2025-02-23 DIAGNOSIS — M75.01 ADHESIVE CAPSULITIS OF RIGHT SHOULDER: ICD-10-CM

## 2025-02-24 RX ORDER — NAPROXEN 500 MG/1
TABLET ORAL
Qty: 60 TABLET | Refills: 0 | Status: SHIPPED | OUTPATIENT
Start: 2025-02-24

## 2025-02-25 NOTE — TELEPHONE ENCOUNTER
Called patient and reminder her to complete labs. She was also scheduled because she hasn't been seen for a physical since 2023.

## 2025-02-26 DIAGNOSIS — E78.5 HYPERLIPIDEMIA, UNSPECIFIED HYPERLIPIDEMIA TYPE: ICD-10-CM

## 2025-02-26 DIAGNOSIS — E05.00 GRAVES' DISEASE: ICD-10-CM

## 2025-02-26 DIAGNOSIS — E89.0 POSTOPERATIVE HYPOTHYROIDISM: ICD-10-CM

## 2025-02-26 LAB
ALBUMIN SERPL-MCNC: 4.2 G/DL (ref 3.4–5)
ALBUMIN/GLOB SERPL: 1.8 {RATIO} (ref 1.1–2.2)
ALP SERPL-CCNC: 87 U/L (ref 40–129)
ALT SERPL-CCNC: 18 U/L (ref 10–40)
ANION GAP SERPL CALCULATED.3IONS-SCNC: 8 MMOL/L (ref 3–16)
AST SERPL-CCNC: 18 U/L (ref 15–37)
BASOPHILS # BLD: 0 K/UL (ref 0–0.2)
BASOPHILS NFR BLD: 0.7 %
BILIRUB SERPL-MCNC: 0.3 MG/DL (ref 0–1)
BUN SERPL-MCNC: 24 MG/DL (ref 7–20)
CALCIUM SERPL-MCNC: 9.5 MG/DL (ref 8.3–10.6)
CHLORIDE SERPL-SCNC: 106 MMOL/L (ref 99–110)
CHOLEST SERPL-MCNC: 205 MG/DL (ref 0–199)
CO2 SERPL-SCNC: 26 MMOL/L (ref 21–32)
CREAT SERPL-MCNC: 0.8 MG/DL (ref 0.6–1.2)
DEPRECATED RDW RBC AUTO: 13.9 % (ref 12.4–15.4)
EOSINOPHIL # BLD: 0.2 K/UL (ref 0–0.6)
EOSINOPHIL NFR BLD: 3.3 %
EST. AVERAGE GLUCOSE BLD GHB EST-MCNC: 96.8 MG/DL
GFR SERPLBLD CREATININE-BSD FMLA CKD-EPI: 84 ML/MIN/{1.73_M2}
GLUCOSE SERPL-MCNC: 87 MG/DL (ref 70–99)
HBA1C MFR BLD: 5 %
HCT VFR BLD AUTO: 44.4 % (ref 36–48)
HDLC SERPL-MCNC: 56 MG/DL (ref 40–60)
HGB BLD-MCNC: 14.6 G/DL (ref 12–16)
LDL CHOLESTEROL: 132 MG/DL
LYMPHOCYTES # BLD: 1.4 K/UL (ref 1–5.1)
LYMPHOCYTES NFR BLD: 27.8 %
MCH RBC QN AUTO: 30.9 PG (ref 26–34)
MCHC RBC AUTO-ENTMCNC: 32.9 G/DL (ref 31–36)
MCV RBC AUTO: 93.9 FL (ref 80–100)
MONOCYTES # BLD: 0.4 K/UL (ref 0–1.3)
MONOCYTES NFR BLD: 8.8 %
NEUTROPHILS # BLD: 3 K/UL (ref 1.7–7.7)
NEUTROPHILS NFR BLD: 59.4 %
PLATELET # BLD AUTO: 281 K/UL (ref 135–450)
PMV BLD AUTO: 9.8 FL (ref 5–10.5)
POTASSIUM SERPL-SCNC: 4.7 MMOL/L (ref 3.5–5.1)
PROT SERPL-MCNC: 6.5 G/DL (ref 6.4–8.2)
RBC # BLD AUTO: 4.72 M/UL (ref 4–5.2)
SODIUM SERPL-SCNC: 140 MMOL/L (ref 136–145)
TRIGL SERPL-MCNC: 83 MG/DL (ref 0–150)
TSH SERPL DL<=0.005 MIU/L-ACNC: 0.02 UIU/ML (ref 0.27–4.2)
VLDLC SERPL CALC-MCNC: 17 MG/DL
WBC # BLD AUTO: 5 K/UL (ref 4–11)

## 2025-03-04 ENCOUNTER — OFFICE VISIT (OUTPATIENT)
Dept: PRIMARY CARE CLINIC | Age: 60
End: 2025-03-04
Payer: COMMERCIAL

## 2025-03-04 VITALS
BODY MASS INDEX: 28.04 KG/M2 | WEIGHT: 179 LBS | DIASTOLIC BLOOD PRESSURE: 71 MMHG | OXYGEN SATURATION: 98 % | HEART RATE: 68 BPM | TEMPERATURE: 98.1 F | SYSTOLIC BLOOD PRESSURE: 112 MMHG

## 2025-03-04 DIAGNOSIS — Z12.31 ENCOUNTER FOR SCREENING MAMMOGRAM FOR MALIGNANT NEOPLASM OF BREAST: ICD-10-CM

## 2025-03-04 DIAGNOSIS — E89.0 POSTOPERATIVE HYPOTHYROIDISM: ICD-10-CM

## 2025-03-04 DIAGNOSIS — Z00.00 ROUTINE PHYSICAL EXAMINATION: Primary | ICD-10-CM

## 2025-03-04 PROCEDURE — 90471 IMMUNIZATION ADMIN: CPT | Performed by: FAMILY MEDICINE

## 2025-03-04 PROCEDURE — 99396 PREV VISIT EST AGE 40-64: CPT | Performed by: FAMILY MEDICINE

## 2025-03-04 PROCEDURE — 90715 TDAP VACCINE 7 YRS/> IM: CPT | Performed by: FAMILY MEDICINE

## 2025-03-04 RX ORDER — LEVOTHYROXINE SODIUM 137 UG/1
137 TABLET ORAL DAILY
Qty: 90 TABLET | Refills: 3 | Status: SHIPPED | OUTPATIENT
Start: 2025-03-04

## 2025-03-04 SDOH — ECONOMIC STABILITY: FOOD INSECURITY: WITHIN THE PAST 12 MONTHS, YOU WORRIED THAT YOUR FOOD WOULD RUN OUT BEFORE YOU GOT MONEY TO BUY MORE.: NEVER TRUE

## 2025-03-04 SDOH — ECONOMIC STABILITY: FOOD INSECURITY: WITHIN THE PAST 12 MONTHS, THE FOOD YOU BOUGHT JUST DIDN'T LAST AND YOU DIDN'T HAVE MONEY TO GET MORE.: NEVER TRUE

## 2025-03-04 ASSESSMENT — ENCOUNTER SYMPTOMS
NAUSEA: 0
DIARRHEA: 0
VOMITING: 0
SHORTNESS OF BREATH: 0
ABDOMINAL PAIN: 0

## 2025-03-04 ASSESSMENT — PATIENT HEALTH QUESTIONNAIRE - PHQ9
1. LITTLE INTEREST OR PLEASURE IN DOING THINGS: NOT AT ALL
SUM OF ALL RESPONSES TO PHQ QUESTIONS 1-9: 0
2. FEELING DOWN, DEPRESSED OR HOPELESS: NOT AT ALL
SUM OF ALL RESPONSES TO PHQ QUESTIONS 1-9: 0

## 2025-03-25 DIAGNOSIS — M75.01 ADHESIVE CAPSULITIS OF RIGHT SHOULDER: ICD-10-CM

## 2025-03-25 RX ORDER — NAPROXEN 500 MG/1
TABLET ORAL
Qty: 60 TABLET | Refills: 0 | Status: SHIPPED | OUTPATIENT
Start: 2025-03-25

## 2025-03-27 ENCOUNTER — HOSPITAL ENCOUNTER (OUTPATIENT)
Dept: MAMMOGRAPHY | Age: 60
Discharge: HOME | End: 2025-03-27
Attending: FAMILY MEDICINE
Payer: COMMERCIAL

## 2025-03-27 VITALS — HEIGHT: 67 IN | WEIGHT: 179 LBS | BODY MASS INDEX: 28.09 KG/M2

## 2025-03-27 DIAGNOSIS — Z12.31 ENCOUNTER FOR SCREENING MAMMOGRAM FOR MALIGNANT NEOPLASM OF BREAST: ICD-10-CM

## 2025-03-27 PROCEDURE — 77063 BREAST TOMOSYNTHESIS BI: CPT

## 2025-04-23 ENCOUNTER — OFFICE VISIT (OUTPATIENT)
Dept: PRIMARY CARE CLINIC | Age: 60
End: 2025-04-23

## 2025-04-23 VITALS
OXYGEN SATURATION: 96 % | TEMPERATURE: 98.1 F | SYSTOLIC BLOOD PRESSURE: 111 MMHG | BODY MASS INDEX: 28.16 KG/M2 | DIASTOLIC BLOOD PRESSURE: 70 MMHG | WEIGHT: 179.8 LBS | HEART RATE: 66 BPM

## 2025-04-23 DIAGNOSIS — S76.811A STRAIN OF RIGHT ILIOPSOAS MUSCLE, INITIAL ENCOUNTER: ICD-10-CM

## 2025-04-23 DIAGNOSIS — R35.0 FREQUENT URINATION: Primary | ICD-10-CM

## 2025-04-23 LAB
BILIRUBIN, POC: NEGATIVE
BLOOD URINE, POC: NEGATIVE
CLARITY, POC: CLEAR
COLOR, POC: YELLOW
GLUCOSE URINE, POC: NEGATIVE MG/DL
KETONES, POC: NEGATIVE MG/DL
LEUKOCYTE EST, POC: NORMAL
NITRITE, POC: NORMAL
PH, POC: 6.5
PROTEIN, POC: NEGATIVE MG/DL
SPECIFIC GRAVITY, POC: 1.03
UROBILINOGEN, POC: 0.2 MG/DL

## 2025-04-23 RX ORDER — CYCLOBENZAPRINE HCL 5 MG
5 TABLET ORAL NIGHTLY PRN
Qty: 14 TABLET | Refills: 0 | Status: SHIPPED | OUTPATIENT
Start: 2025-04-23 | End: 2025-05-07

## 2025-04-23 ASSESSMENT — ENCOUNTER SYMPTOMS: BACK PAIN: 1

## 2025-04-23 NOTE — PROGRESS NOTES
[facility]  [facility]ARE  [address] [place], [place] [id number]  Dept: [phone]  Dept Fax: [phone]     2025      Mylene J Eusebia   1965     Chief Complaint   Patient presents with    Back Pain     Patient c/o lower right back pain.        HPI    Pt comes in today for right lower back pain. Started over the last week. Has been working in her yard everyday. No specific injury or trauma. Some urinary frequency, but no dysuria or hematuria. Has some associated tightness in her hamstring area.     No data recorded     Prior to Visit Medications    Medication Sig Taking? Authorizing Provider   cyclobenzaprine (FLEXERIL) 5 MG tablet Take 1 tablet by mouth nightly as needed for Muscle spasms Yes Araceli Harris DO   naproxen (NAPROSYN) 500 MG tablet TAKE 1 TABLET BY MOUTH TWICE DAILY WITH BREAKFAST AND EVENING MEAL  Araceli Harris DO   levothyroxine (SYNTHROID) 137 MCG tablet Take 1 tablet by mouth Daily  Araceli Harris,         Past Medical History:   Diagnosis Date    Graves disease     S/p thyroidectomy, follows with Dr. Juliana Calhoun Endo    Hyperlipidemia     Thyroid eye disease         Social History     Tobacco Use    Smoking status: Former     Current packs/day: 0.00     Average packs/day: 0.3 packs/day for 35.0 years (8.8 ttl pk-yrs)     Types: Cigarettes     Start date:      Quit date: 2024     Years since quittin.3    Smokeless tobacco: Never    Tobacco comments:     3 cig a day   Vaping Use    Vaping status: Former   Substance Use Topics    Alcohol use: Yes     Comment: socially    Drug use: No        Past Surgical History:   Procedure Laterality Date    CHOLECYSTECTOMY      COLONOSCOPY  2021    COLONOSCOPY POLYPECTOMY SNARE/COLD BIOPSY performed by Chaitanya Lee MD at [facility] ENDOSCOPY    HYSTERECTOMY, VAGINAL      THYROIDECTOMY Bilateral 2020    TOTAL THYROIDECTOMY performed by Oswaldo ECRDA

## 2025-04-26 DIAGNOSIS — M75.01 ADHESIVE CAPSULITIS OF RIGHT SHOULDER: ICD-10-CM

## 2025-04-28 RX ORDER — NAPROXEN 500 MG/1
TABLET ORAL
Qty: 60 TABLET | Refills: 0 | Status: SHIPPED | OUTPATIENT
Start: 2025-04-28

## 2025-06-27 ENCOUNTER — HOSPITAL ENCOUNTER (EMERGENCY)
Age: 60
Discharge: HOME | End: 2025-06-27
Attending: EMERGENCY MEDICINE
Payer: COMMERCIAL

## 2025-06-27 ENCOUNTER — APPOINTMENT (OUTPATIENT)
Dept: CT IMAGING | Age: 60
End: 2025-06-27
Payer: COMMERCIAL

## 2025-06-27 VITALS
TEMPERATURE: 98.3 F | BODY MASS INDEX: 26.78 KG/M2 | DIASTOLIC BLOOD PRESSURE: 58 MMHG | WEIGHT: 170.6 LBS | OXYGEN SATURATION: 97 % | RESPIRATION RATE: 21 BRPM | HEIGHT: 67 IN | SYSTOLIC BLOOD PRESSURE: 108 MMHG | HEART RATE: 71 BPM

## 2025-06-27 DIAGNOSIS — R10.31 RIGHT LOWER QUADRANT ABDOMINAL PAIN: Primary | ICD-10-CM

## 2025-06-27 LAB
ALBUMIN SERPL-MCNC: 3.7 G/DL (ref 3.4–5)
ALP SERPL-CCNC: 70 U/L (ref 40–129)
ALT SERPL-CCNC: 21 U/L (ref 10–40)
ANION GAP SERPL CALCULATED.3IONS-SCNC: 8 MMOL/L (ref 3–16)
AST SERPL-CCNC: 17 U/L (ref 15–37)
BASOPHILS # BLD: 0.1 K/UL (ref 0–0.2)
BASOPHILS NFR BLD: 0.8 %
BILIRUB DIRECT SERPL-MCNC: 0.2 MG/DL (ref 0–0.3)
BILIRUB INDIRECT SERPL-MCNC: 0.2 MG/DL (ref 0–1)
BILIRUB SERPL-MCNC: 0.4 MG/DL (ref 0–1)
BILIRUB UR QL STRIP.AUTO: NEGATIVE
BUN SERPL-MCNC: 19 MG/DL (ref 7–20)
CALCIUM SERPL-MCNC: 8.5 MG/DL (ref 8.3–10.6)
CHLORIDE SERPL-SCNC: 107 MMOL/L (ref 99–110)
CLARITY UR: CLEAR
CO2 SERPL-SCNC: 25 MMOL/L (ref 21–32)
COLOR UR: YELLOW
CREAT SERPL-MCNC: 0.7 MG/DL (ref 0.6–1.2)
DEPRECATED RDW RBC AUTO: 13.2 % (ref 12.4–15.4)
EOSINOPHIL # BLD: 0 K/UL (ref 0–0.6)
EOSINOPHIL NFR BLD: 0.5 %
GFR SERPLBLD CREATININE-BSD FMLA CKD-EPI: >90 ML/MIN/{1.73_M2}
GLUCOSE SERPL-MCNC: 91 MG/DL (ref 70–99)
GLUCOSE UR STRIP.AUTO-MCNC: NEGATIVE MG/DL
HCT VFR BLD AUTO: 43.3 % (ref 36–48)
HGB BLD-MCNC: 14.4 G/DL (ref 12–16)
HGB UR QL STRIP.AUTO: NEGATIVE
KETONES UR STRIP.AUTO-MCNC: NEGATIVE MG/DL
LEUKOCYTE ESTERASE UR QL STRIP.AUTO: NEGATIVE
LIPASE SERPL-CCNC: 13 U/L (ref 13–60)
LYMPHOCYTES # BLD: 2 K/UL (ref 1–5.1)
LYMPHOCYTES NFR BLD: 22.3 %
MCH RBC QN AUTO: 30.4 PG (ref 26–34)
MCHC RBC AUTO-ENTMCNC: 33.3 G/DL (ref 31–36)
MCV RBC AUTO: 91.4 FL (ref 80–100)
MONOCYTES # BLD: 0.7 K/UL (ref 0–1.3)
MONOCYTES NFR BLD: 8.2 %
NEUTROPHILS # BLD: 6 K/UL (ref 1.7–7.7)
NEUTROPHILS NFR BLD: 68.2 %
NITRITE UR QL STRIP.AUTO: NEGATIVE
PH UR STRIP.AUTO: 7 [PH] (ref 5–8)
PLATELET # BLD AUTO: 256 K/UL (ref 135–450)
PMV BLD AUTO: 9 FL (ref 5–10.5)
POTASSIUM SERPL-SCNC: 3.5 MMOL/L (ref 3.5–5.1)
PROT SERPL-MCNC: 6 G/DL (ref 6.4–8.2)
PROT UR STRIP.AUTO-MCNC: NEGATIVE MG/DL
RBC # BLD AUTO: 4.74 M/UL (ref 4–5.2)
RBC #/AREA URNS HPF: NORMAL /HPF (ref 0–4)
SODIUM SERPL-SCNC: 140 MMOL/L (ref 136–145)
SP GR UR STRIP.AUTO: 1.01 (ref 1–1.03)
UA DIPSTICK W REFLEX MICRO PNL UR: NORMAL
URN SPEC COLLECT METH UR: NORMAL
UROBILINOGEN UR STRIP-ACNC: 0.2 E.U./DL
WBC # BLD AUTO: 8.8 K/UL (ref 4–11)
WBC #/AREA URNS HPF: NORMAL /HPF (ref 0–5)

## 2025-06-27 PROCEDURE — 80076 HEPATIC FUNCTION PANEL: CPT

## 2025-06-27 PROCEDURE — 83690 ASSAY OF LIPASE: CPT

## 2025-06-27 PROCEDURE — 80048 BASIC METABOLIC PNL TOTAL CA: CPT

## 2025-06-27 PROCEDURE — 85025 COMPLETE CBC W/AUTO DIFF WBC: CPT

## 2025-06-27 PROCEDURE — 6360000002 HC RX W HCPCS: Performed by: EMERGENCY MEDICINE

## 2025-06-27 PROCEDURE — 99285 EMERGENCY DEPT VISIT HI MDM: CPT

## 2025-06-27 PROCEDURE — 81001 URINALYSIS AUTO W/SCOPE: CPT

## 2025-06-27 PROCEDURE — 6360000004 HC RX CONTRAST MEDICATION: Performed by: EMERGENCY MEDICINE

## 2025-06-27 PROCEDURE — 96372 THER/PROPH/DIAG INJ SC/IM: CPT

## 2025-06-27 PROCEDURE — 74177 CT ABD & PELVIS W/CONTRAST: CPT

## 2025-06-27 PROCEDURE — 96374 THER/PROPH/DIAG INJ IV PUSH: CPT

## 2025-06-27 RX ORDER — ONDANSETRON 2 MG/ML
4 INJECTION INTRAMUSCULAR; INTRAVENOUS ONCE
Status: COMPLETED | OUTPATIENT
Start: 2025-06-27 | End: 2025-06-27

## 2025-06-27 RX ORDER — IOPAMIDOL 755 MG/ML
75 INJECTION, SOLUTION INTRAVASCULAR
Status: COMPLETED | OUTPATIENT
Start: 2025-06-27 | End: 2025-06-27

## 2025-06-27 RX ORDER — DICYCLOMINE HYDROCHLORIDE 10 MG/ML
10 INJECTION INTRAMUSCULAR ONCE
Status: COMPLETED | OUTPATIENT
Start: 2025-06-27 | End: 2025-06-27

## 2025-06-27 RX ORDER — DICYCLOMINE HYDROCHLORIDE 10 MG/1
10 CAPSULE ORAL 3 TIMES DAILY PRN
Qty: 30 CAPSULE | Refills: 0 | Status: SHIPPED | OUTPATIENT
Start: 2025-06-27

## 2025-06-27 RX ADMIN — DICYCLOMINE HYDROCHLORIDE 10 MG: 10 INJECTION, SOLUTION INTRAMUSCULAR at 17:07

## 2025-06-27 RX ADMIN — IOPAMIDOL 75 ML: 755 INJECTION, SOLUTION INTRAVENOUS at 16:23

## 2025-06-27 RX ADMIN — ONDANSETRON 4 MG: 2 INJECTION, SOLUTION INTRAMUSCULAR; INTRAVENOUS at 15:25

## 2025-06-27 ASSESSMENT — PAIN DESCRIPTION - LOCATION: LOCATION: ABDOMEN

## 2025-06-27 ASSESSMENT — PAIN DESCRIPTION - ORIENTATION: ORIENTATION: RIGHT;LOWER

## 2025-06-27 ASSESSMENT — LIFESTYLE VARIABLES
HOW OFTEN DO YOU HAVE A DRINK CONTAINING ALCOHOL: MONTHLY OR LESS
HOW MANY STANDARD DRINKS CONTAINING ALCOHOL DO YOU HAVE ON A TYPICAL DAY: 1 OR 2

## 2025-06-27 ASSESSMENT — PAIN SCALES - GENERAL
PAINLEVEL_OUTOF10: 0
PAINLEVEL_OUTOF10: 3
PAINLEVEL_OUTOF10: 3

## 2025-06-27 ASSESSMENT — PAIN DESCRIPTION - DESCRIPTORS: DESCRIPTORS: SHARP

## 2025-06-27 ASSESSMENT — PAIN - FUNCTIONAL ASSESSMENT
PAIN_FUNCTIONAL_ASSESSMENT: 0-10
PAIN_FUNCTIONAL_ASSESSMENT: 0-10

## 2025-06-27 NOTE — ED PROVIDER NOTES
THE [facility]  EMERGENCY DEPARTMENT ENCOUNTER          ATTENDING PHYSICIAN NOTE       Date of evaluation: 6/27/2025    Chief Complaint     Abdominal Pain, Vomiting, and Diarrhea (Right abdominal pain started last Sunday with vomiting and diarrhea felt better on Wednesday but started again last night)      History of Present Illness     Mylene Laws is a 59 y.o. female who presents to the emergency department with a complaint of right lower quadrant abdominal pain.  The patient reports that last weekend she began having nausea vomiting and diarrhea which had improved by Wednesday but then after that 2 days ago began having some abdominal pain primarily on the right side which is sharp cramping intermittent in its nature.  She states the pain is localized over into her right lower abdomen and around her bellybutton.  She has had a prior cholecystectomy denies any other prior abdominal surgeries.  Denies any dysuria urinary frequency or hematuria.  She has some mild nausea but has not had any vomiting since last weekend.    ASSESSMENT / PLAN  (MEDICAL DECISION MAKING)     INITIAL VITALS: BP: 134/82, Temp: 98.3 °F (36.8 °C), Pulse: 74, Respirations: 16, SpO2: 98 %      Mylene Laws is a 59 y.o. female who presented to the Emergency Department with concerns for right lower quadrant abdominal pain over the course of the past 48 hours in the setting of a preceding what sounds like a gastroenteritis.  On examination she had fairly significant tenderness to palpation in the right lower quadrant without any peritoneal signs.  Laboratory studies were sent and she ultimately had a CT scan of her abdomen and pelvis.  Initially was not having significant pain at the time of my examination and did not want anything for pain control but did want nausea medicines.  Was given Zofran for symptom control.  The patient's lab studies and CT have resulted back showing a CBC with no evidence of leukocytosis or left shift

## 2025-06-27 NOTE — ED NOTES
Patient prepared for and ready to be discharged. Dressed in clothes and given belongings.  IV removed, pt tolerated well, no complications.  Patient discharged at this time in no acute distress after pt verbalized understanding of discharge instructions.   Reviewed medications, and when to return to the ED with patient. Encouraged follow up with PCP  Patient walked to lobby, Family to take patient home.      Escuadra, Marylee, RN  06/27/25 1800

## 2025-06-27 NOTE — DISCHARGE INSTRUCTIONS
You were seen in the emergency department for pain in the right side of your abdomen.  You had laboratory studies which were all normal and a CAT scan of your abdomen and pelvis which was normal.  You should keep a close eye on your symptoms and if you have any worsening or change of your symptoms, develop any fever, develop uncontrolled nausea vomiting or diarrhea, should return to the emergency department for further evaluation.  Otherwise follow-up with your primary care provider and you may take the prescribed Bentyl as needed for pain.

## 2025-06-27 NOTE — ED TRIAGE NOTES
Right abdominal pain started last Sunday with vomiting and diarrhea felt better on Wednesday but started again last night

## 2025-09-02 ENCOUNTER — TELEMEDICINE (OUTPATIENT)
Age: 60
End: 2025-09-02
Payer: COMMERCIAL

## 2025-09-02 DIAGNOSIS — H00.015 HORDEOLUM EXTERNUM OF LEFT LOWER EYELID: Primary | ICD-10-CM

## 2025-09-02 PROCEDURE — 99213 OFFICE O/P EST LOW 20 MIN: CPT | Performed by: NURSE PRACTITIONER

## 2025-09-02 RX ORDER — ERYTHROMYCIN 5 MG/G
OINTMENT OPHTHALMIC
Qty: 3.5 G | Refills: 0 | Status: SHIPPED | OUTPATIENT
Start: 2025-09-02 | End: 2025-09-12

## 2025-09-02 ASSESSMENT — ENCOUNTER SYMPTOMS: EYE PAIN: 1

## (undated) DEVICE — SURE SET-DOUBLE BASIN-LF: Brand: MEDLINE INDUSTRIES, INC.

## (undated) DEVICE — ELECTROSURGICAL PENCIL ROCKER SWITCH NON COATED BLADE ELECTRODE 10 FT (3 M) CORD HOLSTER: Brand: MEGADYNE

## (undated) DEVICE — SHEARS ENDOSCP L9CM CRV HARM FOCS +

## (undated) DEVICE — COVER LT HNDL BLU PLAS

## (undated) DEVICE — SPONGE,PEANUT,XRAY,ST,SM,3/8",5/CARD: Brand: MEDLINE INDUSTRIES, INC.

## (undated) DEVICE — DRAPE,INSTRUMENT,MAGNETIC,10X16: Brand: MEDLINE

## (undated) DEVICE — PROBE 8225101 5PK STD PRASS FL TIP ROHS

## (undated) DEVICE — FORCEPS BX L240CM JAW DIA2.4MM ORNG L CAP W/ NDL DISP RAD

## (undated) DEVICE — BLANKET WRM W40.2XL55.9IN IORT LO BODY + MISTRAL AIR

## (undated) DEVICE — SUTURE PERMAHAND SZ 3-0 L18IN NONABSORBABLE BLK SILK BRAID A184H

## (undated) DEVICE — SURGICAL SET UP - SURE SET: Brand: MEDLINE INDUSTRIES, INC.

## (undated) DEVICE — GARMENT,MEDLINE,DVT,INT,CALF,MED, GEN2: Brand: MEDLINE

## (undated) DEVICE — AGENT HEMSTAT W2XL3IN OXIDIZED REGENERATED CELOS ABSRB

## (undated) DEVICE — GOWN,SIRUS,POLYRNF,BRTHSLV,XLN/XL,20/CS: Brand: MEDLINE

## (undated) DEVICE — CABLE BPLR L12FT FLYING LD DISPOSABLE

## (undated) DEVICE — TRAY PREP DRY W/ PREM GLV 2 APPL 6 SPNG 2 UNDPD 1 OVERWRAP

## (undated) DEVICE — PLATE ES AD W 9FT CRD 2

## (undated) DEVICE — SUTURE CHROMIC GUT SZ 3-0 L27IN ABSRB BRN L26MM SH 1/2 CIR G122H

## (undated) DEVICE — JEWISH HOSPITAL TURNOVER KIT: Brand: MEDLINE INDUSTRIES, INC.

## (undated) DEVICE — ENDOTRACH TUBE 8229306 NIM EMG 6MM ROHS: Brand: NIM®

## (undated) DEVICE — CANNULA SAMP CO2 AD GRN 7FT CO2 AND 7FT O2 TBNG UNIV CONN

## (undated) DEVICE — E-Z CLEAN, NON-STICK, PTFE COATED, ELECTROSURGICAL BLADE ELECTRODE, MODIFIED EXTENDED INSULATION, 2.5 INCH (6.35 CM): Brand: MEGADYNE

## (undated) DEVICE — GAUZE,SPONGE,4"X4",16PLY,XRAY,STRL,LF: Brand: MEDLINE

## (undated) DEVICE — GLOVE ORANGE PI 7 1/2   MSG9075

## (undated) DEVICE — PACK,EENT,TURBAN DRAPE,PK II: Brand: MEDLINE

## (undated) DEVICE — STANDARD HYPODERMIC NEEDLE,POLYPROPYLENE HUB: Brand: MONOJECT

## (undated) DEVICE — SUTURE CHROMIC GUT SZ 4-0 L18IN ABSRB BRN L13MM P-3 3/8 CIR 1654G